# Patient Record
Sex: MALE | Race: WHITE
[De-identification: names, ages, dates, MRNs, and addresses within clinical notes are randomized per-mention and may not be internally consistent; named-entity substitution may affect disease eponyms.]

---

## 2023-10-11 ENCOUNTER — HOSPITAL ENCOUNTER (INPATIENT)
Dept: HOSPITAL 97 - ER | Age: 63
LOS: 2 days | Discharge: HOME | DRG: 854 | End: 2023-10-13
Attending: STUDENT IN AN ORGANIZED HEALTH CARE EDUCATION/TRAINING PROGRAM | Admitting: STUDENT IN AN ORGANIZED HEALTH CARE EDUCATION/TRAINING PROGRAM
Payer: COMMERCIAL

## 2023-10-11 VITALS — BODY MASS INDEX: 40.8 KG/M2

## 2023-10-11 DIAGNOSIS — L02.215: ICD-10-CM

## 2023-10-11 DIAGNOSIS — I15.2: ICD-10-CM

## 2023-10-11 DIAGNOSIS — R65.20: ICD-10-CM

## 2023-10-11 DIAGNOSIS — Z96.652: ICD-10-CM

## 2023-10-11 DIAGNOSIS — E66.9: ICD-10-CM

## 2023-10-11 DIAGNOSIS — E11.65: ICD-10-CM

## 2023-10-11 DIAGNOSIS — Z79.899: ICD-10-CM

## 2023-10-11 DIAGNOSIS — Z79.4: ICD-10-CM

## 2023-10-11 DIAGNOSIS — E78.00: ICD-10-CM

## 2023-10-11 DIAGNOSIS — A41.9: Primary | ICD-10-CM

## 2023-10-11 LAB
ALBUMIN SERPL BCP-MCNC: 2.9 G/DL (ref 3.4–5)
ALP SERPL-CCNC: 88 U/L (ref 45–117)
ALT SERPL W P-5'-P-CCNC: 16 U/L (ref 16–61)
AST SERPL W P-5'-P-CCNC: 5 U/L (ref 15–37)
BUN BLD-MCNC: 14 MG/DL (ref 7–18)
GLUCOSE SERPLBLD-MCNC: 431 MG/DL (ref 74–106)
HCT VFR BLD CALC: 42.5 % (ref 39.6–49)
INR BLD: 1.09
LYMPHOCYTES # SPEC AUTO: 1.2 K/UL (ref 0.7–4.9)
MCV RBC: 91.9 FL (ref 80–100)
PMV BLD: 8.3 FL (ref 7.6–11.3)
POTASSIUM SERPL-SCNC: 4 MEQ/L (ref 3.5–5.1)
RBC # BLD: 4.62 M/UL (ref 4.33–5.43)
WBC # BLD AUTO: 15.8 THOU/UL (ref 4.3–10.9)

## 2023-10-11 PROCEDURE — 0KBM0ZZ EXCISION OF PERINEUM MUSCLE, OPEN APPROACH: ICD-10-PCS

## 2023-10-11 PROCEDURE — 87077 CULTURE AEROBIC IDENTIFY: CPT

## 2023-10-11 PROCEDURE — 87070 CULTURE OTHR SPECIMN AEROBIC: CPT

## 2023-10-11 PROCEDURE — 72193 CT PELVIS W/DYE: CPT

## 2023-10-11 PROCEDURE — 96365 THER/PROPH/DIAG IV INF INIT: CPT

## 2023-10-11 PROCEDURE — 81001 URINALYSIS AUTO W/SCOPE: CPT

## 2023-10-11 PROCEDURE — 36415 COLL VENOUS BLD VENIPUNCTURE: CPT

## 2023-10-11 PROCEDURE — 82947 ASSAY GLUCOSE BLOOD QUANT: CPT

## 2023-10-11 PROCEDURE — 87205 SMEAR GRAM STAIN: CPT

## 2023-10-11 PROCEDURE — 83605 ASSAY OF LACTIC ACID: CPT

## 2023-10-11 PROCEDURE — 83735 ASSAY OF MAGNESIUM: CPT

## 2023-10-11 PROCEDURE — 85025 COMPLETE CBC W/AUTO DIFF WBC: CPT

## 2023-10-11 PROCEDURE — 87075 CULTR BACTERIA EXCEPT BLOOD: CPT

## 2023-10-11 PROCEDURE — 80048 BASIC METABOLIC PNL TOTAL CA: CPT

## 2023-10-11 PROCEDURE — 87040 BLOOD CULTURE FOR BACTERIA: CPT

## 2023-10-11 PROCEDURE — 96375 TX/PRO/DX INJ NEW DRUG ADDON: CPT

## 2023-10-11 PROCEDURE — 80053 COMPREHEN METABOLIC PANEL: CPT

## 2023-10-11 PROCEDURE — 99285 EMERGENCY DEPT VISIT HI MDM: CPT

## 2023-10-11 PROCEDURE — 3E033XZ INTRODUCTION OF VASOPRESSOR INTO PERIPHERAL VEIN, PERCUTANEOUS APPROACH: ICD-10-PCS

## 2023-10-11 PROCEDURE — 96361 HYDRATE IV INFUSION ADD-ON: CPT

## 2023-10-11 PROCEDURE — 87186 SC STD MICRODIL/AGAR DIL: CPT

## 2023-10-11 PROCEDURE — 85610 PROTHROMBIN TIME: CPT

## 2023-10-11 PROCEDURE — 84100 ASSAY OF PHOSPHORUS: CPT

## 2023-10-11 PROCEDURE — 87086 URINE CULTURE/COLONY COUNT: CPT

## 2023-10-11 PROCEDURE — 85730 THROMBOPLASTIN TIME PARTIAL: CPT

## 2023-10-11 PROCEDURE — 87088 URINE BACTERIA CULTURE: CPT

## 2023-10-11 PROCEDURE — 93005 ELECTROCARDIOGRAM TRACING: CPT

## 2023-10-11 RX ADMIN — Medication SCH ML: at 20:03

## 2023-10-11 RX ADMIN — MORPHINE SULFATE PRN MG: 4 INJECTION, SOLUTION INTRAMUSCULAR; INTRAVENOUS at 20:01

## 2023-10-11 RX ADMIN — HYDROCODONE BITARTRATE AND ACETAMINOPHEN PRN TAB: 10; 325 TABLET ORAL at 22:44

## 2023-10-11 RX ADMIN — SODIUM CHLORIDE SCH MLS: 9 INJECTION, SOLUTION INTRAVENOUS at 22:40

## 2023-10-11 RX ADMIN — SODIUM CHLORIDE SCH MLS: 0.9 INJECTION, SOLUTION INTRAVENOUS at 17:28

## 2023-10-11 RX ADMIN — HUMAN INSULIN SCH UNIT: 100 INJECTION, SOLUTION SUBCUTANEOUS at 20:18

## 2023-10-11 NOTE — P.HP
Certification for Inpatient


With expected LOS: <2 Midnights


Patient will require the following post-hospital care: None


Practitioner: I am a practitioner with admitting privileges, knowledge of 

patient current condition, hospital course, and medical plan of care.


Services: Services provided to patient in accordance with Admission requirements

found in Title 42 Section 412.3 of the Code of Federal Regulations





Patient History


Date of Service: 10/11/23


Reason for admission: Severe sepsis, perineal abscess


History of Present Illness: 








Ceasar Larios 63-year-old male with a history of hypertension, diabetes 

mellitus, hypercholesterolemia presented to the ER via ambulance with 

complaining of groin abscess.  Patient reported the symptoms started 10 days 

ago.  Patient reports pain on the rectal area got worse today.  Patient also 

reports pain when urinating.  Patient reports the pain is 10 out of 10.  Denies 

history of previous colorectal surgeries, or hemorrhoids.  Patient denies fever 

chills nausea vomiting.  Patient denies chest pain palpitation chest discomfort.

 Patient reports he has been not checking his blood sugar for few weeks as his 

glucometer is not working.  He seen his diabetic doctor once every 6 months last

A1c was around 6.


ED course; vital signs blood pressure 166/100, pulse 116, respiration 20, 

temperature 98.2, pulse ox 95% on room air.  Lab works is reports significant 

for WBC 15.80,neutrophil 85.3 lymphocytes 7.3 blood sugar 431, lactic acid 2.2, 

albumin 2.9.  Patient is in regular sinus tachycardia QRS Axis is normal, NE 

interval is normal, QRS interval is normal, with nonspecific STT changes.  

Patient was given well 1 L normal saline IV bolus, Zosyn IV piggyback to 3.375 g

IV over 60 minutes, morphine 4 mg IV push over 4 minutes for pain given.  CT 

pelvis without contrast significant for large irregular abscess collection seen 

in the right aspect of the perineum.  There is associated left-sided disc 

scrotal and perineal soft tissue thickening as well.  This appears to 

communicate nterolateraly and general laterally with skin surface but does not 

extend into intrapelvic contents.  Planning to admit the patient with the di

agnosis of severe sepsis without septic shock, and perineal abscess





Allergies





No Known Allergies Allergy (Unverified 10/11/23 14:40)


   








Physical Examination





- Studies


Laboratory Data (last 24 hrs)











  10/11/23 10/11/23 10/11/23





  12:06 12:06 12:06


 


WBC    15.80 H


 


Hgb    14.7


 


Hct    42.5


 


Plt Count    291


 


PT  12.0  


 


INR  1.09  


 


APTT  30.4  


 


Sodium   136 


 


Potassium   4.0 


 


BUN   14 


 


Creatinine   1.20 


 


Glucose   431 H* 


 


Total Bilirubin   0.4 


 


AST   5 L 


 


ALT   16 


 


Alkaline Phosphatase   88 











Assessment and Plan





- Problems (Diagnosis)


(1) Perineal abscess


Current Visit: Yes   Status: Acute   


Plan: 





Acute, patient with complaints of severe rectal area pain for last 10 days pain 

got worse today 


Surgery Dr. Yin consulted and seen


Planning to take the patient to the OR today

















(2) Hypertension


Current Visit: Yes   Status: Chronic   


Plan: 





Chronic, controlled on the current  Home medication current


Resume home medications


Continue to monitor





Qualifiers: 


   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   





(3) Hyperglycemia due to type 2 diabetes mellitus


Current Visit: Yes   Status: Chronic   


Plan: 








Chronic, controlled on insulin


Denies hypoglycemic episodes


Seeing endocrinologist every 3 to 6-month


Blood sugar ranges 100-200 at home had not checked her blood sugar for a few 

weeks due to glucometer not working


Blood sugar check every 6 hours with regular insulin aggressive management as 

the blood sugar level is above 400 today3


Hypoglycemic precautions











(4) Hyperlipidemia associated with type 2 diabetes mellitus


Current Visit: Yes   Status: Chronic   


Plan: 








Chronic, controlled on statin therapy at home


Resume home medication


Low-fat low-cholesterol diet


We will check lipid panel in the morning











(5) Severe sepsis


Current Visit: Yes   Status: Acute   


Discharge Plan: Home


Plan to discharge in: 24 Hours





- Advance Directives


Does patient have a Living Will: No


Does patient have a Durable POA for Healthcare: No





- Code Status/Comfort Care


Code Status: Full Code


Physician Review: Patient Assessed, Agree with Above Assessment and Plan


Critical Care: Yes


Time Spent Managing Pts Care (In Minutes): 55 (Minutes)

## 2023-10-11 NOTE — ER
Nurse's Notes                                                                                     

 [facility]                                                                 

Name: Constantino Mcdonough                                                                               

Age: 63 yrs                                                                                       

Sex: Male                                                                                         

: 1960                                                                                   

MRN: [id number]                                                                                   

Arrival Date: 10/11/2023                                                                          

Time: 11:43                                                                                       

Account#: [id number]                                                                            

Bed 15                                                                                            

Private MD:                                                                                       

Diagnosis: Severe sepsis without septic shock;Perineal Abscess                                    

                                                                                                  

Presentation:                                                                                     

10/11                                                                                             

11:53 Chief complaint: Patient states: Abscess to perineum area for 10 days. States there is  ll1 

      a hard knot there that drains some pus. No known fever. Coronavirus screen: Vaccine         

      status: Patient reports receiving the 2nd dose of the covid vaccine. Client denies          

      travel out of the [place] in the last 14 days. At this time, the client does not indicate      

      any symptoms associated with coronavirus-19. Ebola Screen: Patient denies travel to an      

      Ebola-affected area in the 21 days before illness onset. Initial Sepsis Screen: Does        

      the patient meet any 2 criteria? No. Patient's initial sepsis screen is negative. Does      

      the patient have a suspected source of infection? Yes: Skin breakdown/wound. Risk           

      Assessment: Do you want to hurt yourself or someone else? Patient reports no desire to      

      harm self or others. Onset of symptoms was 2023.                                 

11:53 Method Of Arrival: Ambulatory                                                           ll1 

11:53 Acuity: LUKE 2                                                                           ll1 

                                                                                                  

Triage Assessment:                                                                                

14:17 General: Behavior is calm, cooperative.                                                 db  

                                                                                                  

Historical:                                                                                       

- Allergies:                                                                                      

11:52 No Known Allergies;                                                                     ll1 

- PMHx:                                                                                           

11:52 Hypertensive disorder; Diabetes mellitus; Hypercholesterolemia;                         ll1 

- PSHx:                                                                                           

11:52 hernia repair x 2; knee replacement;                                                    ll1 

                                                                                                  

- Immunization history:: Adult Immunizations up to date.                                          

- Social history:: Smoking status: Patient denies any tobacco usage or history of.                

                                                                                                  

                                                                                                  

Screenin:15 [facility] ED Fall Risk Assessment (Adult) History of falling in the last 3 months,       db  

      including since admission No falls in past 3 months (0 pts) Confusion or Disorientation     

      No (0 pts) Intoxicated or Sedated No (0 pts) Impaired Gait No (0 pts) Mobility Assist       

      Device Used No (0 pt) Altered Elimination No (0 pt) Score/Fall Risk Level 0 - 2 = Low       

      Risk Oriented to surroundings, Maintained a safe environment. Abuse screen: Denies          

      threats or abuse. Denies injuries from another. Nutritional screening: No deficits          

      noted. Tuberculosis screening: No symptoms or risk factors identified.                      

                                                                                                  

Assessment:                                                                                       

12:30 Reassessment: Patient appears in no apparent distress at this time. Patient and/or      db  

      family updated on plan of care and expected duration. Pain level reassessed. Patient is     

      alert, oriented x 3, equal unlabored respirations, skin warm/dry/pink. General: Appears     

      in no apparent distress. comfortable. Pain: Complains of pain in pelvis.                    

13:30 Reassessment: Patient appears in no apparent distress at this time. Patient and/or      db  

      family updated on plan of care and expected duration. Pain level reassessed. Patient is     

      alert, oriented x 3, equal unlabored respirations, skin warm/dry/pink.                      

14:14 Reassessment: Patient appears in no apparent distress at this time. Patient and/or      db  

      family updated on plan of care and expected duration. Pain level reassessed. Patient is     

      alert, oriented x 3, equal unlabored respirations, skin warm/dry/pink. PT TRANSPORTED       

      TO OR VIA STRETCHER WITH OR RN. PT IN NAD. REPORT GIVEN TO OR RN.                           

                                                                                                  

Vital Signs:                                                                                      

11:53  / 100; Pulse 116; Resp 20; Temp 98.2; Pulse Ox 95% ; Weight 129.27 kg; Height 5  ll1 

      ft. 10 in. ; Pain 10/10;                                                                    

12:24  / 92; Pulse 115; Resp 19 S; Temp 98.2; Pulse Ox 93% on R/A; Pain 10/10;          iw  

13:00  / 78; Pulse 102; Resp 16; Pulse Ox 95% ;                                         db  

14:00  / 93; Pulse 101; Resp 16; Pulse Ox 95% on R/A;                                   db  

11:53 Body Mass Index 40.89 (129.27 kg, 177.8 cm)                                             ll1 

11:53 Pain Scale: Adult                                                                       ll1 

12:24 Pain Scale: Adult                                                                       iw  

                                                                                                  

ED Course:                                                                                        

11:44 Patient arrived in ED.                                                                  rg4 

11:45 Ignacio Liang DO is Attending Physician.                                                ms3 

11:46 Arm band placed on Patient placed in an exam room, on a stretcher.                      ll1 

11:48 Zara Singleton, RN is Primary Nurse.                                                  db  

11:55 Triage completed.                                                                       ll1 

12:06 Inserted saline lock: 20 gauge in right hand, using aseptic technique. Blood collected. iw  

12:23 Inserted saline lock: 20 gauge in left antecubital area, using aseptic technique. Blood iw  

      collected.                                                                                  

13:02 CT Pelvis w cont In Process Unspecified.                                                EDMS

13:15 Patient has correct armband on for positive identification. Bed in low position. Call   db  

      light in reach. Side rails up X 1. Client placed on continuous cardiac and pulse            

      oximetry monitoring. NIBP monitoring applied.                                               

13:45 Seth Lee MD is Hospitalizing Provider.                                            ms3 

13:48 Derian Page MD is Hospitalizing Provider.                                           ms3 

14:16 Provided Education on: ADMISSION.                                                       db  

14:16 No provider procedures requiring assistance completed. Patient admitted, IV remains in  db  

      place.                                                                                      

                                                                                                  

Administered Medications:                                                                         

12:14 Drug: NS 0.9% IV 1000 ml IV at 1 bolus Per protocol; 1000 mL bolus Route: IV; Rate: 1   iw  

      bolus; Site: right hand;                                                                    

14:17 Follow up: Response: No adverse reaction; IV Status: Completed infusion; IV Intake:     db  

      1000ml                                                                                      

12:38 Drug: Piperacillin-Tazobactam IVPB 3.375 grams IVPB once over 60 mins; (mix in    iw  

      mL) Route: IVPB; Infused Over: 60 mins; Site: right hand;                                   

13:28 Follow up: Response: No adverse reaction; IV Status: Completed infusion; IV Intake:     db  

      100ml                                                                                       

12:38 Drug: morphine IVP or IV 4 mg IVP once over 4 mins Route: IVP; Infused Over: 4 mins;    iw  

      Site: right hand;                                                                           

14:17 Follow up: Response: No adverse reaction                                                db  

                                                                                                  

                                                                                                  

Medication:                                                                                       

14:16 VIS not applicable for this client.                                                     db  

                                                                                                  

Intake:                                                                                           

13:28 IV: 100ml; Total: 100ml.                                                                db  

14:17 IV: 1000ml; Total: 1100ml.                                                              db  

                                                                                                  

Outcome:                                                                                          

13:47 Decision to Hospitalize by Provider.                                                    ms3 

14:16 Admitted to OR accompanied by nurse, via stretcher, with chart,                         db  

14:16 Condition: stable                                                                           

14:16 Instructed on the need for admit,                                                           

14:17 Patient left the ED.                                                                    db  

                                                                                                  

Signatures:                                                                                       

Dispatcher [employer]                           EDMS                                                 

Janis Mercedes, RN                     Rosmery Jacob                                 rg4                                                  

Sulaiman Romo RN                       RN   ll1                                                  

Ignacio Liang,                         DO   ms3                                                  

Zara Singleton RN                    JOHN   db                                                   

                                                                                                  

**************************************************************************************************

## 2023-10-11 NOTE — XMS REPORT
Continuity of Care Document

                           Created on:2023



Patient:AILEEN GARG

Sex:Male

:1960

External Reference #:[id number]





Demographics







                          Address                   [address] [place] [address]

 

                          Home Phone                [phone]

 

                          Work Phone                [phone]

 

                          Mobile Phone              [phone]

 

                          Email Address             [email or web address]

 

                          Preferred Language        English

 

                          Marital Status            Unknown

 

                          Rastafarian Affiliation     Unknown

 

                          Race                      Unknown

 

                          Additional Race(s)        Unavailable



                                                    White

 

                          Ethnic Group              Unknown









Author







                          Organization              [facility]

t

 

                          Address                   [address] [place] [address] [address] [place] [address]

 

                          Phone                     [phone]









Support







                Name            Relationship    Address         Phone

 

                CHARY JAMESON  Unavailable     [address] [facility] [phone]



                                                [place] [address] 

 

                CHARY JAMESON  Unavailable     [address] [facility] [phone]



                                                [place] [address] 

 

                CHARY GARG  Unavailable     [address] [phone]



                                                [place] [address] 

 

                BUCKSARANYAIT, SANDRA Unavailable     [address] [phone]





                                                [place] [address] 

 

                Buckshalonda, Sandra Other           [address]      [phone]



                                                [place] [address] 

 

                BUCKHEIT, SANDRA Unavailable     [address] [facility] [phone]



                                                [place] [address] 









Care Team Providers







                    Name                Role                Phone

 

                    Danika Duong  Primary Care Physician [phone]

 

                    Son Romero   Attending Clinician Unavailable

 

                    German Hall Attending Clinician Unavailable

 

                    JEFF VELÁSQUEZ   Attending Clinician Unavailable

 

                    Jeff Velásquez MD Attending Clinician [phone]

 

                    ELISEO_Nick_Joseline Attending Clinician Unavailable

 

                    Linwood Bailey MD   Attending Clinician [phone]

 

                    Doctor Unassigned, No Name Attending Clinician Unavailable

 

                    Rosario Bob   Attending Clinician [phone]

 

                    ROSARIO YOUNG       Attending Clinician Unavailable

 

                    LINWOOD BAILEY      Attending Clinician Unavailable

 

                    Rm, Adc Surg Spec Procedure Attending Clinician Unavailable

 

                    Pob, Adc Lab Main   Attending Clinician Unavailable

 

                    Lab, Ang - Db       Attending Clinician Unavailable

 

                    Danika Duong  Attending Clinician [phone]

 

                    DANIKA BUSTOS      Attending Clinician Unavailable

 

                    2, Adc Lab          Attending Clinician Unavailable

 

                    CHANNING JALLOH     Attending Clinician Unavailable

 

                    Son Romero   Admitting Clinician Unavailable

 

                    Physician, No Primary or Family Admitting Clinician Unavaila

jerardo [email or web address]_Rolf_MD Admitting Clinician Unavailable

 

                    LINWOOD BAILEY      Admitting Clinician Unavailable

 

                    UNDEFINED           Admitting Clinician Unavailable









Payers







           Payer Name Policy Type Policy Number Effective Date Expiration Date LIA morton

 

           [facility]            [id number] 2022 00:00:00           

 



           PLANS - OPEN                                             



           ACCESS                                                 







Problems







       Condition Condition Condition Status Onset  Resolution Last   Treating Co

mments 

Source



       Name   Details Category        Date   Date   Treatment Clinician        



                                                 Date                 

 

       Diabetes Diabetes Disease Active                              Unive

rs



       mellitus mellitus               3-15                               ity of



                                   00:00:                             [address]



                                                                      Branch

 

       Benign Benign Disease Active                              Univers



       hypertensi hypertensi               3-15                               it

y of



       on     on                   00::                             [address]



                                                                      Branch

 

       Hyperlipid Hyperlipid Disease Active                              U

nivers



       emia   emia                 3-15                               ity of



                                   00::                             [address]



                                                                      Branch

 

       Obesity Obesity Disease Active                              Univers



       (BMI   (BMI                 9-17                               ity of



       30-39.9) 30-39.9)               00:00:                             [address]



                                                                      Branch

 

       Status Status Disease Active                              Univers



       post total post total               9-17                               it

y of



       left knee left knee               00:00:                             [place]

[place]



       replacemen replacemen               00                                 Me

dical



       t      t                                                       Branch







Allergies, Adverse Reactions, Alerts







       Allergy Allergy Status Severity Reaction(s) Onset  Inactive Treating Comm

ents 

Source



       Name   Type                        Date   Date   Clinician        

 

       No Known DA     Active U                                   [facility]



       Allergie                             4-                        [place]



       s                                  00:00:                      [place]



                                          00                          [facility]

 

       No Known DA     Active U             2019                      [facility]



       Allergie                             1-14                        [facility]



       s                                  00:00:                      [facility]



                                          00                          l of



                                                                      [place]

 

       No Known DA     Active U                                   [facility]



       Allergie                                                     [place]



       s                                  00:00:                      [facility]



                                          00                          dic



                                                                      Hospita



                                                                      l

 

       NO KNOWN Drug   Active                                           [facility]



       ALLERGIE Class                                                   ity of



       S                                                              [facility]







Social History







           Social Habit Start Date Stop Date  Quantity   Comments   Source

 

           Gender identity                                             Universit

y [facility]

 

           Sexual orientation                                             Univer

sity [facility]

 

           Exposure to 2023 Not sure              University of



           SARS-CoV-2 (event) 00:00:00   11:22:00                         [facility]

 

           Alcohol intake 2022 Current               University

 of



                      00:00:00   00:00:00   non-drinker of            [place] [facility]

hilario



                                            alcohol               Branch



                                            (finding)             

 

           History of Social 2021-10-29 2021-10-29                       Univers

ity of



           function   00:00:00   00:00:00                         [facility]

 

           Cigarettes smoked 2011-06-10 2011-06-10                       Univers

ity of



           current (pack per 00:00:00   00:00:00                         [facility]

edical



           day) - Reported                                             Branch

 

           Cigarette  2011-06-10 2011-06-10                       University of



           pack-years 00:00:00   00:00:00                         [facility]

 

           Tobacco use and 2011-06-10 2011-06-10 Former smokeless            Uni

versity of



           exposure   00:00:00   00:00:00   tobacco user            [place] [facility]

l



                                                                  Branch

 

           Tobacco Comment 2011-06-10 2011-06-10 1 pack chewing            Unive

rsity of



                      00:00:00   00:00:00   tobacco/day for            [place] [facility]

ical



                                            34 years              Branch

 

           History of tobacco            1999 Chews Tobacco            Uni

versity of



           use                   00:00:00                         [facility]

 

           Sex Assigned At 1960                       Universit

y of



           Birth      00:00:00   00:00:00                         [facility]









                Smoking Status  Start Date      Stop Date       Source

 

                Ex-smoker       2011-06-10 00:00:00 2011-06-10 00:00:00 Universi

ty of [facility]







Medications







       Ordered Filled Start  Stop   Current Ordering Indication Dosage Frequency

 Signature

                    Comments            Components          Source



     Medication Medication Date Date Medication? Clinician                (SIG) 

          



     Name Name                                                   

 

     insulin      2023      Yes       [id number]           INJECT 12           U

nivers



     aspart      0-06                               UNITS           ity of



     U-100      00:00:                               BEFORE           [place]



     (NOVOLOG      00                                 BREAKFAST,           Medic

al



     FLEXPEN                                         12 UNITS           Branch



     U-100                                         FOR LUNCH           



     INSULIN)                                         AND 14           



     100 unit/mL                                         UNITS FOR           



     (3 mL)                                         DINNER           



     injection                                                        

 

     tirzepatide      2023      Yes       [id number] 7.5mg      inject 7.5      

     Univers



     (MOUNJARO)      0-06                               mg under           ity o

f



     7.5 mg/0.5      00:00:                               the skin           [facility]

as



     mL PnIj      00                                 weekly.           Medical



                                                                 Branch

 

     flash      2023      Yes       [id number] 1{each}      Apply 1           Un

emely



     glucose      0-06                               Each to           ity of



     sensor      00:00:                               skin every           [place]



     (FREESTYLE      00                                 14             Medical



     VIMAL 2                                         (fourteen)           Branch



     SENSOR) Kit                                         days. Dx           



                                                  E11.42           

 

     Insulin      2023      Yes       [id number]           USE AS           Univ

ers



     [place],      0-06                               DIRECTED 4           ity o

f



     Disposable,      00:00:                               TIMES A           [facility]

as



     (BD INSULIN      00                                 DAY            Medical



     PEN NEEDLE                                         (E11.9)           Branch



     UF) 31                                                        



     gauge x                                                        



     5/16" Ndle                                                        

 

     atorvastati      2023      Yes       [id number] 20mg      Take 1           

Univers



     n 20 mg      0-03                               tablet by           ity of



     tablet      00:00:                               mouth at           [place]



               00                                 bedtime.           Medical



                                                                 Branch

 

     gabapentin      2023      Yes       [id number] 300mg      Take 1           

Univers



     300 mg      0-03                               capsule by           ity of



     capsule      00:00:                               mouth in           [place]



               00                                 the            Medical



                                                  morning           Branch



                                                  and 1           



                                                  capsule at           



                                                  noon and 1           



                                                  capsule in           



                                                  the            



                                                  evening.           

 

     insulin      2023      Yes       [id number] 50U       inject 50           U

nivers



     glargine      0-03                               Units           ity of



     U-300 conc      00:00:                               under the           Te

[place]



     (TOUJEO      00                                 skin in           [facility]



     U-300                                         morning.           



     INSULIN)                                                        



     300 unit/mL                                                        



     (1.5 mL)                                                        



     InPn                                                        

 

     atorvastati      2023      Yes       [id number] 20mg      Take 1           

Univers



     n 20 mg      0-03                               tablet by           ity of



     tablet      00:00:                               mouth at           [place]



               00                                 bedtime.           Medical



                                                                 Branch

 

     gabapentin      2023      Yes       [id number] 300mg      Take 1           

Univers



     300 mg      0-03                               capsule by           ity of



     capsule      00:00:                               mouth in           [place]



               00                                 the            Medical



                                                  morning           Branch



                                                  and 1           



                                                  capsule at           



                                                  noon and 1           



                                                  capsule in           



                                                  the            



                                                  evening.           

 

     insulin      2023      Yes       [id number] 50U       inject 50           U

nivers



     glargine      0-03                               Units           ity of



     U-300 conc      00:00:                               under the           Te

xas



     (TOUJEO      00                                 skin in           [facility]



     U-300                                         morning.           



     INSULIN)                                                        



     300 unit/mL                                                        



     (1.5 mL)                                                        



     InPn                                                        

 

     flash            Yes       [id number] 1{each}      APPLY 1           Un

emely



     glucose      9-05                               EACH TO           ity of



     sensor      00:00:                               SKIN EVERY           [place]



     (FREESTYLE      00                                 14             Medical



     VIMAL 2                                         (FOURTEEN)           Branch



     SENSOR) Kit                                         DAYS. DX           



                                                  E11.42           

 

     flash      2023-0      Yes       [id number] 1{each}      APPLY 1           Un

emely



     glucose      9-05                               EACH TO           ity of



     sensor      00:00:                               SKIN EVERY           [place]



     (FREESTYLE      00                                 14             Medical



     VIMAL 2                                         (FOURTEEN)           Branch



     SENSOR) Kit                                         DAYS. DX           



                                                  E11.42           

 

     flash      -0 2023- No        [id number] 1{each}      APPLY 1           U

nivers



     glucose      9-05 10-06                          EACH TO           ity of



     sensor      00:00: 00:00                          SKIN EVERY           [place]

s



     (FREESTYLE      00   :00                           14             Medical



     VIMAL 2                                         (FOURTEEN)           Branch



     SENSOR) Kit                                         DAYS. DX           



                                                  E11.42           

 

     LISINOPRIL      -0      Yes       [id number] 10mg      TAKE 2           Un

emely



     5 mg tablet      8-24                               TABLETS BY           it

y of



               00:00:                               MOUTH           [place]



               00                                 EVERY           Medical



                                                  MORNING           Branch

 

     LISINOPRIL      -0      Yes       [id number] 10mg      TAKE 2           Un

emely



     5 mg tablet      8-24                               TABLETS BY           it

y of



               00:00:                               MOUTH           [place]



               00                                 EVERY           Medical



                                                  MORNING           Branch

 

     LISINOPRIL      -0      Yes       [id number] 10mg      TAKE 2           Un

emely



     5 mg tablet      8-24                               TABLETS BY           it

y of



               00:00:                               MOUTH           [place]



               00                                 EVERY           Medical



                                                  MORNING           Branch

 

     LISINOPRIL      -0      Yes       [id number] 10mg      TAKE 2           Un

emely



     5 mg tablet      8-24                               TABLETS BY           it

y of



               00:00:                               MOUTH           [place]



               00                                 EVERY           Medical



                                                  MORNING           Branch

 

     flash      -0      Yes       [id number] 1{each}      APPLY 1           Un

emely



     glucose      8-05                               EACH TO           ity of



     sensor      00:00:                               SKIN EVERY           [place]



     (FREESTYLE      00                                 14             Medical



     VIMAL 2                                         (FOURTEEN)           Branch



     SENSOR) Kit                                         DAYS. DX           



                                                  E11.42           

 

     flash      -0      Yes       [id number] 1{each}      APPLY 1           Un

emely



     glucose      8-05                               EACH TO           ity of



     sensor      00:00:                               SKIN EVERY           [place]



     (FREESTYLE      00                                 14             Medical



     VIMAL 2                                         (FOURTEEN)           Branch



     SENSOR) Kit                                         DAYS. DX           



                                                  E11.42           

 

     flash      -0 - No        [id number] 1{each}      APPLY 1           U

nivers



     glucose      8-05 09-05                          EACH TO           ity of



     sensor      00:00: 00:00                          SKIN EVERY           [place]

s



     (FREESTYLE      00   :00                           14             Medical



     VIMAL 2                                         (FOURTEEN)           Branch



     SENSOR) Kit                                         DAYS. DX           



                                                  E11.42           

 

     tirzepatide      -0      Yes       [id number] 7.5mg      inject 7.5      

     Univers



     (MOUNJARO)      7-19                               mg under           ity o

f



     7.5 mg/0.5      00:00:                               the skin           [facility]

as



     mL PnIj      00                                 weekly.           Medical



                                                                 Branch

 

     tirzepatide            Yes       [id number] 7.5mg      inject 7.5      

     Univers



     (MOUNJARO)      7-19                               mg under           ity o

f



     7.5 mg/0.5      00:00:                               the skin           [facility]

as



     mL PnIj      00                                 weekly.           Medical



                                                                 Branch

 

     tirzepatide            Yes       [id number] 7.5mg      inject 7.5      

     Univers



     (MOUNJARO)      7-19                               mg under           ity o

f



     7.5 mg/0.5      00:00:                               the skin           [facility]

as



     mL PnIj      00                                 weekly.           Medical



                                                                 Branch

 

     tirzepatide            Yes       [id number] 7.5mg      inject 7.5      

     Univers



     (MOUNJARO)      7-19                               mg under           ity o

f



     7.5 mg/0.5      00:00:                               the skin           [facility]

as



     mL PnIj      00                                 weekly.           Medical



                                                                 Br[facility]

 

     tirzepatide            Yes       [id number] 7.5mg      inject 7.5      

     [facility]



     (MOUNJARO)      7-19                               mg under           ity o

f



     7.5 mg/0.5      00:00:                               the skin           [facility]

as



     mL PnIj      00                                 weekly.           Medical



                                                                 Branch

 

     tirzepatide            Yes       [id number] 7.5mg      inject 7.5      

     [facility]



     (MOUNJARO)      7-19                               mg under           ity o

f



     7.5 mg/0.5      00:00:                               the skin           [facility]

as



     mL PnIj      00                                 weekly.           Medical



                                                                 Branch

 

     tirzepatide      2023- No        [id number] 7.5mg      inject 7.5     

      [facility]



     (MOUNJARO)      7-19 10-06                          mg under           ity 

of



     7.5 mg/0.5      00:00: 00:00                          the skin           [facility]

[place]



     mL PnIj      00   :00                           weekly.           Medical



                                                                 [facility]anch

 

     gabapentin            Yes       [id number] 300mg      Take 1           

[facility]



     300 mg      4-14                               capsule by           ity of



     capsule      00:00:                               mouth in           [place]



               00                                 the            Medical



                                                  morning           [facility]



                                                  and 1           



                                                  capsule at           



                                                  noon and 1           



                                                  capsule in           



                                                  the            



                                                  evening.           

 

     gabapentin            Yes       [id number] 300mg      Take 1           

[facility]



     300 mg      4-14                               capsule by           ity of



     capsule      00:00:                               mouth in           [place]



               00                                 the            Medical



                                                  morning           [facility]



                                                  and 1           



                                                  capsule at           



                                                  noon and 1           



                                                  capsule in           



                                                  the            



                                                  evening.           

 

     gabapentin      2023-0      Yes       [id number] 300mg      Take 1           

[facility]



     300 mg      4-14                               capsule by           ity of



     capsule      00:00:                               mouth in           [address]



                                                  morning           [place]



                                                  and 1           



                                                  capsule at           



                                                  noon and 1           



                                                  capsule in           



                                                  the            



                                                  evening.           

 

     gabapentin      2023-0      Yes       [id number] 300mg      Take 1           

[facility]



     300 mg      4-14                               capsule by           ity of



     capsule      00:00:                               mouth in           [address]



                                                  and 1           



                                                  capsule at           



                                                  noon and 1           



                                                  capsule in           



                                                  the            



                                                  evening.           

 

     gabapentin      2023-0      Yes       [id number] 300mg      Take 1           

[facility]



     300 mg      4-14                               capsule by           ity of



     capsule      00:00:                               mouth in           [address]



                                                  and 1           



                                                  capsule at           



                                                  noon and 1           



                                                  capsule in           



                                                  the            



                                                  evening.           

 

     gabapentin      2023-0      Yes       [id number] 300mg      Take 1           

[facility]



     300 mg      4-14                               capsule by           ity of



     capsule      00:00:                               mouth in           [address]



                                                  and 1           



                                                  capsule at           



                                                  noon and 1           



                                                  capsule in           



                                                  the            



                                                  evening.           

 

     gabapentin      2023-0      Yes       [id number] 300mg      Take 1           

[facility]



     300 mg      4-14                               capsule by           ity of



     capsule      00:00:                               mouth in           [address]



                                                  and 1           



                                                  capsule at           



                                                  noon and 1           



                                                  capsule in           



                                                  the            



                                                  evening.           

 

     gabapentin      2023-0      Yes       [id number] 300mg      Take 1           

[facility]



     300 mg      4-14                               capsule by           ity of



     capsule      00:00:                               mouth in           [address]



                                                  and 1           



                                                  capsule at           



                                                  noon and 1           



                                                  capsule in           



                                                  the            



                                                  evening.           

 

     gabapentin      2023-0      Yes       [id number] 300mg      Take 1           

[facility]



     300 mg      4-14                               capsule by           ity of



     capsule      00:00:                               mouth in           [address]



                                                  and 1           



                                                  capsule at           



                                                  noon and 1           



                                                  capsule in           



                                                  the            



                                                  evening.           

 

     gabapentin      2023-0      Yes       [id number] 300mg      Take 1           

[facility]



     300 mg      4-14                               capsule by           ity of



     capsule      00:00:                               mouth in           [address]



                                                  and 1           



                                                  capsule at           



                                                  noon and 1           



                                                  capsule in           



                                                  the            



                                                  evening.           

 

     gabapentin      2023-0      Yes       [id number] 300mg      Take 1           

[facility]



     300 mg      4-14                               capsule by           ity of



     capsule      00:00:                               mouth in           [address]



                                                  and 1           



                                                  capsule at           



                                                  noon and 1           



                                                  capsule in           



                                                  the            



                                                  evening.           

 

     gabapentin      2023-0      Yes       [id number] 300mg      Take 1           

[facility]



     300 mg      4-14                               capsule by           ity of



     capsule      00:00:                               mouth in           [address]



                                                  and 1           



                                                  capsule at           



                                                  noon and 1           



                                                  capsule in           



                                                  the            



                                                  evening.           

 

     gabapentin      0      Yes       [id number] 300mg      Take 1           

[facility]



     300 mg      4-14                               capsule by           ity of



     capsule      00:00:                               mouth in           [place]



               00                                 the            Medical



                                                  morning           [facility]



                                                  and 1           



                                                  capsule at           



                                                  noon and 1           



                                                  capsule in           



                                                  the            



                                                  evening.           

 

     gabapentin      -0 2023- No        [id number] 300mg      Take 1          

 [facility]



     300 mg      4-14 10-03                          capsule by           ity of



     capsule      00:00: 00:00                          mouth in           [place]



               00   :00                           the            Medical



                                                  morning           [facility]



                                                  and 1           



                                                  capsule at           



                                                  noon and 1           



                                                  capsule in           



                                                  the            



                                                  evening.           

 

     gabapentin      0      Yes       [id number] 300mg      Take 1           

[facility]



     300 mg      2-24                               capsule by           ity of



     capsule      00:00:                               mouth in           [place]



               00                                 the            Medical



                                                  morning           [facility]



                                                  and 1           



                                                  capsule at           



                                                  noon and 1           



                                                  capsule in           



                                                  the            



                                                  evening.           

 

     insulin            Yes       [id number]           INJECT 12           U

n[facility]



     aspart      2-24                               UNITS           ity of



     U-100      00:00:                               BEFORE           [place]



     (NOVOLOG      00                                 BREAKFAST,           Medic

al



     FLEXPEN                                         12 UNITS           [facility]



     U-100                                         FOR LUNCH           



     INSULIN)                                         AND 14           



     100 unit/mL                                         UNITS FOR           



     (3 mL)                                         [facility]           



     injection                                                        

 

     insulin            Yes       [id number] 50U       inject 50           U

nivers



     glargine      2-24                               Units           ity of



     U-300 conc      00:00:                               under the           [facility]

[facility]



     (TOUJEO      00                                 skin in           Medical



     SOLOSTAR                                         the            [facility]



     U-300                                         morning.           



     INSULIN)                                                        



     300 unit/mL                                                        



     (1.5 mL)                                                        



     InPn                                                        

 

     tirzepatide            Yes       [id number] 7.5mg      inject 7.5      

     [facility]



     (MOUNJARO)      2-24                               mg under           ity o

f



     7.5 mg/0.5      00:00:                               the skin           [facility]

as



     mL PnIj      00                                 weekly.           Medical



                                                                 [facility]

 

     atorvastati      0      Yes       [id number] 20mg      Take 1           

[facility]



     n 20 mg      2-24                               tablet by           ity of



     tablet      00:00:                               mouth at           [place]



               00                                 bedtime.           Medical



                                                                 Br[facility]

 

     lisinopriL      0      Yes       [id number] 10mg      Take 2           Un

[facility]



     5 mg tablet      2-24                               tablets by           it

y of



               00:00:                               mouth           [place]



               00                                 every           Medical



                                                  morning.           [facility]

 

     flash      0      Yes       [id number] 1{each}      Apply 1           Un

[facility]



     glucose      2-24                               Each to           ity of



     sensor      00:00:                               skin every           [place]



     (FREESTYLE      00                                 14             Medical



     VIMAL 2                                         (fourteen)           [facility]



     SENSOR) Kit                                         days. Dx           



                                                  E11.42           

 

     gabapentin      -0      Yes       [id number] 300mg      Take 1           

[facility]



     300 mg      2-24                               capsule by           ity of



     capsule      00:00:                               mouth in           [place]



               00                                 the            Medical



                                                  morning           [facility]



                                                  and 1           



                                                  capsule at           



                                                  noon and 1           



                                                  capsule in           



                                                  the            



                                                  evening.           

 

     insulin            Yes       [id number]           INJECT 12           U

nivers



     aspart      2-24                               UNITS           ity of



     U-100      00:00:                               BEFORE           [place]



     (NOVOLOG      00                                 BREAKFAST,           Medic

al



     FLEXPEN                                         12 UNITS           [facility]



     U-100                                         FOR LUNCH           



     INSULIN)                                         AND 14           



     100 unit/mL                                         UNITS FOR           



     (3 mL)                                         [facility]           



     injection                                                        

 

     insulin            Yes       [id number] 50U       inject 50           U

nivers



     glargine      2-24                               Units           ity of



     U-300 conc      00:00:                               under the           [facility]

[facility]



     (TOUJEO      00                                 skin in           [facility]



     SOLOSTAR                                         the            [facility]



     U-300                                         morning.           



     INSULIN)                                                        



     300 unit/mL                                                        



     (1.5 mL)                                                        



     InPn                                                        

 

     tirzepatide            Yes       [id number] 7.5mg      inject 7.5      

     [facility]



     (MOUNJARO)      2-24                               mg under           ity o

f



     7.5 mg/0.5      00:00:                               the skin           [facility]

as



     mL PnIj      00                                 weekly.           Medical



                                                                 [facility]

 

     atorvastati      0      Yes       [id number] 20mg      Take 1           

[facility]



     n 20 mg      2-24                               tablet by           ity of



     tablet      00:00:                               mouth at           [place]



               00                                 bedtime.           Medical



                                                                 Branch

 

     lisinopriL            Yes       [id number] 10mg      Take 2           Un

[facility]ely



     5 mg tablet      2-24                               tablets by           it

y of



               00:00:                               mouth           [place]



               00                                 every           Medical



                                                  morning.           Branch

 

     flash            Yes       [id number] 1{each}      Apply 1           Un

[facility]



     glucose      2-24                               Each to           ity of



     sensor      00:00:                               skin every           [place]



     (FREESTYLE      00                                 14             Medical



     VIMAL 2                                         (fourteen)           Branch



     SENSOR) Kit                                         days. Dx           



                                                  E11.42           

 

     insulin            Yes       [id number]           INJECT 12           U

nivers



     aspart      2-24                               UNITS           ity of



     U-100      00:00:                               BEFORE           [place]



     (NOVOLOG      00                                 BREAKFAST,           Medic

al



     FLEXPEN                                         12 UNITS           [facility]



     U-100                                         FOR LUNCH           



     INSULIN)                                         AND 14           



     100 unit/mL                                         UNITS FOR           



     (3 mL)                                         [facility]           



     injection                                                        

 

     insulin            Yes       [id number] 50U       inject 50           U

nivers



     glargine      2-24                               Units           ity of



     U-300 conc      00:00:                               under the           [facility]

[facility]



     (TOUJEO      00                                 skin in           Medical



     SOLOSTAR                                         the            Branch



     U-300                                         morning.           



     INSULIN)                                                        



     300 unit/mL                                                        



     (1.5 mL)                                                        



     In                                                        

 

     tirzepatide            Yes       [id number] 7.5mg      inject 7.5      

     [facility]



     (MOUNJARO)      2-24                               mg under           ity o

f



     7.5 mg/0.5      00:00:                               the skin           [facility]

as



     mL PnIj      00                                 weekly.           Medical



                                                                 Branch

 

     atorvastati            Yes       [id number] 20mg      Take 1           

[facility]



     n 20 mg      2-24                               tablet by           ity of



     tablet      00:00:                               mouth at           [place]



               00                                 bedtime.           Medical



                                                                 Branch

 

     lisinopriL            Yes       [id number] 10mg      Take 2           Un

emely



     5 mg tablet      2-24                               tablets by           it

y of



               00:00:                               mouth           [place]



               00                                 every           Medical



                                                  morning.           Branch

 

     flash            Yes       [id number] 1{each}      Apply 1           Un

emely



     glucose      2-24                               Each to           ity of



     sensor      00:00:                               skin every           [place]



     (FREESTYLE      00                                 14             Medical



     VIMAL 2                                         (fourteen)           Branch



     SENSOR) Kit                                         days. Dx           



                                                  E11.42           

 

     insulin            Yes       [id number]           INJECT 12           U

nivers



     aspart      2-24                               UNITS           ity of



     U-100      00:00:                               BEFORE           [place]



     (NOVOLOG      00                                 BREAKFAST,           Medic

al



     FLEXPEN                                         12 UNITS           Branch



     U-100                                         FOR LUNCH           



     INSULIN)                                         AND 14           



     100 unit/mL                                         UNITS FOR           



     (3 mL)                                         [facility]IN[facility]           



     injection                                                        

 

     insulin            Yes       [id number] 50U       inject 50           U

nivers



     glargine      2-24                               Units           ity of



     U-300 conc      00:00:                               under the           [facility]

[place]



     (TOUJEO                                       skin in           [facility]



     U-300                                         morning.           



     INSULIN)                                                        



     300 unit/mL                                                        



     (1.5 mL)                                                        



     In                                                        

 

     tirzepatide            Yes       [id number] 7.5mg      inject 7.5      

     [facility]



     (MOUNJARO)      2-24                               mg under           ity o

f



     7.5 mg/0.5      00:00:                               the skin           [facility]

as



     mL PnIj      00                                 weekly.           Medical



                                                                 Branch

 

     atorvastati            Yes       [id number] 20mg      Take 1           

[facility]



     n 20 mg      2-24                               tablet by           ity of



     tablet      00:00:                               mouth at           [place]



               00                                 bedtime.           Medical



                                                                 Branch

 

     lisinopriL            Yes       [id number] 10mg      Take 2           Un

emely



     5 mg tablet      2-24                               tablets by           it

y of



               00:00:                               mouth           [place]



               00                                 every           Medical



                                                  morning.           Branch

 

     flash            Yes       [id number] 1{each}      Apply 1           Un

emely



     glucose      2-24                               Each to           ity of



     sensor      00:00:                               skin every           [place]



     (FREESTYLE      00                                 14             Medical



     VIMAL 2                                         (fourteen)           Branch



     SENSOR) Kit                                         days. Dx           



                                                  E11.42           

 

     insulin            Yes       [id number]           INJECT 12           U

nivers



     aspart      2-24                               UNITS           ity of



     U-100      00:00:                               BEFORE           [place]



     (NOVOLOG      00                                 BREAKFAST,           Medic

al



     FLEXPEN                                         12 UNITS           Branch



     U-100                                         FOR LUNCH           



     INSULIN)                                         AND 14           



     100 unit/mL                                         UNITS FOR           



     (3 mL)                                         DINNER           



     injection                                                        

 

     insulin            Yes       [id number] 50U       inject 50           U

nivers



     glargine      2-24                               Units           ity of



     U-300 conc      00:00:                               under the           [facility]

[place]



     (TOUJEO      00                                 skin in           [facility]



     SOLOSTAR                                         the            Branch



     U-300                                         morning.           



     INSULIN)                                                        



     300 unit/mL                                                        



     (1.5 mL)                                                        



     InPn                                                        

 

     tirzepatide            Yes       [id number] 7.5mg      inject 7.5      

     [facility]



     (MOUNJARO)      2-24                               mg under           ity o

f



     7.5 mg/0.5      00:00:                               the skin           [facility]

as



     mL PnIj      00                                 weekly.           Medical



                                                                 Branch

 

     atorvastati            Yes       [id number] 20mg      Take 1           

[facility]



     n 20 mg      2-24                               tablet by           ity of



     tablet      00:00:                               mouth at           [place]



               00                                 bedtime.           Medical



                                                                 Branch

 

     lisinopriL            Yes       [id number] 10mg      Take 2           Un

emely



     5 mg tablet      2-24                               tablets by           it

y of



               00:00:                               mouth           [place]



               00                                 every           Medical



                                                  morning.           Branch

 

     flash            Yes       [id number] 1{each}      Apply 1           Un

emely



     glucose      2-24                               Each to           ity of



     sensor      00:00:                               skin every           [place]



     (FREESTYLE      00                                 14             Medical



     VIMAL 2                                         (fourteen)           Branch



     SENSOR) Kit                                         days. Dx           



                                                  E11.42           

 

     insulin            Yes       [id number]           INJECT 12           U

nivers



     aspart      2-24                               UNITS           ity of



     U-100      00:00:                               BEFORE           [place]



     (NOVOLOG      00                                 BREAKFAST,           Medic

al



     FLEXPEN                                         12 UNITS           Branch



     U-100                                         FOR LUNCH           



     INSULIN)                                         AND 14           



     100 unit/mL                                         UNITS FOR           



     (3 mL)                                         DINNER           



     injection                                                        

 

     insulin      2023-0      Yes       [id number] 50U       inject 50           U

nivers



     glargine      2-24                               Units           ity of



     U-300 conc      00:00:                               under the           [facility]

[place]



     (TOUJEO      00                                 skin in           [facility]



     U-300                                         morning.           



     INSULIN)                                                        



     300 unit/mL                                                        



     (1.5 mL)                                                        



     InPn                                                        

 

     tirzepatide            Yes       [id number] 7.5mg      inject 7.5      

     [facility]



     (MOUNJARO)      2-24                               mg under           ity o

f



     7.5 mg/0.5      00:00:                               the skin           [facility]

as



     mL PnIj      00                                 weekly.           Medical



                                                                 Branch

 

     atorvastati            Yes       [id number] 20mg      Take 1           

[facility]



     n 20 mg      2-24                               tablet by           ity of



     tablet      00:00:                               mouth at           [place]



               00                                 bedtime.           Medical



                                                                 Branch

 

     lisinopriL            Yes       [id number] 10mg      Take 2           Un

emely



     5 mg tablet      2-24                               tablets by           it

y of



               00:00:                               mouth           [place]



               00                                 every           Medical



                                                  morning.           Branch

 

     flash            Yes       [id number] 1{each}      Apply 1           Un

emely



     glucose      2-24                               Each to           ity of



     sensor      00:00:                               skin every           [place]



     (FREESTYLE      00                                 14             Medical



     VIMAL 2                                         (fourteen)           Branch



     SENSOR) Kit                                         days. Dx           



                                                  E11.42           

 

     insulin            Yes       [id number]           INJECT 12           U

nivers



     aspart      2-24                               UNITS           ity of



     U-100      00:00:                               BEFORE           [place]



     (NOVOLOG      00                                 BREAKFAST,           Medic

al



     FLEXPEN                                         12 UNITS           Branch



     U-100                                         FOR LUNCH           



     INSULIN)                                         AND 14           



     100 unit/mL                                         UNITS FOR           



     (3 mL)                                         DINNER           



     injection                                                        

 

     insulin            Yes       [id number] 50U       inject 50           U

nivers



     glargine      2-24                               Units           ity of



     U-300 conc      00:00:                               under the           [facility]

[place]



     (TOUJE                                 skin in           [facility]



     U-300                                         morning.           



     INSULIN)                                                        



     300 unit/mL                                                        



     (1.5 mL)                                                        



     In                                                        

 

     tirzepatide            Yes       [id number] 7.5mg      inject 7.5      

     [facility]



     (MOUNJARO)      2-24                               mg under           ity o

f



     7.5 mg/0.5      00:00:                               the skin           [facility]

as



     mL PnIj      00                                 weekly.           Medical



                                                                 Branch

 

     atorvastati            Yes       [id number] 20mg      Take 1           

[facility]



     n 20 mg      2-24                               tablet by           ity of



     tablet      00:00:                               mouth at           [place]



               00                                 bedtime.           Medical



                                                                 Branch

 

     lisinopriL            Yes       [id number] 10mg      Take 2           Un

emely



     5 mg tablet      2-24                               tablets by           it

y of



               00:00:                               mouth           [place]



               00                                 every           Medical



                                                  morning.           Branch

 

     flash            Yes       [id number] 1{each}      Apply 1           Un

emely



     glucose      2-24                               Each to           ity of



     sensor      00:00:                               skin every           [place]



     (FREESTYLE      00                                 14             Medical



     VIMAL 2                                         (fourteen)           Branch



     SENSOR) Kit                                         days. Dx           



                                                  E11.42           

 

     insulin            Yes       [id number]           INJECT 12           U

nivers



     aspart      2-24                               UNITS           ity of



     U-100      00:00:                               BEFORE           [place]



     (NOVOLOG      00                                 BREAKFAST,           Medic

al



     FLEXPEN                                         12 UNITS           Branch



     U-100                                         FOR LUNCH           



     INSULIN)                                         AND 14           



     100 unit/mL                                         UNITS FOR           



     (3 mL)                                         DINNER           



     injection                                                        

 

     insulin            Yes       [id number] 50U       inject 50           U

nivers



     glargine      2-24                               Units           ity of



     U-300 conc      00:00:                               under the           [facility]

[place]



     (TOUJEO      00                                 skin in           [facility]



     SOLOSTAR                                         the            Branch



     U-300                                         morning.           



     INSULIN)                                                        



     300 unit/mL                                                        



     (1.5 mL)                                                        



     InPn                                                        

 

     tirzepatide            Yes       [id number] 7.5mg      inject 7.5      

     [facility]



     (MOUNJARO)      2-24                               mg under           ity o

f



     7.5 mg/0.5      00:00:                               the skin           [facility]

as



     mL PnIj      00                                 weekly.           Medical



                                                                 Branch

 

     atorvastati            Yes       [id number] 20mg      Take 1           

[facility]



     n 20 mg      2-24                               tablet by           ity of



     tablet      00:00:                               mouth at           [place]



               00                                 bedtime.           Medical



                                                                 Branch

 

     lisinopriL            Yes       [id number] 10mg      Take 2           Un

emely



     5 mg tablet      2-24                               tablets by           it

y of



               00:00:                               mouth           [place]



               00                                 every           Medical



                                                  morning.           Branch

 

     flash            Yes       [id number] 1{each}      Apply 1           Un

emely



     glucose      2-24                               Each to           ity of



     sensor      00:00:                               skin every           [place]



     (FREESTYLE      00                                 14             Medical



     VIMAL 2                                         (fourteen)           Branch



     SENSOR) Kit                                         days. Dx           



                                                  E11.42           

 

     insulin            Yes       [id number]           INJECT 12           U

nivers



     aspart      2-24                               UNITS           ity of



     U-100      00:00:                               BEFORE           [place]



     (NOVOLOG      00                                 BREAKFAST,           Medic

al



     FLEXPEN                                         12 UNITS           Branch



     U-100                                         FOR LUNCH           



     INSULIN)                                         AND 14           



     100 unit/mL                                         UNITS FOR           



     (3 mL)                                         DINNER           



     injection                                                        

 

     insulin            Yes       [id number] 50U       inject 50           U

nivers



     glargine      2-24                               Units           ity of



     U-300 conc      00:00:                               under the           [facility]

[place]



     (TOUJEO      00                                 skin in           [facility]



     U-300                                         morning.           



     INSULIN)                                                        



     300 unit/mL                                                        



     (1.5 mL)                                                        



     InPn                                                        

 

     tirzepatide            Yes       [id number] 7.5mg      inject 7.5      

     [facility]



     (MOUNJARO)      2-24                               mg under           ity o

f



     7.5 mg/0.5      00:00:                               the skin           [facility]

as



     mL PnIj      00                                 weekly.           Medical



                                                                 Branch

 

     atorvastati            Yes       [id number] 20mg      Take 1           

[facility]



     n 20 mg      2-24                               tablet by           ity of



     tablet      00:00:                               mouth at           [place]



               00                                 bedtime.           Medical



                                                                 Branch

 

     lisinopriL            Yes       [id number] 10mg      Take 2           Un

emely



     5 mg tablet      2-24                               tablets by           it

y of



               00:00:                               mouth           [place]



               00                                 every           Medical



                                                  morning.           Branch

 

     flash            Yes       [id number] 1{each}      Apply 1           Un

emely



     glucose      2-24                               Each to           ity of



     sensor      00:00:                               skin every           [place]



     (FREESTYLE      00                                 14             Medical



     VIMAL 2                                         (fourteen)           Branch



     SENSOR) Kit                                         days. Dx           



                                                  E11.42           

 

     insulin            Yes       [id number]           INJECT 12           U

nivers



     aspart      2-24                               UNITS           ity of



     U-100      00:00:                               BEFORE           [place]



     (NOVOLOG      00                                 BREAKFAST,           Medic

al



     FLEXPEN                                         12 UNITS           Branch



     U-100                                         FOR LUNCH           



     INSULIN)                                         AND 14           



     100 unit/mL                                         UNITS FOR           



     (3 mL)                                         DINNER           



     injection                                                        

 

     insulin            Yes       [id number] 50U       inject 50           U

nivers



     glargine      2-24                               Units           ity of



     U-300 conc      00:00:                               under the           [facility]

[place]



     (TOUJEO                                       skin in           Medical



     SOLOSTAR                                         the            Branch



     U-300                                         morning.           



     INSULIN)                                                        



     300 unit/mL                                                        



     (1.5 mL)                                                        



     InPn                                                        

 

     tirzepatide            Yes       [id number] 7.5mg      inject 7.5      

     [facility]



     (MOUNJARO)      2-24                               mg under           ity o

f



     7.5 mg/0.5      00:00:                               the skin           [facility]

as



     mL PnIj      00                                 weekly.           Medical



                                                                 Branch

 

     atorvastati      2023-0      Yes       [id number] 20mg      Take 1           

[facility]



     n 20 mg      2-24                               tablet by           ity of



     tablet      00:00:                               mouth at           [place]



               00                                 bedtime.           Medical



                                                                 Branch

 

     lisinopriL      0      Yes       [id number] 10mg      Take 2           Un

emely



     5 mg tablet      2-24                               tablets by           it

y of



               00:00:                               mouth           [place]



               00                                 every           Medical



                                                  morning.           Branch

 

     flash      0      Yes       [id number] 1{each}      Apply 1           Un

emely



     glucose      2-24                               Each to           ity of



     sensor      00:00:                               skin every           [place]



     (FREESTYLE      00                                 14             Medical



     VIMAL 2                                         (fourteen)           Branch



     SENSOR) Kit                                         days. Dx           



                                                  E11.42           

 

     insulin            Yes       [id number]           INJECT 12           U

nivers



     aspart      2-24                               UNITS           ity of



     U-100      00:00:                               BEFORE           [place]



     (NOVOLOG      00                                 BREAKFAST,           Medic

al



     FLEXPEN                                         12 UNITS           Branch



     U-100                                         FOR LUNCH           



     INSULIN)                                         AND 14           



     100 unit/mL                                         UNITS FOR           



     (3 mL)                                         DINNER           



     injection                                                        

 

     insulin            Yes       [id number] 50U       inject 50           U

nivers



     glargine      2-24                               Units           ity of



     U-300 conc      00:00:                               under the           [facility]

[place]



     (TOUJEO      00                                 skin in           Medical



     SOLOSTAR                                         the            Branch



     U-300                                         morning.           



     INSULIN)                                                        



     300 unit/mL                                                        



     (1.5 mL)                                                        



     InPn                                                        

 

     atorvastati            Yes       [id number] 20mg      Take 1           

[facility]



     n 20 mg      2-24                               tablet by           ity of



     tablet      00:00:                               mouth at           [place]



               00                                 bedtime.           Medical



                                                                 Branch

 

     lisinopriL            Yes       [id number] 10mg      Take 2           Un

[facility]



     5 mg tablet      2-24                               tablets by           it

y of



               00:00:                               mouth           [place]



               00                                 every           Medical



                                                  morning.           Branch

 

     flash      0      Yes       [id number] 1{each}      Apply 1           Un

[facility]



     glucose      2-24                               Each to           ity of



     sensor      00:00:                               skin every           [place]



     (FREESTYLE      00                                 14             Medical



     VIMAL 2                                         (fourteen)           Branch



     SENSOR) Kit                                         days. Dx           



                                                  E11.42           

 

     insulin            Yes       [id number]           INJECT 12           U

nivers



     aspart      2-24                               UNITS           ity of



     U-100      00:00:                               BEFORE           [place]



     (NOVOLOG      00                                 BREAKFAST,           Medic

al



     FLEXPEN                                         12 UNITS           Branch



     U-100                                         FOR LUNCH           



     INSULIN)                                         AND 14           



     100 unit/mL                                         UNITS FOR           



     (3 mL)                                         DINNER           



     injection                                                        

 

     insulin            Yes       [id number] 50U       inject 50           U

nivers



     glargine      2-24                               Units           ity of



     U-300 conc      00:00:                               under the           [place]



     (TOUJEO      00                                 skin in           [facility]



     U-300                                         morning.           



     INSULIN)                                                        



     300 unit/mL                                                        



     (1.5 mL)                                                        



     InPn                                                        

 

     atorvastati            Yes       [id number] 20mg      Take 1           

[facility]



     n 20 mg      2-24                               tablet by           ity of



     tablet      00:00:                               mouth at           [place]



               00                                 bedtime.           Medical



                                                                 Branch

 

     lisinopriL            Yes       [id number] 10mg      Take 2           Un

[facility]



     5 mg tablet      2-24                               tablets by           it

y of



               00:00:                               mouth           [place]



               00                                 every           Medical



                                                  morning.           Branch

 

     insulin            Yes       [id number]           INJECT 12           U

nivers



     aspart      2-24                               UNITS           ity of



     U-100      00:00:                               BEFORE           [place]



     (NOVOLOG      00                                 BREAKFAST,           Medic

al



     FLEXPEN                                         12 UNITS           Branch



     U-100                                         FOR LUNCH           



     INSULIN)                                         AND 14           



     100 unit/mL                                         UNITS FOR           



     (3 mL)                                         DINNER           



     injection                                                        

 

     insulin            Yes       [id number] 50U       inject 50           U

nivers



     glargine      2-24                               Units           ity of



     U-300 conc      00:00:                               under the           [place]

[place]



     (TOUJEO      00                                 skin in           [facility]



     U-300                                         morning.           



     INSULIN)                                                        



     300 unit/mL                                                        



     (1.5 mL)                                                        



     InPn                                                        

 

     atorvastati            Yes       [id number] 20mg      Take 1           

[facility]



     n 20 mg      2-24                               tablet by           ity of



     tablet      00:00:                               mouth at           [place]



               00                                 bedtime.           Medical



                                                                 Branch

 

     insulin      -      Yes       [id number]           INJECT 12           U

nivers



     aspart      2-24                               UNITS           ity of



     U-100      00:00:                               BEFORE           [place]



     (NOVOLOG      00                                 BREAKFAST,           Medic

al



     FLEXPEN                                         12 UNITS           Branch



     U-100                                         FOR LUNCH           



     INSULIN)                                         AND 14           



     100 unit/mL                                         UNITS FOR           



     (3 mL)                                         DINNER           



     injection                                                        

 

     insulin            Yes       [id number] 50U       inject 50           U

nivers



     glargine      2-24                               Units           ity of



     U-300 conc      00:00:                               under the           [place]

[place]



     (TOUJEO      00                                 skin in           [facility]



     U-300                                         morning.           



     INSULIN)                                                        



     300 unit/mL                                                        



     (1.5 mL)                                                        



     InPn                                                        

 

     atorvastati            Yes       [id number] 20mg      Take 1           

[facility]



     n 20 mg      2-24                               tablet by           ity of



     tablet      00:00:                               mouth at           [place]



               00                                 bedtime.           Medical



                                                                 Branch

 

     insulin            Yes       [id number]           INJECT 12           U

nivers



     aspart      2-24                               UNITS           ity of



     U-100      00:00:                               BEFORE           [place]



     (NOVOLOG      00                                 BREAKFAST,           Medic

al



     FLEXPEN                                         12 UNITS           Branch



     U-100                                         FOR LUNCH           



     INSULIN)                                         AND 14           



     100 unit/mL                                         UNITS FOR           



     (3 mL)                                         DINNER           



     injection                                                        

 

     insulin            Yes       [id number] 50U       inject 50           U

nivers



     glargine      2-24                               Units           ity of



     U-300 conc      00:00:                               under the           [facility]

[place]



     (TOUJEO      00                                 skin in           [facility]



     U-300                                         morning.           



     INSULIN)                                                        



     300 unit/mL                                                        



     (1.5 mL)                                                        



     InPn                                                        

 

     [facility]            Yes       [id number] 20mg      Take 1           

[facility]



     n 20 mg      2-24                               tablet by           ity of



     tablet      00:00:                               mouth at           [place]



               00                                 bedtime.           Medical



                                                                 Branch

 

     insulin            Yes       [id number]           INJECT 12           U

nivers



     aspart      2-24                               UNITS           ity of



     U-100      00:00:                               BEFORE           [place]



     (NOVOLOG      00                                 BREAKFAST,           Medic

al



     FLEXPEN                                         12 UNITS           Branch



     U-100                                         FOR LUNCH           



     INSULIN)                                         AND 14           



     100 unit/mL                                         UNITS FOR           



     (3 mL)                                         DINNER           



     injection                                                        

 

     insulin            Yes       [id number] 50U       inject 50           U

nivers



     glargine      2-24                               Units           ity of



     U-300 conc      00:00:                               under the           [facility]

[place]



     (TOUJEO      00                                 skin in           [facility]



     U-300                                         morning.           



     INSULIN)                                                        



     300 unit/mL                                                        



     (1.5 mL)                                                        



     [facility]       [id number] 20mg      Take 1           

[facility]



     n 20 mg      2-24                               tablet by           ity of



     tablet      00:00:                               mouth at           [place]



               00                                 bedtime.           Medical



                                                                 Branch

 

     insulin            Yes       [id number]           INJECT 12           U

nivers



     aspart      2-24                               UNITS           ity of



     U-100      00:00:                               BEFORE           [place]



     (NOVOLOG      00                                 BREAKFAST,           Medic

al



     FLEXPEN                                         12 UNITS           Branch



     U-100                                         FOR LUNCH           



     INSULIN)                                         AND 14           



     100 unit/mL                                         UNITS FOR           



     (3 mL)                                         DINNER           



     injection                                                        

 

     insulin      2023- No        [id number]           INJECT 12           

[facility]



     aspart      2-24 10-06                          UNITS           ity of



     U-100      00:00: 00:00                          BEFORE           [place]



     (NOVOLOG      00   :00                           BREAKFAST,           Medic

al



     FLEXPEN                                         12 UNITS           Branch



     U-100                                         FOR LUNCH           



     INSULIN)                                         AND 14           



     100 unit/mL                                         UNITS FOR           



     (3 mL)                                         DINNER           



     injection                                                        

 

     insulin      2023- No        [id number] 50U       inject 50           

[facility]



     glargine      2-24 10-03                          Units           ity of



     U-300 conc      00:00: 00:00                          under the           [place]

[place]



     (TOUJEO      00   :00                           skin in           Medical



     SOLOSTAR                                         the            Branch



     U-300                                         morning.           



     INSULIN)                                                        



     300 unit/mL                                                        



     (1.5 mL)                                                        



     InPn                                                        

 

     atorvastati      2023- No        [id number] 20mg      Take 1          

 [facility]



     n 20 mg      2-24 10-03                          tablet by           ity of



     tablet      00:00: 00:00                          mouth at           [place]



               00   :00                           bedtime.           Medical



                                                                 Branch

 

     lisinopriL      2023- No        [id number] 10mg      Take 2           U

nivers



     5 mg tablet      2-24 -24                          tablets by           i

ty of



               00:00: 00:00                          mouth           [place]



               00   :00                           every           Medical



                                                  morning.           Branch

 

     lisinopriL      2023- No        [id number] 10mg      Take 2           U

nivers



     5 mg tablet      2-24 08-24                          tablets by           i

ty of



               00:00: 00:00                          mouth           [place]



               00   :00                           every           Medical



                                                  morning.           Branch

 

     flash      2023- No        [id number] 1{each}      Apply 1           U

nivers



     glucose      2-24 08-05                          Each to           ity of



     sensor      00:00: 00:00                          skin every           [place]

[place]



     (FREESTYLE      00   :00                           14             Medical



     VIMAL 2                                         (fourteen)           Branch



     SENSOR) Kit                                         days. Dx           



                                                  E11.42           

 

     flash      2023- No        [id number] 1{each}      Apply 1           U

nivers



     glucose      2-24 08-05                          Each to           ity of



     sensor      00:00: 00:00                          skin every           [place]

[place]



     (FREESTYLE      00   :00                           14             Medical



     VIMAL 2                                         (fourteen)           Branch



     SENSOR) Kit                                         days. Dx           



                                                  E11.42           

 

     tirzepatide      2023- No        [id number] 7.5mg      inject 7.5     

      [facility]



     (MOUNJARO)      2-24 07-19                          mg under           ity 

of



     7.5 mg/0.5      00:00: 00:00                          the skin           [facility]

[place]



     mL PnIj      00   :00                           weekly.           Medical



                                                                 Branch

 

     gabapentin      2023- No        [id number] 300mg      Take 1          

 [facility]



     300 mg      2-24 -14                          capsule by           ity of



     capsule      00:00: 00:00                          mouth in           [place]



               00   :00                           the            Medical



                                                  morning           Branch



                                                  and 1           



                                                  capsule at           



                                                  noon and 1           



                                                  capsule in           



                                                  the            



                                                  evening.           

 

     tirzepatide      2023- No        [id number] 7.5mg      inject 7.5     

      [facility]



     (MOUNJARO)      2-24 02-24                          mg under           ity 

of



     7.5 mg/0.5      00:00: 00:00                          the skin           [facility]

[place]



     mL PnIj      00   :00                           weekly.           Medical



                                                                 Branch

 

     tirzepatide      2023- No        [id number] 7.5mg      inject 7.5     

      [facility]



     (MOUNJARO)      2-24 02-24                          mg under           ity 

of



     7.5 mg/0.5      00:00: 00:00                          the skin           [facility]

[place]



     mL PnIj      00   :00                           weekly.           Medical



                                                                 Branch

 

     atorvastati            Yes       [id number]           TAKE 1           

[facility]



     n 20 mg      1-30                               TABLET BY           ity of



     tablet      00:00:                               MOUTH           [place]



               00                                 EVERYDAY           Medical



                                                  AT BEDTIME           Branch

 

     lisinopriL            Yes       [id number]           TAKE 2           Un

emely



     5 mg tablet      1-30                               TABLETS BY           it

y of



               00:00:                               MOUTH           [place]



               00                                 EVERY           Medical



                                                  MORNING           Branch

 

     atorvastati      2023- No        [id number]           TAKE 1          

 [facility]



     n 20 mg      1-30 02-24                          TABLET BY           ity of



     tablet      00:00: 00:00                          MOUTH           [place]



               00   :00                           EVERYDAY           Medical



                                                  AT BEDTIME           Branch

 

     lisinopriL      2023- No        [id number]           TAKE 2           U

nivers



     5 mg tablet      1-30 02-24                          TABLETS BY           i

ty of



               00:00: 00:00                          MOUTH           [place]



               00   :00                           EVERY           Medical



                                                  MORNING           Branch

 

     atorvastati      2023- No        [id number]           TAKE 1          

 [facility]



     n 20 mg      1-30 02-24                          TABLET BY           ity of



     tablet      00:00: 00:00                          MOUTH           [place]



               00   :00                           EVERYDAY           Medical



                                                  AT BEDTIME           Branch

 

     lisinopriL      -0 - No        [id number]           TAKE 2           U

nivers



     5 mg tablet      1-30 02-24                          TABLETS BY           i

ty of



               00:00: 00:00                          MOUTH           [place]



               00   :00                           EVERY           Medical



                                                  MORNING           Branch

 

     dulaglutide      2022      Yes       [id number]           Inject 1.5       

    [facility]



     (TRULICITY)      1-14                               mg weekly           ity

 of



     1.5 mg/0.5      00:00:                                              [place]



     mL PnIj      00                                                Medical



                                                                 Branch

 

     dulaglutide      2022      Yes       [id number]           Inject 1.5       

    [facility]



     (TRULICITY)      1-14                               mg weekly           ity

 of



     1.5 mg/0.5      00:00:                                              [place]



     mL PnIj      00                                                Medical



                                                                 Branch

 

     dulaglutide      2022      Yes       [id number]           Inject 1.5       

    [facility]



     (TRULICITY)      1-14                               mg weekly           ity

 of



     1.5 mg/0.5      00:00:                                              [place]



     mL PnIj      00                                                Medical



                                                                 Branch

 

     dulaglutide      2022      Yes       [id number]           Inject 1.5       

    [facility]



     (TRULICITY)      1-14                               mg weekly           ity

 of



     1.5 mg/0.5      00:00:                                              [place]



     mL PnIj      00                                                Medical



                                                                 Branch

 

     dulaglutide      2022      Yes       [id number]           Inject 1.5       

    [facility]



     (TRULICITY)      1-14                               mg weekly           ity

 of



     1.5 mg/0.5      00:00:                                              [place]



     mL PnIj      00                                                Medical



                                                                 Branch

 

     dulaglutide      2022- No        [id number]           Inject 1.5      

     [facility]



     (TRULICITY)      1-14 02-24                          mg weekly           it

y of



     1.5 mg/0.5      00:00: 00:00                                         [place]



     mL PnIj      00   :00                                          Medical



                                                                 Branch

 

     dulaglutide      2022- No        [id number]           Inject 1.5      

     [facility]



     (TRULICITY)      1-14 02-24                          mg weekly           it

y of



     1.5 mg/0.5      00:00: 00:00                                         [place]



     mL PnIj      00   :00                                          Medical



                                                                 Branch

 

     dulaglutide      2022- No        [id number]           Inject 1.5      

     [facility]



     (TRULICITY)      1-14 11-14                          mg weekly           it

y of



     1.5 mg/0.5      00:00: 00:00                                         [place]



     mL PnIj      00   :00                                          Medical



                                                                 Branch

 

     dulaglutide      2022- No        [id number]           Inject 1.5      

     [facility]



     (TRULICITY)      1-14 11-14                          mg weekly           it

y of



     1.5 mg/0.5      00:00: 00:00                                         [place]



     mL PnIj      00   :00                                          Medical



                                                                 Branch

 

     dulaglutide      2022      Yes       [id number]           Inject 1.5       

    [facility]



     (TRULICITY)      1-01                               mg weekly           ity

 of



     1.5 mg/0.5      00:00:                                              [place]



     mL PnIj      00                                                Medical



                                                                 Branch

 

     dulaglutide      2022      Yes       [id number]           Inject 1.5       

    [facility]



     (TRULICITY)      1-01                               mg weekly           ity

 of



     1.5 mg/0.5      00:00:                                              [place]



     mL PnIj      00                                                Medical



                                                                 Branch

 

     dulaglutide      2022      Yes       [id number]           Inject 1.5       

    [facility]



     (TRULICITY)      1-01                               mg weekly           ity

 of



     1.5 mg/0.5      00:00:                                              [place]



     mL PnIj      00                                                Medical



                                                                 Branch

 

     dulaglutide      2022- No        [id number]           Inject 1.5      

     [facility]



     (TRULICITY)       11-14                          mg weekly           it

y of



     1.5 mg/0.5      00:00: 00:00                                         [place]



     mL PnIj      00   :00                                          Medical



                                                                 Branch

 

     dulaglutide      2022- No        [id number]           Inject 1.5      

     [facility]



     (TRULICITY)       11-14                          mg weekly           it

y of



     1.5 mg/0.5      00:00: 00:00                                         [place]



     mL PnIj      00   :                                          Medical



                                                                 Branch

 

     dulaglutide            Yes       [id number]           Inject 1.5       

    [facility]



     (TRULICITY)      8-19                               mg weekly           ity

 of



     1.5 mg/0.5      00:00:                                              [place]



     mL PnIj      00                                                Medical



                                                                 Branch

 

     insulin            Yes       [id number]           INJECT 12           U

nivers



     aspart      8-19                               UNITS           ity of



     U-100      00:00:                               BEFORE           [place]



     (NOVOLOG      00                                 BREAKFAST,           Medic

al



     FLEXPEN                                         12 UNITS           Branch



     U-100                                         FOR LUNCH           



     INSULIN)                                         AND 12           



     100 unit/mL                                         UNITS FOR           



     (3 mL)                                         DINNER           



     injection                                                        

 

     Insulin            Yes       [id number]           USE AS           Univ

ers



     [place],      8-19                               DIRECTED 4           ity o

f



     Disposable,      00:00:                               TIMES A           [facility]

as



     (BD INSULIN      00                                 DAY            Medical



     PEN NEEDLE                                         (E11.9)           Branch



     UF) 31                                                        



     gauge x                                                        



     5/16" Ndle                                                        

 

     lisinopriL            Yes       [id number] 10mg      Take 2           Un

[facility]ely



     5 mg tablet      8-19                               tablets by           it

y of



               00:00:                               mouth in           [place]



               00                                 the            Medical



                                                  morning.           Branch

 

     atorvastati            Yes       [id number] 20mg      Take 1           

[facility]



     n 20 mg      8-19                               tablet by           ity of



     tablet      00:00:                               mouth at           [place]



               00                                 bedtime.           Medical



                                                                 Branch

 

     flash            Yes       [id number] 1{each}      Apply 1           Un

[facility]



     glucose      8-19                               Each to           ity of



     sensor      00:00:                               skin every           [place]



     (FREESTYLE      00                                 14             Medical



     VIMAL 2                                         (fourteen)           Branch



     SENSOR) Kit                                         days. Dx           



                                                  E11.42           

 

     insulin            Yes       [id number] 46U       inject 46           U

nivers



     glargine      8-19                               Units           ity of



     U-300 conc      00:00:                               under the           [facility]

[facility]



     (TOUJEO      00                                 skin           Medical



     SOLOSTAR                                         daily.           Branch



     U-300                                                        



     INSULIN)                                                        



     300 unit/mL                                                        



     (1.5 mL)                                                        



     InPn                                                        

 

     pen needle,            Yes       [id number] 1{each}      inject 1      

     [facility]



     diabetic      8-19                               Each under           ity o

f



     (NOVOFINE      00:00:                               the skin 4           [facility]

[facility]



     PLUS) 32      00                                 (four)           Medical



     gauge x                                         times           Branch



     1/6" Ndle                                         daily.           

 

     gabapentin            Yes       [id number] 300mg      Take 1           

[facility]



     300 mg      8-19                               capsule by           ity of



     capsule      00:00:                               mouth in           [place]



               00                                 the            Medical



                                                  morning           Branch



                                                  and 1           



                                                  capsule in           



                                                  the            



                                                  evening.           

 

     dulaglutide            Yes       [id number]           Inject 1.5       

    [facility]



     (TRULICITY)      8-19                               mg weekly           ity

 of



     1.5 mg/0.5      00:00:                                              [place]



     mL PnIj      00                                                Medical



                                                                 Branch

 

     insulin            Yes       [id number]           INJECT 12           U

nivers



     aspart      8-19                               UNITS           ity of



     U-100      00:00:                               BEFORE           [place]



     (NOVOLOG      00                                 BREAKFAST,           Medic

al



     FLEXPEN                                         12 UNITS           Branch



     U-100                                         FOR LUNCH           



     INSULIN)                                         AND 12           



     100 unit/mL                                         UNITS FOR           



     (3 mL)                                         DINNER           



     injection                                                        

 

     Insulin            Yes       [id number]           USE AS           Univ

ers



     [place],      8-19                               DIRECTED 4           ity o

f



     Disposable,      00:00:                               TIMES A           [facility]

as



     (BD INSULIN      00                                 DAY            Medical



     PEN NEEDLE                                         (E11.9)           Branch



     UF) 31                                                        



     gauge x                                                        



     5/16" Ndle                                                        

 

     lisinopriL            Yes       [id number] 10mg      Take 2           Un

emely



     5 mg tablet      8-19                               tablets by           it

y of



               00:00:                               mouth in           [place]



               00                                 the            Medical



                                                  morning.           Branch

 

     atorvastati            Yes       [id number] 20mg      Take 1           

[facility]



     n 20 mg      8-19                               tablet by           ity of



     tablet      00:00:                               mouth at           [place]



               00                                 bedtime.           Medical



                                                                 Branch

 

     flash            Yes       [id number] 1{each}      Apply 1           Un

em[facility]



     glucose      8-19                               Each to           ity of



     sensor      00:00:                               skin every           [place]



     (FREESTYLE      00                                 14             Medical



     VIMAL 2                                         (fourteen)           Branch



     SENSOR) Kit                                         days. Dx           



                                                  E11.42           

 

     insulin            Yes       [id number] 46U       inject 46           U

nivers



     glargine      8-19                               Units           ity of



     U-300 conc      00:00:                               under the           [facility]

[facility]



     (TOUJEO      00                                 skin           Medical



     SOLOSTAR                                         daily.           Branch



     U-300                                                        



     INSULIN)                                                        



     300 unit/mL                                                        



     (1.5 mL)                                                        



     InPn                                                        

 

     pen needle,            Yes       [id number] 1{each}      inject 1      

     [facility]



     diabetic      8-19                               Each under           ity o

f



     (NOVOFINE      00:00:                               the skin 4           [facility]

[facility]



     PLUS) 32      00                                 (four)           Medical



     gauge x                                         times           Branch



     /6" Ndle                                         daily.           

 

     gabapentin            Yes       [id number] 300mg      Take 1           

[facility]



     300 mg      8-19                               capsule by           ity of



     capsule      00:00:                               mouth in           [place]



               00                                 the            Medical



                                                  morning           Branch



                                                  and 1           



                                                  capsule in           



                                                  the            



                                                  evening.           

 

     dulaglutide            Yes       [id number]           Inject 1.5       

    [facility]



     (TRULICITY)      8-19                               mg weekly           ity

 of



     1.5 mg/0.5      00:00:                                              [place]



     mL PnIj      00                                                Medical



                                                                 Branch

 

     insulin            Yes       [id number]           INJECT 12           U

nivers



     aspart      8-19                               UNITS           ity of



     U-100      00:00:                               BEFORE           [place]



     (NOVOLOG      00                                 BREAKFAST,           Medic

al



     FLEXPEN                                         12 UNITS           Branch



     U-100                                         FOR LUNCH           



     INSULIN)                                         AND 12           



     100 unit/mL                                         UNITS FOR           



     (3 mL)                                         DINNER           



     injection                                                        

 

     Insulin            Yes       [id number]           USE AS           Univ

ers



     [place],      8-19                               DIRECTED 4           ity o

f



     Disposable,      00:00:                               TIMES A           [facility]

[facility]



     (BD INSULIN      00                                 DAY            Medical



     PEN NEEDLE                                         (E11.9)           Branch



     UF) 31                                                        



     gauge x                                                        



     5/16" Ndle                                                        

 

     lisinopriL            Yes       [id number] 10mg      Take 2           Un

[facility]



     5 mg tablet      8-19                               tablets by           it

y of



               00:00:                               mouth in           [place]



               00                                 the            Medical



                                                  morning.           Branch

 

     atorvastati            Yes       [id number] 20mg      Take 1           

[facility]



     n 20 mg      8-19                               tablet by           ity of



     tablet      00:00:                               mouth at           [place]



               00                                 bedtime.           Medical



                                                                 Branch

 

     flash            Yes       [id number] 1{each}      Apply 1           Un

[facility]



     glucose      8-19                               Each to           ity of



     sensor      00:00:                               skin every           [place]



     (FREESTYLE      00                                 14             Medical



     VIMAL 2                                         (fourteen)           Branch



     SENSOR) Kit                                         days. Dx           



                                                  E11.42           

 

     insulin            Yes       [id number] 46U       inject 46           U

nivers



     glargine      8-19                               Units           ity of



     U-300 conc      00:00:                               under the           [facility]

[facility]



     (TOUJEO      00                                 skin           Medical



     SOLOSTAR                                         daily.           Branch



     U-300                                                        



     INSULIN)                                                        



     300 unit/mL                                                        



     (1.5 mL)                                                        



     InPn                                                        

 

     pen needle,            Yes       [id number] 1{each}      inject 1      

     [facility]



     diabetic      8-19                               Each under           ity o

f



     (NOVOFINE      00:00:                               the skin 4           [facility]

[facility]



     PLUS) 32      00                                 (four)           Medical



     gauge x                                         times           Branch



     1/6" Ndle                                         daily.           

 

     gabapentin            Yes       [id number] 300mg      Take 1           

[facility]



     300 mg      8-19                               capsule by           ity of



     capsule      00:00:                               mouth in           [place]



               00                                 the            Medical



                                                  morning           Branch



                                                  and 1           



                                                  capsule in           



                                                  the            



                                                  evening.           

 

     dulaglutide            Yes       [id number]           Inject 1.5       

    [facility]



     (TRULICITY)      8-19                               mg weekly           ity

 of



     1.5 mg/0.5      00:00:                                              [place]



     mL PnIj      00                                                Medical



                                                                 Branch

 

     insulin            Yes       [id number]           INJECT 12           U

nivers



     aspart      8-19                               UNITS           ity of



     U-100      00:00:                               BEFORE           [place]



     (NOVOLOG      00                                 BREAKFAST,           Medic

al



     FLEXPEN                                         12 UNITS           Branch



     U-100                                         FOR LUNCH           



     INSULIN)                                         AND 12           



     100 unit/mL                                         UNITS FOR           



     (3 mL)                                         DINNER           



     injection                                                        

 

     Insulin            Yes       [id number]           USE AS           Univ

ers



     [place],      8-19                               DIRECTED 4           ity o

f



     Disposable,      00:00:                               TIMES A           [facility]

[facility]



     (BD INSULIN      00                                 DAY            Medical



     PEN NEEDLE                                         (E11.9)           Branch



     UF) 31                                                        



     gauge x                                                        



     5/16" Ndle                                                        

 

     lisinopriL            Yes       [id number] 10mg      Take 2           Un

em[facility]



     5 mg tablet      8-19                               tablets by           it

y of



               00:00:                               mouth in           [place]



               00                                 the            Medical



                                                  morning.           Branch

 

     atorvastati            Yes       [id number] 20mg      Take 1           

[facility]



     n 20 mg      8-19                               tablet by           ity of



     tablet      00:00:                               mouth at           [place]



               00                                 bedtime.           Medical



                                                                 Branch

 

     flash            Yes       [id number] 1{each}      Apply 1           Un

[facility]



     glucose      8-19                               Each to           ity of



     sensor      00:00:                               skin every           [place]



     (FREESTYLE      00                                 14             Medical



     VIMAL 2                                         (fourteen)           Branch



     SENSOR) Kit                                         days. Dx           



                                                  E11.42           

 

     insulin            Yes       [id number] 46U       inject 46           U

nivers



     glargine      8-19                               Units           ity of



     U-300 conc      00:00:                               under the           [facility]

[facility]



     (TOUJEO      00                                 skin           Medical



     SOLOSTAR                                         daily.           Branch



     U-300                                                        



     INSULIN)                                                        



     300 unit/mL                                                        



     (1.5 mL)                                                        



     InPn                                                        

 

     pen needle,            Yes       [id number] 1{each}      inject 1      

     [facility]



     diabetic      8-19                               Each under           ity o

f



     (NOVOFINE      00:00:                               the skin 4           [facility]

[facility]



     PLUS) 32      00                                 (four)           Medical



     gauge x                                         times           Branch



     1/6" Ndle                                         daily.           

 

     gabapentin            Yes       [id number] 300mg      Take 1           

[facility]



     300 mg      8-19                               capsule by           ity of



     capsule      00:00:                               mouth in           [place]



               00                                 the            Medical



                                                  morning           Branch



                                                  and 1           



                                                  capsule in           



                                                  the            



                                                  evening.           

 

     dulaglutide            Yes       [id number]           Inject 1.5       

    [facility]



     (TRULICITY)      8-19                               mg weekly           ity

 of



     1.5 mg/0.5      00:00:                                              [place]



     mL PnIj      00                                                Medical



                                                                 Branch

 

     insulin            Yes       [id number]           INJECT 12           U

nivers



     aspart      8-19                               UNITS           ity of



     U-100      00:00:                               BEFORE           [place]



     (NOVOLOG      00                                 BREAKFAST,           Medic

al



     FLEXPEN                                         12 UNITS           Branch



     U-100                                         FOR LUNCH           



     INSULIN)                                         AND 12           



     100 unit/mL                                         UNITS FOR           



     (3 mL)                                         DINNER           



     injection                                                        

 

     Insulin            Yes       [id number]           USE AS           Univ

ers



     [place],      8-19                               DIRECTED 4           ity o

f



     Disposable,      00:00:                               TIMES A           [facility]

[facility]



     (BD INSULIN                                       DAY            Medical



     PEN NEEDLE                                         (E11.9)           Branch



     UF) 31                                                        



     gauge x                                                        



     /16" Ndle                                                        

 

     lisinopriL            Yes       [id number] 10mg      Take 2           [facility]

[facility]



     5 mg tablet      8-19                               tablets by           it

y of



               00:00:                               mouth in           [place]



               00                                 the            Medical



                                                  morning.           Branch

 

     atorvastati            Yes       [id number] 20mg      Take 1           

[facility]



     n 20 mg      8-19                               tablet by           [facility] of



     tablet      00:00:                               mouth at           [place]



               00                                 bedtime.           Medical



                                                                 Branch

 

     flash            Yes       [id number] 1{each}      Apply 1           Un

[facility]



     glucose      8-19                               Each to           ity of



     sensor      00:00:                               skin every           [place]



     (FREESTYLE      00                                 14             Medical



     VIMAL 2                                         (fourteen)           Branch



     SENSOR) Kit                                         days. Dx           



                                                  E11.42           

 

     insulin            Yes       [id number] 46U       inject 46           U

nivers



     glargine      8-19                               Units           ity of



     U-300 conc      00:00:                               under the           [facility]

[facility]



     (TOUJEO      00                                 skin           Medical



     SOLOSTAR                                         daily.           Branch



     U-300                                                        



     INSULIN)                                                        



     300 unit/mL                                                        



     (1.5 mL)                                                        



     InPn                                                        

 

     pen needle,            Yes       [id number] 1{each}      inject 1      

     [facility]



     diabetic      8-19                               Each under           ity o

f



     (NOVOFINE      00:00:                               the skin 4           [facility]

[facility]



     PLUS) 32      00                                 (four)           Medical



     gauge x                                         times           Branch



     1/6" Ndle                                         daily.           

 

     gabapentin            Yes       [id number] 300mg      Take 1           

[facility]



     300 mg      8-19                               capsule by           ity of



     capsule      00:00:                               mouth in           [place]



               00                                 the            Medical



                                                  morning           Branch



                                                  and 1           



                                                  capsule in           



                                                  the            



                                                  evening.           

 

     dulaglutide            Yes       [id number]           Inject 1.5       

    [facility]



     (TRULICITY)      8-19                               mg weekly           ity

 of



     1.5 mg/0.5      00:00:                                              [place]



     mL PnIj      00                                                Medical



                                                                 Branch

 

     insulin            Yes       [id number]           INJECT 12           U

nivers



     aspart      8-19                               UNITS           ity of



     U-100      00:00:                               BEFORE           [place]



     (NOVOLOG      00                                 BREAKFAST,           Medic

al



     FLEXPEN                                         12 UNITS           Branch



     U-100                                         FOR LUNCH           



     INSULIN)                                         AND 12           



     100 unit/mL                                         UNITS FOR           



     (3 mL)                                         DINNER           



     injection                                                        

 

     Insulin            Yes       [id number]           USE AS           Univ

ers



     [place],      8-19                               DIRECTED 4           ity o

f



     Disposable,      00:00:                               TIMES A           [facility]

as



     (BD INSULIN                                       DAY            Medical



     PEN NEEDLE                                         (E11.9)           Branch



     UF) 31                                                        



     gauge x                                                        



     5/16" Ndle                                                        

 

     lisinopriL            Yes       [id number] 10mg      Take 2           Un

[facility]



     5 mg tablet      8-19                               tablets by           it

y of



               00:00:                               mouth in           [place]



               00                                 the            Medical



                                                  morning.           Branch

 

     atorvastati            Yes       [id number] 20mg      Take 1           

[facility]



     n 20 mg      8-19                               tablet by           ity of



     tablet      00:00:                               mouth at           [place]



               00                                 bedtime.           Medical



                                                                 Branch

 

     flash            Yes       [id number] 1{each}      Apply 1           Un

[facility]



     glucose      8-19                               Each to           ity of



     sensor      00:00:                               skin every           [place]



     (FREESTYLE      00                                 14             Medical



     VIMAL 2                                         (fourteen)           Branch



     SENSOR) Kit                                         days. Dx           



                                                  E11.42           

 

     insulin            Yes       [id number] 46U       inject 46           U

nivers



     glargine      8-19                               Units           ity of



     U-300 conc      00:00:                               under the           [facility]

[facility]



     (TOUJEO      00                                 skin           Medical



     SOLOSTAR                                         daily.           Branch



     U-300                                                        



     INSULIN)                                                        



     300 unit/mL                                                        



     (1.5 mL)                                                        



     InPn                                                        

 

     pen needle,            Yes       [id number] 1{each}      inject 1      

     [facility]



     diabetic      8-19                               Each under           ity o

f



     (NOVOFINE      00:00:                               the skin 4           [facility]

[facility]



     PLUS) 32      00                                 (four)           Medical



     gauge x                                         times           Branch



     1/6" Ndle                                         daily.           

 

     gabapentin            Yes       [id number] 300mg      Take 1           

[facility]



     300 mg      8-19                               capsule by           ity of



     capsule      00:00:                               mouth in           [place]



               00                                 the            Medical



                                                  morning           Branch



                                                  and 1           



                                                  capsule in           



                                                  the            



                                                  evening.           

 

     dulaglutide            Yes       [id number]           Inject 1.5       

    [facility]



     (TRULICITY)      8-19                               mg weekly           ity

 of



     1.5 mg/0.5      00:00:                                              [place]



     mL PnIj      00                                                Medical



                                                                 Branch

 

     insulin            Yes       [id number]           INJECT 12           U

nivers



     aspart      8-19                               UNITS           ity of



     U-100      00:00:                               BEFORE           [place]



     (NOVOLOG      00                                 BREAKFAST,           Medic

al



     FLEXPEN                                         12 UNITS           Branch



     U-100                                         FOR LUNCH           



     INSULIN)                                         AND 12           



     100 unit/mL                                         UNITS FOR           



     (3 mL)                                         DINNER           



     injection                                                        

 

     Insulin            Yes       [id number]           USE AS           Univ

ers



     [place],      8-19                               DIRECTED 4           ity o

f



     Disposable,      00:00:                               TIMES A           [facility]

as



     (BD INSULIN                                       DAY            Medical



     PEN NEEDLE                                         (E11.9)           Branch



     UF) 31                                                        



     gauge x                                                        



     5/16" Ndle                                                        

 

     lisinopriL            Yes       [id number] 10mg      Take 2           [facility]

[facility]



     5 mg tablet      8-19                               tablets by           it

y of



               00:00:                               mouth in           [place]



               00                                 the            Medical



                                                  morning.           Branch

 

     atorvastati            Yes       [id number] 20mg      Take 1           

[facility]



     n 20 mg      8-19                               tablet by           ity of



     tablet      00:00:                               mouth at           [place]



               00                                 bedtime.           Medical



                                                                 Branch

 

     flash            Yes       [id number] 1{each}      Apply 1           Un

[facility]



     glucose      8-19                               Each to           ity of



     sensor      00:00:                               skin every           [place]



     (FREESTYLE      00                                 14             Medical



     VIMAL 2                                         (fourteen)           Branch



     SENSOR) Kit                                         days. Dx           



                                                  E11.42           

 

     insulin            Yes       [id number] 46U       inject 46           U

nivers



     glargine      8-19                               Units           ity of



     U-300 conc      00:00:                               under the           [facility]

[facility]



     (TOUJEO      00                                 skin           Medical



     SOLOSTAR                                         daily.           Branch



     U-300                                                        



     INSULIN)                                                        



     300 unit/mL                                                        



     (1.5 mL)                                                        



     InPn                                                        

 

     pen needle,            Yes       [id number] 1{each}      inject 1      

     [facility]



     diabetic      8-19                               Each under           ity o

f



     (NOVOFINE      00:00:                               the skin 4           [facility]

[facility]



     PLUS) 32      00                                 (four)           Medical



     gauge x                                         times           Branch



     1/6" Ndle                                         daily.           

 

     gabapentin            Yes       [id number] 300mg      Take 1           

[facility]



     300 mg      8-19                               capsule by           ity of



     capsule      00:00:                               mouth in           [place]



               00                                 the            Medical



                                                  morning           Branch



                                                  and 1           



                                                  capsule in           



                                                  the            



                                                  evening.           

 

     dulaglutide            Yes       [id number]           Inject 1.5       

    [facility]



     (TRULICITY)      8-19                               mg weekly           ity

 of



     1.5 mg/0.5      00:00:                                              [place]



     mL PnIj      00                                                Medical



                                                                 Branch

 

     insulin            Yes       [id number]           INJECT 12           U

nivers



     aspart      8-19                               UNITS           ity of



     U-100      00:00:                               BEFORE           [place]



     (NOVOLOG      00                                 BREAKFAST,           Medic

al



     FLEXPEN                                         12 UNITS           Branch



     U-100                                         FOR LUNCH           



     INSULIN)                                         AND 12           



     100 unit/mL                                         UNITS FOR           



     (3 mL)                                         DINNER           



     injection                                                        

 

     Insulin            Yes       [id number]           USE AS           Univ

ers



     [place],      8-19                               DIRECTED 4           ity o

f



     Disposable,      00:00:                               TIMES A           [facility]

[facility]



     (BD INSULIN      00                                 DAY            Medical



     PEN NEEDLE                                         (E11.9)           Branch



     UF) 31                                                        



     gauge x                                                        



     5/16" Ndle                                                        

 

     lisinopriL            Yes       [id number] 10mg      Take 2           [facility]

[facility]



     5 mg tablet      8-19                               tablets by           it

y of



               00:00:                               mouth in           [place]



               00                                 the            Medical



                                                  morning.           Branch

 

     atorvastati            Yes       [id number] 20mg      Take 1           

[facility]



     n 20 mg      8-19                               tablet by           ity of



     tablet      00:00:                               mouth at           [place]



               00                                 bedtime.           Medical



                                                                 Branch

 

     flash            Yes       [id number] 1{each}      Apply 1           Un

[facility]



     glucose      8-19                               Each to           ity of



     sensor      00:00:                               skin every           [place]



     (FREESTYLE      00                                 14             Medical



     VIMAL 2                                         (fourteen)           Branch



     SENSOR) Kit                                         days. Dx           



                                                  E11.42           

 

     insulin            Yes       [id number] 46U       inject 46           U

nivers



     glargine      8-19                               Units           ity of



     U-300 conc      00:00:                               under the           [facility]

[facility]



     (TOUJEO      00                                 skin           Medical



     SOLOSTAR                                         daily.           Branch



     U-300                                                        



     INSULIN)                                                        



     300 unit/mL                                                        



     (1.5 mL)                                                        



     InPn                                                        

 

     pen needle,            Yes       [id number] 1{each}      inject 1      

     [facility]



     diabetic      8-19                               Each under           ity o

f



     (NOVOFINE      00:00:                               the skin 4           [facility]

[facility]



     PLUS) 32      00                                 (four)           Medical



     gauge x                                         times           Branch



     1/6" Ndle                                         daily.           

 

     gabapentin            Yes       [id number] 300mg      Take 1           

[facility]



     300 mg      8-19                               capsule by           ity of



     capsule      00:00:                               mouth in           [place]



               00                                 the            Medical



                                                  morning           Branch



                                                  and 1           



                                                  capsule in           



                                                  the            



                                                  evening.           

 

     dulaglutide            Yes       [id number]           Inject 1.5       

    [facility]



     (TRULICITY)      8-19                               mg weekly           ity

 of



     1.5 mg/0.5      00:00:                                              [place]



     mL PnIj      00                                                Medical



                                                                 Branch

 

     insulin            Yes       [id number]           INJECT 12           U

nivers



     aspart      8-19                               UNITS           ity of



     U-100      00:00:                               BEFORE           [place]



     (NOVOLOG      00                                 BREAKFAST,           Medic

al



     FLEXPEN                                         12 UNITS           Branch



     U-100                                         FOR LUNCH           



     INSULIN)                                         AND 12           



     100 unit/mL                                         UNITS FOR           



     (3 mL)                                         DINNER           



     injection                                                        

 

     Insulin            Yes       [id number]           USE AS           Univ

ers



     [place],      8-19                               DIRECTED 4           ity o

f



     Disposable,      00:00:                               TIMES A           [facility]

[facility]



     (BD INSULIN      00                                 DAY            Medical



     PEN NEEDLE                                         (E11.9)           Branch



     UF) 31                                                        



     gauge x                                                        



     /16" Ndle                                                        

 

     lisinopriL            Yes       [id number] 10mg      Take 2           Un

[facility]



     5 mg tablet      8-19                               tablets by           it

y of



               00:00:                               mouth in           [place]



               00                                 the            Medical



                                                  morning.           Branch

 

     atorvastati            Yes       [id number] 20mg      Take 1           

[facility]



     n 20 mg      8-19                               tablet by           ity of



     tablet      00:00:                               mouth at           [place]



               00                                 bedtime.           Medical



                                                                 Branch

 

     flash            Yes       [id number] 1{each}      Apply 1           Un

[facility]



     glucose      8-19                               Each to           ity of



     sensor      00:00:                               skin every           [place]



     (FREESTYLE      00                                 14             Medical



     VIMAL 2                                         (fourteen)           Branch



     SENSOR) Kit                                         days. Dx           



                                                  E11.42           

 

     insulin            Yes       [id number] 46U       inject 46           U

nivers



     glargine      8-19                               Units           ity of



     U-300 conc      00:00:                               under the           [facility]

[facility]



     (TOUJEO      00                                 skin           Medical



     SOLOSTAR                                         daily.           Branch



     U-300                                                        



     INSULIN)                                                        



     300 unit/mL                                                        



     (1.5 mL)                                                        



     InPn                                                        

 

     pen needle,            Yes       [id number] 1{each}      inject 1      

     [facility]



     diabetic      8-19                               Each under           ity o

f



     (NOVOFINE      00:00:                               the skin 4           [facility]

[facility]



     PLUS) 32      00                                 (four)           Medical



     gauge x                                         times           Branch



     1/6" Ndle                                         daily.           

 

     gabapentin            Yes       [id number] 300mg      Take 1           

[facility]



     300 mg      8-19                               capsule by           ity of



     capsule      00:00:                               mouth in           [place]



               00                                 the            Medical



                                                  morning           Branch



                                                  and 1           



                                                  capsule in           



                                                  the            



                                                  evening.           

 

     dulaglutide            Yes       [id number]           Inject 1.5       

    [facility]



     (TRULICITY)      8-19                               mg weekly           ity

 of



     1.5 mg/0.5      00:00:                                              [place]



     mL PnIj      00                                                Medical



                                                                 Branch

 

     insulin            Yes       [id number]           INJECT 12           U

nivers



     aspart      8-19                               UNITS           ity of



     U-100      00:00:                               BEFORE           [place]



     (NOVOLOG      00                                 BREAKFAST,           Medic

al



     FLEXPEN                                         12 UNITS           Branch



     U-100                                         FOR LUNCH           



     INSULIN)                                         AND 12           



     100 unit/mL                                         UNITS FOR           



     (3 mL)                                         DINNER           



     injection                                                        

 

     Insulin            Yes       [id number]           USE AS           Univ

ers



     [place],      8-19                               DIRECTED 4           ity o

f



     Disposable,      00:00:                               TIMES A           [facility]

as



     (BD INSULIN      00                                 DAY            Medical



     PEN NEEDLE                                         (E11.9)           Branch



     UF) 31                                                        



     gauge x                                                        



     5/16" Ndle                                                        

 

     lisinopriL            Yes       [id number] 10mg      Take 2           Un

emely



     5 mg tablet      8-19                               tablets by           it

y of



               00:00:                               mouth in           [place]



               00                                 the            Medical



                                                  morning.           Branch

 

     atorvastati            Yes       [id number] 20mg      Take 1           

[facility]



     n 20 mg      8-19                               tablet by           ity of



     tablet      00:00:                               mouth at           [place]



               00                                 bedtime.           Medical



                                                                 Branch

 

     flash            Yes       [id number] 1{each}      Apply 1           Un

em[facility]



     glucose      8-19                               Each to           ity of



     sensor      00:00:                               skin every           [place]



     (FREESTYLE      00                                 14             Medical



     VIMAL 2                                         (fourteen)           Branch



     SENSOR) Kit                                         days. Dx           



                                                  E11.42           

 

     insulin            Yes       [id number] 46U       inject 46           U

nivers



     glargine      8-19                               Units           ity of



     U-300 conc      00:00:                               under the           [facility]

[facility]



     (TOUJEO      00                                 skin           Medical



     SOLOSTAR                                         daily.           Branch



     U-300                                                        



     INSULIN)                                                        



     300 unit/mL                                                        



     (1.5 mL)                                                        



     InPn                                                        

 

     pen needle,            Yes       [id number] 1{each}      inject 1      

     [facility]



     diabetic      8-19                               Each under           ity o

f



     (NOVOFINE      00:00:                               the skin 4           [facility]

[facility]



     PLUS) 32      00                                 (four)           Medical



     gauge x                                         times           Branch



     1/6" Ndle                                         daily.           

 

     gabapentin            Yes       [id number] 300mg      Take 1           

[facility]



     300 mg      8-19                               capsule by           ity of



     capsule      00:00:                               mouth in           [place]



               00                                 the            Medical



                                                  morning           Branch



                                                  and 1           



                                                  capsule in           



                                                  the            



                                                  evening.           

 

     dulaglutide            Yes       [id number]           Inject 1.5       

    [facility]



     (TRULICITY)      8-19                               mg weekly           ity

 of



     1.5 mg/0.5      00:00:                                              [place]



     mL PnIj      00                                                Medical



                                                                 Branch

 

     insulin            Yes       [id number]           INJECT 12           U

nivers



     aspart      8-19                               UNITS           ity of



     U-100      00:00:                               BEFORE           [place]



     (NOVOLOG      00                                 BREAKFAST,           Medic

al



     FLEXPEN                                         12 UNITS           Branch



     U-100                                         FOR LUNCH           



     INSULIN)                                         AND 12           



     100 unit/mL                                         UNITS FOR           



     (3 mL)                                         DINNER           



     injection                                                        

 

     Insulin            Yes       [id number]           USE AS           Univ

ers



     [place],      8-19                               DIRECTED 4           ity o

f



     Disposable,      00:00:                               TIMES A           [facility]

[facility]



     (BD INSULIN      00                                 DAY            Medical



     PEN NEEDLE                                         (E11.9)           Branch



     UF) 31                                                        



     gauge x                                                        



     5/16" Ndle                                                        

 

     lisinopriL            Yes       [id number] 10mg      Take 2           Un

emely



     5 mg tablet      8-19                               tablets by           it

y of



               00:00:                               mouth in           [place]



               00                                 the            Medical



                                                  morning.           Branch

 

     atorvastati            Yes       [id number] 20mg      Take 1           

[facility]



     n 20 mg      8-19                               tablet by           ity of



     tablet      00:00:                               mouth at           [place]



               00                                 bedtime.           Medical



                                                                 Branch

 

     flash            Yes       [id number] 1{each}      Apply 1           Un

emely



     glucose      8-19                               Each to           ity of



     sensor      00:00:                               skin every           [place]



     (FREESTYLE      00                                 14             Medical



     VIMAL 2                                         (fourteen)           Branch



     SENSOR) Kit                                         days. Dx           



                                                  E11.42           

 

     insulin            Yes       [id number] 46U       inject 46           U

nivers



     glargine      8-19                               Units           ity of



     U-300 conc      00:00:                               under the           [facility]

[facility]



     (TOUJEO      00                                 skin           Medical



     SOLOSTAR                                         daily.           Branch



     U-300                                                        



     INSULIN)                                                        



     300 unit/mL                                                        



     (1.5 mL)                                                        



     InPn                                                        

 

     pen needle,            Yes       [id number] 1{each}      inject 1      

     [facility]



     diabetic      8-19                               Each under           ity o

f



     (NOVOFINE      00:00:                               the skin 4           [facility]

[facility]



     PLUS) 32      00                                 (four)           Medical



     gauge x                                         times           Branch



     1/6" Ndle                                         daily.           

 

     gabapentin            Yes       [id number] 300mg      Take 1           

[facility]



     300 mg      8-19                               capsule by           ity of



     capsule      00:00:                               mouth in           [place]



               00                                 the            Medical



                                                  morning           Branch



                                                  and 1           



                                                  capsule in           



                                                  the            



                                                  evening.           

 

     dulaglutide            Yes       [id number]           Inject 1.5       

    [facility]



     (TRULICITY)      8-19                               mg weekly           ity

 of



     1.5 mg/0.5      00:00:                                              [place]



     mL PnIj      00                                                Medical



                                                                 Branch

 

     insulin            Yes       [id number]           INJECT 12           U

nivers



     aspart      8-19                               UNITS           ity of



     U-100      00:00:                               BEFORE           [place]



     (NOVOLOG      00                                 BREAKFAST,           Medic

al



     FLEXPEN                                         12 UNITS           Branch



     U-100                                         FOR LUNCH           



     INSULIN)                                         AND 12           



     100 unit/mL                                         UNITS FOR           



     (3 mL)                                         DINNER           



     injection                                                        

 

     Insulin            Yes       [id number]           USE AS           Univ

ers



     [place],      8-19                               DIRECTED 4           ity o

f



     Disposable,      00:00:                               TIMES A           [facility]

[facility]



     (BD INSULIN      00                                 DAY            Medical



     PEN NEEDLE                                         (E11.9)           Branch



     UF) 31                                                        



     gauge x                                                        



     16" Ndle                                                        

 

     lisinopriL            Yes       [id number] 10mg      Take 2           Un

emely



     5 mg tablet      8-19                               tablets by           it

y of



               00:00:                               mouth in           [place]



               00                                 the            Medical



                                                  morning.           Branch

 

     atorvastati            Yes       [id number] 20mg      Take 1           

[facility]



     n 20 mg      8-19                               tablet by           ity of



     tablet      00:00:                               mouth at           [place]



               00                                 bedtime.           Medical



                                                                 Branch

 

     flash            Yes       [id number] 1{each}      Apply 1           Un

emely



     glucose      8-19                               Each to           ity of



     sensor      00:00:                               skin every           [place]



     (FREESTYLE      00                                 14             Medical



     VIMAL 2                                         (fourteen)           Branch



     SENSOR) Kit                                         days. Dx           



                                                  E11.42           

 

     insulin            Yes       [id number] 46U       inject 46           U

nivers



     glargine      8-19                               Units           ity of



     U-300 conc      00:00:                               under the           [facility]

[facility]



     (TOUJEO      00                                 skin           Medical



     SOLOSTAR                                         daily.           Branch



     U-300                                                        



     INSULIN)                                                        



     300 unit/mL                                                        



     (1.5 mL)                                                        



     InPn                                                        

 

     pen needle,            Yes       [id number] 1{each}      inject 1      

     [facility]



     diabetic      8-19                               Each under           ity o

f



     (NOVOFINE      00:00:                               the skin 4           [facility]

[facility]



     PLUS) 32      00                                 (four)           Medical



     gauge x                                         times           Branch



     1/6" Ndle                                         daily.           

 

     gabapentin            Yes       [id number] 300mg      Take 1           

[facility]



     300 mg      8-19                               capsule by           ity of



     capsule      00:00:                               mouth in           [place]



               00                                 the            Medical



                                                  morning           Branch



                                                  and 1           



                                                  capsule in           



                                                  the            



                                                  evening.           

 

     dulaglutide            Yes       [id number]           Inject 1.5       

    [facility]



     (TRULICITY)      8-19                               mg weekly           ity

 of



     1.5 mg/0.5      00:00:                                              [place]



     mL PnIj      00                                                Medical



                                                                 Branch

 

     insulin            Yes       [id number]           INJECT 12           U

nivers



     aspart      8-19                               UNITS           ity of



     U-100      00:00:                               BEFORE           [place]



     (NOVOLOG      00                                 BREAKFAST,           Medic

al



     FLEXPEN                                         12 UNITS           Branch



     U-100                                         FOR LUNCH           



     INSULIN)                                         AND 12           



     100 unit/mL                                         UNITS FOR           



     (3 mL)                                         DINNER           



     injection                                                        

 

     Insulin            Yes       [id number]           USE AS           Univ

ers



     [place],      8-19                               DIRECTED 4           ity o

f



     Disposable,      00:00:                               TIMES A           [facility]

[facility]



     (BD INSULIN                                       DAY            Medical



     PEN NEEDLE                                         (E11.9)           Branch



     UF) 31                                                        



     gauge x                                                        



     5/16" Ndle                                                        

 

     lisinopriL            Yes       [id number] 10mg      Take 2           Un

emely



     5 mg tablet      8-19                               tablets by           it

y of



               00:00:                               mouth in           [place]



               00                                 the            Medical



                                                  morning.           Branch

 

     atorvastati            Yes       [id number] 20mg      Take 1           

[facility]



     n 20 mg      8-19                               tablet by           ity of



     tablet      00:00:                               mouth at           [place]



               00                                 bedtime.           Medical



                                                                 Branch

 

     flash            Yes       [id number] 1{each}      Apply 1           Un

em[facility]



     glucose      8-19                               Each to           ity of



     sensor      00:00:                               skin every           [place]



     (FREESTYLE      00                                 14             Medical



     VIMAL 2                                         (fourteen)           Branch



     SENSOR) Kit                                         days. Dx           



                                                  E11.42           

 

     insulin            Yes       [id number] 46U       inject 46           U

nivers



     glargine      8-19                               Units           ity of



     U-300 conc      00:00:                               under the           [facility]

[facility]



     (TOUJEO      00                                 skin           Medical



     SOLOSTAR                                         daily.           Branch



     U-300                                                        



     INSULIN)                                                        



     300 unit/mL                                                        



     (1.5 mL)                                                        



     InPn                                                        

 

     pen needle,            Yes       [id number] 1{each}      inject 1      

     [facility]



     diabetic      8-19                               Each under           ity o

f



     (NOVOFINE      00:00:                               the skin 4           [facility]

[facility]



     PLUS) 32      00                                 (four)           Medical



     gauge x                                         times           Branch



     " Ndle                                         daily.           

 

     gabapentin            Yes       [id number] 300mg      Take 1           

[facility]



     300 mg      8-19                               capsule by           ity of



     capsule      00:00:                               mouth in           [place]



               00                                 the            Medical



                                                  morning           Branch



                                                  and 1           



                                                  capsule in           



                                                  the            



                                                  evening.           

 

     insulin            Yes       [id number]           INJECT 12           U

nivers



     aspart      8-19                               UNITS           ity of



     U-100      00:00:                               BEFORE           [place]



     (NOVOLOG      00                                 BREAKFAST,           Medic

al



     FLEXPEN                                         12 UNITS           Branch



     U-100                                         FOR LUNCH           



     INSULIN)                                         AND 12           



     100 unit/mL                                         UNITS FOR           



     (3 mL)                                         DINNER           



     injection                                                        

 

     Insulin            Yes       [id number]           USE AS           Univ

ers



     [place],      8-19                               DIRECTED 4           ity o

f



     Disposable,      00:00:                               TIMES A           [facility]

[facility]



     (BD INSULIN                                       DAY            Medical



     PEN NEEDLE                                         (E11.9)           Branch



     UF) 31                                                        



     gauge x                                                        



     " Ndle                                                        

 

     lisinopriL            Yes       [id number] 10mg      Take 2           Un

emely



     5 mg tablet      8-19                               tablets by           it

y of



               00:00:                               mouth in           [place]



               00                                 the            Medical



                                                  morning.           Branch

 

     atorvastati            Yes       [id number] 20mg      Take 1           

[facility]



     n 20 mg      8-19                               tablet by           ity of



     tablet      00:00:                               mouth at           [place]



               00                                 bedtime.           Medical



                                                                 Branch

 

     flash            Yes       [id number] 1{each}      Apply 1           Un

emely



     glucose      8-19                               Each to           ity of



     sensor      00:00:                               skin every           [place]



     (FREESTYLE      00                                 14             Medical



     VIMAL 2                                         (fourteen)           Branch



     SENSOR) Kit                                         days. Dx           



                                                  E11.42           

 

     insulin            Yes       [id number] 46U       inject 46           U

nivers



     glargine      8-19                               Units           ity of



     U-300 conc      00:00:                               under the           [facility]

[facility]



     (TOUJEO      00                                 skin           Medical



     SOLOSTAR                                         daily.           Branch



     U-300                                                        



     INSULIN)                                                        



     300 unit/mL                                                        



     (1.5 mL)                                                        



     InPn                                                        

 

     pen needle,            Yes       [id number] 1{each}      inject 1      

     [facility]



     diabetic      8-19                               Each under           ity o

f



     (NOVOFINE      00:00:                               the skin 4           [facility]

[facility]



     PLUS) 32      00                                 (four)           Medical



     gauge x                                         times           [facility]



     " Ndle                                         daily.           

 

     gabapentin            Yes       [id number] 300mg      Take 1           

[facility]



     300 mg      8-19                               capsule by           [facility]



     capsule      00:00:                               mouth in           [place]



               00                                 the            Medical



                                                  morning           [facility]



                                                  and 1           



                                                  capsule in           



                                                  the            



                                                  evening.           

 

     insulin            Yes       [id number]           INJECT 12           U

n[facility]



     aspart      8-19                               UNITS           ity of



     U-100      00:00:                               BEFORE           [place]



     (NOVOLOG      00                                 BREAKFAST,           Medic

al



     FLEXPEN                                         12 UNITS           [facility]



     U-100                                         FOR LUNCH           



     INSULIN)                                         AND 12           



     100 unit/mL                                         UNITS FOR           



     (3 mL)                                         DINNER           



     injection                                                        

 

     Insulin            Yes       [id number]           USE AS           Un[facility]

ers



     [place],      8-19                               DIRECTED 4           ity o

f



     Disposable,      00:00:                               TIMES A           [facility]

[facility]



     (BD INSULIN      00                                 DAY            Medical



     PEN NEEDLE                                         (E11.9)           [facility]



     UF) 31                                                        



     gauge x                                                        



     " Ndle                                                        

 

     lisinopriL            Yes       [id number] 10mg      Take 2           Un

[facility]



     5 mg tablet      8-19                               tablets by           it

y of



               00:00:                               mouth in           [place]



               00                                 the            Medical



                                                  morning.           [facility]

 

     atorvastati            Yes       [id number] 20mg      Take 1           

[facility]



     n 20 mg      8-19                               tablet by           [facility]



     tablet      00:00:                               mouth at           [place]



               00                                 bedtime.           Medical



                                                                 [facility]

 

     flash            Yes       [id number] 1{each}      Apply 1           Un

[facility]



     glucose      8-19                               Each to           [facility] of



     sensor      00:00:                               skin every           [place]



     (FREESTYLE      00                                 14             Medical



     VIMAL 2                                         (fourteen)           [facility]



     SENSOR) Kit                                         days. Dx           



                                                  E11.42           

 

     insulin            Yes       [id number] 46U       inject 46           U

n[facility]



     glargine      8-19                               Units           [facility]



     U-300 conc      00:00:                               under the           [facility]

[facility]



     (TOUJEO      00                                 skin           Medical



     SOLOSTAR                                         daily.           [facility]



     U-300                                                        



     INSULIN)                                                        



     300 unit/mL                                                        



     (1.5 mL)                                                        



     InPn                                                        

 

     pen needle,            Yes       [id number] 1{each}      inject 1      

     [facility]



     diabetic      8-19                               Each under           [facility] o

f



     (NOVOFINE      00:00:                               the skin 4           [facility]

[facility]



     PLUS) 32      00                                 (four)           Medical



     gauge x                                         times           [facility]



     /" Ndle                                         daily.           

 

     gabapentin            Yes       [id number] 300mg      Take 1           

[facility]



     300 mg      8-19                               capsule by           [facility]



     capsule      00:00:                               mouth in           [place]



               00                                 the            Medical



                                                  morning           [facility]



                                                  and 1           



                                                  capsule in           



                                                  the            



                                                  evening.           

 

     insulin            Yes       [id number]           INJECT 12           U

n[facility]



     aspart      8-19                               UNITS           ity of



     U-100      00:00:                               BEFORE           [place]



     (NOVOLOG      00                                 BREAKFAST,           Medic

al



     FLEXPEN                                         12 UNITS           [facility]



     U-100                                         FOR LUNCH           



     INSULIN)                                         AND 12           



     100 unit/mL                                         UNITS FOR           



     (3 mL)                                         DINNER           



     injection                                                        

 

     Insulin            Yes       [id number]           USE AS           Univ

ers



     [place],      8-19                               DIRECTED 4           ity o

f



     Disposable,      00:00:                               TIMES A           [facility]

[facility]



     (BD INSULIN      00                                 DAY            Medical



     PEN NEEDLE                                         (E11.9)           [facility]



     UF) 31                                                        



     gauge x                                                        



     /16" Ndle                                                        

 

     lisinopriL            Yes       [id number] 10mg      Take 2           Un

[facility]



     5 mg tablet      8-19                               tablets by           it

y of



               00:00:                               mouth in           [place]



               00                                 the            Medical



                                                  morning.           [facility]

 

     atorvastati            Yes       [id number] 20mg      Take 1           

[facility]



     n 20 mg      8-19                               tablet by           [facility]



     tablet      00:00:                               mouth at           [place]



               00                                 bedtime.           Medical



                                                                 [facility]

 

     flash            Yes       [id number] 1{each}      Apply 1           Un

[facility]



     glucose      8-19                               Each to           [facility]



     sensor      00:00:                               skin every           [place]



     (FREESTYLE      00                                 14             Medical



     VIMAL 2                                         (fourteen)           [facility]



     SENSOR) Kit                                         days. Dx           



                                                  E11.42           

 

     insulin            Yes       [id number] 46U       inject 46           U

n[facility]



     glargine      8-19                               Units           [facility]



     U-300 conc      00:00:                               under the           [facility]

[facility]



     (TOUJEO      00                                 skin           Medical



     SOLOSTAR                                         daily.           [facility]



     U-300                                                        



     INSULIN)                                                        



     300 unit/mL                                                        



     (1.5 mL)                                                        



     InPn                                                        

 

     pen needle,            Yes       [id number] 1{each}      inject 1      

     [facility]



     diabetic      8-19                               Each under           [facility] o

f



     (NOVOFINE      00:00:                               the skin 4           [facility]

[facility]



     PLUS) 32      00                                 (four)           Medical



     gauge x                                         times           [facility]



     /" Ndle                                         daily.           

 

     gabapentin            Yes       [id number] 300mg      Take 1           

[facility]



     300 mg      8-19                               capsule by           [facility]



     capsule      00:00:                               mouth in           [place]



               00                                 the            Medical



                                                  morning           [facility]



                                                  and 1           



                                                  capsule in           



                                                  the            



                                                  evening.           

 

     insulin            Yes       [id number]           INJECT 12           U

n[facility]



     aspart      8-19                               UNITS           ity of



     U-100      00:00:                               BEFORE           [place]



     (NOVOLOG      00                                 BREAKFAST,           Medic

al



     FLEXPEN                                         12 UNITS           [facility]



     U-100                                         FOR LUNCH           



     INSULIN)                                         AND 12           



     100 unit/mL                                         UNITS FOR           



     (3 mL)                                         DINNER           



     injection                                                        

 

     Insulin            Yes       [id number]           USE AS           Un[facility]

ers



     [place],      8-19                               DIRECTED 4           ity o

f



     Disposable,      00:00:                               TIMES A           [facility]

[facility]



     (BD INSULIN      00                                 DAY            Medical



     PEN NEEDLE                                         (E11.9)           [facility]



     UF) 31                                                        



     gauge x                                                        



     5/16" Ndle                                                        

 

     lisinopriL            Yes       [id number] 10mg      Take 2           Un

[facility]



     5 mg tablet      8-19                               tablets by           it

y of



               00:00:                               mouth in           [place]



               00                                 the            Medical



                                                  morning.           [facility]

 

     atorvastati            Yes       [id number] 20mg      Take 1           

[facility]



     n 20 mg      8-19                               tablet by           [facility]



     tablet      00:00:                               mouth at           [place]



               00                                 bedtime.           Medical



                                                                 [facility]

 

     flash            Yes       [id number] 1{each}      Apply 1           Un

[facility]



     glucose      8-19                               Each to           [facility]



     sensor      00:00:                               skin every           [place]



     (FREESTYLE      00                                 14             Medical



     VIMAL 2                                         (fourteen)           [facility]



     SENSOR) Kit                                         days. Dx           



                                                  E11.42           

 

     insulin            Yes       [id number] 46U       inject 46           U

n[facility]



     glargine      8-19                               Units           ity [facility]



     U-300 conc      00:00:                               under the           [facility]

[facility]



     (TOUJEO      00                                 skin           Medical



     SOLOSTAR                                         daily.           [facility]



     U-300                                                        



     INSULIN)                                                        



     300 unit/mL                                                        



     (1.5 mL)                                                        



     InPn                                                        

 

     pen needle,            Yes       [id number] 1{each}      inject 1      

     [facility]



     diabetic      8-19                               Each under           [facility] o

f



     (NOVOFINE      00:00:                               the skin 4           [facility]

[facility]



     PLUS) 32      00                                 (four)           Medical



     gauge x                                         times           [facility]



     1/6" Ndle                                         daily.           

 

     gabapentin            Yes       [id number] 300mg      Take 1           

[facility]



     300 mg      8-19                               capsule by           [facility]



     capsule      00:00:                               mouth in           [place]



               00                                 the            Medical



                                                  morning           [facility]



                                                  and 1           



                                                  capsule in           



                                                  the            



                                                  evening.           

 

     insulin            Yes       [id number]           INJECT 12           [facility]

[facility]



     aspart      8-19                               UNITS           ity of



     U-100      00:00:                               BEFORE           [place]



     (NOVOLOG      00                                 BREAKFAST,           Medic

al



     FLEXPEN                                         12 UNITS           [facility]



     U-100                                         FOR LUNCH           



     INSULIN)                                         AND 12           



     100 unit/mL                                         UNITS FOR           



     (3 mL)                                         DINNER           



     injection                                                        

 

     Insulin            Yes       [id number]           USE AS           Un[facility]

ers



     [place],      8-19                               DIRECTED 4           ity o

f



     Disposable,      00:00:                               TIMES A           [facility]

[facility]



     (BD INSULIN      00                                 DAY            Medical



     PEN NEEDLE                                         (E11.9)           [facility]



     UF) 31                                                        



     gauge x                                                        



     5/16" Ndle                                                        

 

     lisinopriL            Yes       [id number] 10mg      Take 2           Un

[facility]



     5 mg tablet      8-19                               tablets by           it

y of



               00:00:                               mouth in           [place]



               00                                 the            Medical



                                                  morning.           [facility]

 

     atorvastati            Yes       [id number] 20mg      Take 1           

[facility]



     n 20 mg      8-19                               tablet by           [facility]



     tablet      00:00:                               mouth at           [place]



               00                                 bedtime.           Medical



                                                                 [facility]

 

     flash            Yes       [id number] 1{each}      Apply 1           Un

[facility]



     glucose      8-19                               Each to           [facility] of



     sensor      00:00:                               skin every           [place]



     (FREESTYLE      00                                 14             Medical



     VIMAL 2                                         (fourteen)           [facility]



     SENSOR) Kit                                         days. Dx           



                                                  E11.42           

 

     insulin            Yes       [id number] 46U       inject 46           U

n[facility]



     glargine      8-19                               Units           ity of



     U-300 conc      00:00:                               under the           [facility]

[facility]



     (TOUJEO      00                                 skin           Medical



     SOLOSTAR                                         daily.           [facility]



     U-300                                                        



     INSULIN)                                                        



     300 unit/mL                                                        



     (1.5 mL)                                                        



     InPn                                                        

 

     pen needle,            Yes       [id number] 1{each}      inject 1      

     [facility]



     diabetic      8-19                               Each under           [facility] o

f



     (NOVOFINE      00:00:                               the skin 4           [facility]

[facility]



     PLUS) 32      00                                 (four)           Medical



     gauge x                                         times           [facility]



     1/6" Ndle                                         daily.           

 

     gabapentin            Yes       [id number] 300mg      Take 1           

[facility]



     300 mg      8-19                               capsule by           [facility] of



     capsule      00:00:                               mouth in           [place]



               00                                 the            Medical



                                                  morning           [facility]



                                                  and 1           



                                                  capsule in           



                                                  the            



                                                  evening.           

 

     insulin            Yes       [id number]           INJECT 12           U

[facility]



     aspart      8-19                               UNITS           ity of



     U-100      00:00:                               BEFORE           [place]



     (NOVOLOG      00                                 BREAKFAST,           Medic

al



     FLEXPEN                                         12 UNITS           [facility]



     U-100                                         FOR LUNCH           



     INSULIN)                                         AND 12           



     100 unit/mL                                         UNITS FOR           



     (3 mL)                                         DINNER           



     injection                                                        

 

     Insulin            Yes       [id number]           USE AS           Univ

ers



     [place],      8-19                               DIRECTED 4           ity o

f



     Disposable,      00:00:                               TIMES A           [facility]

[facility]



     (BD INSULIN      00                                 DAY            Medical



     PEN NEEDLE                                         (E11.9)           [facility]



     UF) 31                                                        



     gauge x                                                        



     5/16" Ndle                                                        

 

     lisinopriL            Yes       [id number] 10mg      Take 2           Un

[facility]



     5 mg tablet      8-19                               tablets by           it

y of



               00:00:                               mouth in           [place]



               00                                 the            Medical



                                                  morning.           [facility]

 

     atorvastati            Yes       [id number] 20mg      Take 1           

[facility]



     n 20 mg      8-19                               tablet by           ity of



     tablet      00:00:                               mouth at           [place]



               00                                 bedtime.           Medical



                                                                 [facility]

 

     flash            Yes       [id number] 1{each}      Apply 1           Un

[facility]



     glucose      8-19                               Each to           [facility] of



     sensor      00:00:                               skin every           [place]



     (FREESTYLE      00                                 14             Medical



     VIMAL 2                                         (fourteen)           [facility]



     SENSOR) Kit                                         days. Dx           



                                                  E11.42           

 

     insulin            Yes       [id number] 46U       inject 46           U

n[facility]



     glargine      8-19                               Units           ity of



     U-300 conc      00:00:                               under the           [facility]

[facility]



     (TOUJEO      00                                 skin           Medical



     SOLOSTAR                                         daily.           [facility]



     U-300                                                        



     INSULIN)                                                        



     300 unit/mL                                                        



     (1.5 mL)                                                        



     InPn                                                        

 

     pen needle,            Yes       [id number] 1{each}      inject 1      

     [facility]



     diabetic      8-19                               Each under           [facility] o

f



     (NOVOFINE      00:00:                               the skin 4           [facility]

[facility]



     PLUS) 32      00                                 (four)           Medical



     gauge x                                         times           [facility]



     1/6" Ndle                                         daily.           

 

     gabapentin            Yes       [id number] 300mg      Take 1           

[facility]



     300 mg      8-19                               capsule by           ity of



     capsule      00:00:                               mouth in           [place]



               00                                 the            Medical



                                                  morning           [facility]



                                                  and 1           



                                                  capsule in           



                                                  the            



                                                  evening.           

 

     insulin            Yes       [id number]           INJECT 12           U

n[facility]



     aspart      8-19                               UNITS           [facility]



     U-100      00:00:                               BEFORE           [place]



     (NOVOLOG      00                                 BREAKFAST,           Medic

al



     FLEXPEN                                         12 UNITS           [facility]



     U-100                                         FOR LUNCH           



     INSULIN)                                         AND 12           



     100 unit/mL                                         UNITS FOR           



     (3 mL)                                         DINNER           



     injection                                                        

 

     Insulin            Yes       [id number]           USE AS           Univ

ers



     [place],      8-19                               DIRECTED 4           ity o

f



     Disposable,      00:00:                               TIMES A           [facility]

[facility]



     (BD INSULIN                                       DAY            Medical



     PEN NEEDLE                                         (E11.9)           [facility]



     UF) 31                                                        



     gauge x                                                        



     5/16" Ndle                                                        

 

     lisinopriL            Yes       [id number] 10mg      Take 2           Un

[facility]



     5 mg tablet      8-19                               tablets by           it

y of



               00:00:                               mouth in           [place]



               00                                 the            Medical



                                                  morning.           [facility]

 

     atorvastati            Yes       [id number] 20mg      Take 1           

[facility]



     n 20 mg      8-19                               tablet by           [facility]



     tablet      00:00:                               mouth at           [place]



               00                                 bedtime.           Medical



                                                                 [facility]

 

     flash            Yes       [id number] 1{each}      Apply 1           Un

[facility]



     glucose      8-19                               Each to           [facility]



     sensor      00:00:                               skin every           [place]



     (FREESTYLE      00                                 14             Medical



     VIMAL 2                                         (fourteen)           [facility]



     SENSOR) Kit                                         days. Dx           



                                                  E11.42           

 

     insulin            Yes       [id number] 46U       inject 46           U

nivers



     glargine      8-19                               Units           [facility]



     U-300 conc      00:00:                               under the           [facility]

[facility]



     (TOUJEO      00                                 skin           Medical



     SOLOSTAR                                         daily.           [facility]



     U-300                                                        



     INSULIN)                                                        



     300 unit/mL                                                        



     (1.5 mL)                                                        



     InPn                                                        

 

     pen needle,            Yes       [id number] 1{each}      inject 1      

     [facility]



     diabetic      8-19                               Each under           [facility] o

f



     (NOVOFINE      00:00:                               the skin 4           [facility]

[facility]



     PLUS) 32      00                                 (four)           Medical



     gauge x                                         times           [facility]



     1/6" Ndle                                         daily.           

 

     gabapentin            Yes       [id number] 300mg      Take 1           

[facility]



     300 mg      8-19                               capsule by           [facility]



     capsule      00:00:                               mouth in           [place]



               00                                 the            Medical



                                                  morning           [facility]



                                                  and 1           



                                                  capsule in           



                                                  the            



                                                  evening.           

 

     insulin            Yes       [id number]           INJECT 12           U

n[facility]



     aspart      8-19                               UNITS           [facility]



     U-100      00:00:                               BEFORE           [place]



     (NOVOLOG      00                                 BREAKFAST,           Medic

al



     FLEXPEN                                         12 UNITS           [facility]



     U-100                                         FOR LUNCH           



     INSULIN)                                         AND 12           



     100 unit/mL                                         UNITS FOR           



     (3 mL)                                         DINNER           



     injection                                                        

 

     Insulin            Yes       [id number]           USE AS           Univ

ers



     [place],      8-19                               DIRECTED 4           ity o

f



     Disposable,      00:00:                               TIMES A           [facility]

[facility]



     (BD INSULIN                                       DAY            Medical



     PEN NEEDLE                                         (E11.9)           [facility]



     UF) 31                                                        



     gauge x                                                        



     5/16" Ndle                                                        

 

     lisinopriL            Yes       [id number] 10mg      Take 2           Un

[facility]



     5 mg tablet      8-19                               tablets by           it

y of



               00:00:                               mouth in           [place]



               00                                 the            Medical



                                                  morning.           [facility]

 

     atorvastati            Yes       [id number] 20mg      Take 1           

[facility]



     n 20 mg      8-19                               tablet by           [facility]



     tablet      00:00:                               mouth at           [place]



               00                                 bedtime.           Medical



                                                                 [facility]

 

     flash            Yes       [id number] 1{each}      Apply 1           Un

[facility]



     glucose      8-19                               Each to           [facility]



     sensor      00:00:                               skin every           [place]



     (FREESTYLE      00                                 14             Medical



     VIMAL 2                                         (fourteen)           [facility]



     SENSOR) Kit                                         days. Dx           



                                                  E11.42           

 

     insulin            Yes       [id number] 46U       inject 46           U

nivers



     glargine      8-19                               Units           [facility]



     U-300 conc      00:00:                               under the           [facility]

[facility]



     (TOUJEO      00                                 skin           Medical



     SOLOSTAR                                         daily.           [facility]



     U-300                                                        



     INSULIN)                                                        



     300 unit/mL                                                        



     (1.5 mL)                                                        



     InPn                                                        

 

     pen needle,            Yes       [id number] 1{each}      inject 1      

     [facility]



     diabetic      8-19                               Each under           [facility] o

f



     (NOVOFINE      00:00:                               the skin 4           [facility]

[facility]



     PLUS) 32      00                                 (four)           Medical



     gauge x                                         times           [facility]



     1/6" Ndle                                         daily.           

 

     gabapentin            Yes       [id number] 300mg      Take 1           

[facility]



     300 mg      8-19                               capsule by           [facility]



     capsule      00:00:                               mouth in           [place]



               00                                 the            Medical



                                                  morning           [facility]



                                                  and 1           



                                                  capsule in           



                                                  the            



                                                  evening.           

 

     insulin            Yes       [id number]           INJECT 12           U

n[facility]



     aspart      8-19                               UNITS           [facility]



     U-100      00:00:                               BEFORE           [place]



     (NOVOLOG      00                                 BREAKFAST,           Medic

al



     FLEXPEN                                         12 UNITS           [facility]



     U-100                                         FOR LUNCH           



     INSULIN)                                         AND 12           



     100 unit/mL                                         UNITS FOR           



     (3 mL)                                         DINNER           



     injection                                                        

 

     Insulin            Yes       [id number]           USE AS           Univ

ers



     [place],      8-19                               DIRECTED 4           ity o

f



     Disposable,      00:00:                               TIMES A           [facility]

[facility]



     (BD INSULIN      00                                 DAY            Medical



     PEN NEEDLE                                         (E11.9)           [facility]



     UF) 31                                                        



     gauge x                                                        



     5/16" Ndle                                                        

 

     lisinopriL            Yes       [id number] 10mg      Take 2           Un

[facility]



     5 mg tablet      8-19                               tablets by           it

y of



               00:00:                               mouth in           [place]



               00                                 the            Medical



                                                  morning.           [facility]

 

     atorvastati            Yes       [id number] 20mg      Take 1           

[facility]



     n 20 mg      8-19                               tablet by           [facility]



     tablet      00:00:                               mouth at           [place]



               00                                 bedtime.           Medical



                                                                 [facility]

 

     flash            Yes       [id number] 1{each}      Apply 1           Un

[facility]



     glucose      8-19                               Each to           [facility]



     sensor      00:00:                               skin every           [place]



     (FREESTYLE      00                                 14             Medical



     VIMAL 2                                         (fourteen)           [facility]



     SENSOR) Kit                                         days. Dx           



                                                  E11.42           

 

     insulin            Yes       [id number] 46U       inject 46           U

nivers



     glargine      8-19                               Units           [facility]



     U-300 conc      00:00:                               under the           [facility]

[facility]



     (TOUJEO      00                                 skin           Medical



     SOLOSTAR                                         daily.           [facility]



     U-300                                                        



     INSULIN)                                                        



     300 unit/mL                                                        



     (1.5 mL)                                                        



     InPn                                                        

 

     pen needle,            Yes       [id number] 1{each}      inject 1      

     [facility]



     diabetic      8-19                               Each under           [facility] o

f



     (NOVOFINE      00:00:                               the skin 4           [facility]

[facility]



     PLUS) 32      00                                 (four)           Medical



     gauge x                                         times           [facility]



     " Ndle                                         daily.           

 

     gabapentin            Yes       [id number] 300mg      Take 1           

[facility]



     300 mg      8-19                               capsule by           [facility]



     capsule      00:00:                               mouth in           [place]



               00                                 the            Medical



                                                  morning           [facility]



                                                  and 1           



                                                  capsule in           



                                                  the            



                                                  evening.           

 

     insulin            Yes       [id number]           INJECT 12           U

n[facility]



     aspart      8-19                               UNITS           [facility]



     U-100      00:00:                               BEFORE           [place]



     (NOVOLOG      00                                 BREAKFAST,           Medic

al



     FLEXPEN                                         12 UNITS           [facility]



     U-100                                         FOR LUNCH           



     INSULIN)                                         AND 12           



     100 unit/mL                                         UNITS FOR           



     (3 mL)                                         DINNER           



     injection                                                        

 

     Insulin            Yes       [id number]           USE AS           Un[facility]

ers



     [place],      8-                               DIRECTED 4           ity o

f



     Disposable,      00:00:                               TIMES A           [facility]

[facility]



     (BD INSULIN      00                                 DAY            Medical



     PEN NEEDLE                                         (E11.9)           [facility]



     UF) 31                                                        



     gauge x                                                        



     /16" Ndle                                                        

 

     flash            Yes       [id number] 1{each}      Apply 1           Un

[facility]



     glucose      8-19                               Each to           [facility]



     sensor      00:00:                               skin every           [place]



     (FREESTYLE      00                                 14             Medical



     VIMAL 2                                         (fourteen)           [facility]



     SENSOR) Kit                                         days. Dx           



                                                  E11.42           

 

     insulin            Yes       [id number] 46U       inject 46           U

nivers



     glargine      8-19                               Units           ity of



     U-300 conc      00:00:                               under the           [facility]

[facility]



     (TOUJEO      00                                 skin           Medical



     SOLOSTAR                                         daily.           [facility]



     U-300                                                        



     INSULIN)                                                        



     300 unit/mL                                                        



     (1.5 mL)                                                        



     InPn                                                        

 

     pen needle,            Yes       [id number] 1{each}      inject 1      

     [facility]



     diabetic      8-19                               Each under           [facility] o

f



     (NOVOFINE      00:00:                               the skin 4           [facility]

[facility]



     PLUS) 32      00                                 (four)           Medical



     gauge x                                         times           [facility]



     " Ndle                                         daily.           

 

     gabapentin            Yes       [id number] 300mg      Take 1           

[facility]



     300 mg      8-19                               capsule by           [facility]



     capsule      00:00:                               mouth in           [place]



               00                                 the            Medical



                                                  morning           [facility]



                                                  and 1           



                                                  capsule in           



                                                  the            



                                                  evening.           

 

     Insulin            Yes       [id number]           USE AS           Univ

ers



     [place],      8-                               DIRECTED 4           ity o

f



     Disposable,      00:00:                               TIMES A           [facility]

[facility]



     (BD INSULIN                                       DAY            Medical



     PEN NEEDLE                                         (E11.9)           [facility]



     UF) 31                                                        



     gauge x                                                        



     5/16" Ndle                                                        

 

     pen needle,            Yes       [id number] 1{each}      inject 1      

     [facility]



     diabetic      8-19                               Each under           ity o

f



     (NOVOFINE      00:00:                               the skin 4           [facility]

[facility]



     PLUS) 32      00                                 (four)           Medical



     gauge x                                         times           [facility]



     1/6" Ndle                                         daily.           

 

     Insulin            Yes       [id number]           USE AS           Univ

ers



     [place],      8                               DIRECTED 4           ity o

f



     Disposable,      00:00:                               TIMES A           [facility]

[facility]



     (BD INSULIN                                       DAY            Medical



     PEN NEEDLE                                         (E11.9)           [facility]



     UF) 31                                                        



     gauge x                                                        



     5/16" Ndle                                                        

 

     pen needle,            Yes       [id number] 1{each}      inject 1      

     [facility]



     diabetic      8-19                               Each under           [facility] o

f



     (NOVOFINE      00:00:                               the skin 4           [facility]

[facility]



     PLUS) 32      00                                 (four)           Medical



     gauge x                                         times           [facility]



     1/6" Ndle                                         daily.           

 

     Insulin            Yes       [id number]           USE AS           Univ

ers



     [place],                                     DIRECTED 4           ity o

f



     Disposable,      00:00:                               TIMES A           [facility]

[facility]



     (BD INSULIN                                       DAY            Medical



     PEN NEEDLE                                         (E11.9)           [facility]



     UF) 31                                                        



     gauge x                                                        



     5/16" Ndle                                                        

 

     pen needle,            Yes       [id number] 1{each}      inject 1      

     [facility]



     diabetic      8-19                               Each under           ity o

f



     (NOVOFINE      00:00:                               the skin 4           [facility]

[facility]



     PLUS) 32      00                                 (four)           Medical



     gauge x                                         times           [facility]



     1/6" Ndle                                         daily.           

 

     Insulin            Yes       [id number]           USE AS           Un[facility]

ers



     [place],                                     DIRECTED 4           ity o

f



     Disposable,      00:00:                               TIMES A           [facility]

[facility]



     (BD INSULIN                                       DAY            Medical



     PEN NEEDLE                                         (E11.9)           [facility]



     UF) 31                                                        



     gauge x                                                        



     5/16" Ndle                                                        

 

     pen needle,            Yes       [id number] 1{each}      inject 1      

     [facility]



     diabetic      8-19                               Each under           ity o

f



     (NOVOFINE      00:00:                               the skin 4           [facility]

[facility]



     PLUS) 32      00                                 (four)           Medical



     gauge x                                         times           [facility]



     1/6" Ndle                                         daily.           

 

     Insulin            Yes       [id number]           USE AS           Univ

ers



     [place],      8                               DIRECTED 4           ity o

f



     Disposable,      00:00:                               TIMES A           [facility]

[facility]



     (BD INSULIN                                       DAY            Medical



     PEN NEEDLE                                         (E11.9)           [facility]



     UF) 31                                                        



     gauge x                                                        



     5/16" Ndle                                                        

 

     pen needle,            Yes       [id number] 1{each}      inject 1      

     [facility]



     diabetic      8-19                               Each under           ity o

f



     (NOVOFINE      00:00:                               the skin 4           [facility]

[facility]



     PLUS) 32      00                                 (four)           Medical



     gauge x                                         times           [facility]



     1/6" Ndle                                         daily.           

 

     Insulin            Yes       [id number]           USE AS           [facility]

ers



     [place],      8                               DIRECTED 4           ity o

f



     Disposable,      00:00:                               TIMES A           [facility]

[facility]



     (BD INSULIN                                       DAY            Medical



     PEN NEEDLE                                         (E11.9)           Branch



     UF) 31                                                        



     gauge x                                                        



     5/16" Ndle                                                        

 

     pen needle,            Yes       [id number] 1{each}      inject 1      

     [facility]



     diabetic      8-19                               Each under           ity o

f



     (NOVOFINE      00:00:                               the skin 4           [facility]

[facility]



     PLUS) 32      00                                 (four)           Medical



     gauge x                                         times           Branch



     1/6" Ndle                                         daily.           

 

     Insulin            Yes       [id number]           USE AS           [facility]

ers



     [place],                                     DIRECTED 4           ity o

f



     Disposable,      00:00:                               TIMES A           [facility]

[facility]



     (BD INSULIN                                       DAY            Medical



     PEN NEEDLE                                         (E11.9)           Branch



     UF) 31                                                        



     gauge x                                                        



     5/16" Ndle                                                        

 

     pen needle,            Yes       [id number] 1{each}      inject 1      

     [facility]



     diabetic      8-19                               Each under           ity o

f



     (NOVOFINE      00:00:                               the skin 4           [facility]

[facility]



     PLUS) 32      00                                 (four)           Medical



     gauge x                                         times           Branch



     1/6" Ndle                                         daily.           

 

     Insulin            Yes       [id number]           USE AS           [facility]

ers



     [place],                                     DIRECTED 4           ity o

f



     Disposable,      00:00:                               TIMES A           [facility]

[facility]



     (BD INSULIN                                       DAY            Medical



     PEN NEEDLE                                         (E11.9)           Branch



     UF) 31                                                        



     gauge x                                                        



     5/16" Ndle                                                        

 

     pen needle,            Yes       [id number] 1{each}      inject 1      

     [facility]



     diabetic      8-19                               Each under           ity o

f



     (NOVOFINE      00:00:                               the skin 4           [facility]

[facility]



     PLUS) 32      00                                 (four)           Medical



     gauge x                                         times           Branch



     1/6" Ndle                                         daily.           

 

     Insulin            Yes       [id number]           USE AS           [facility]

ers



     [place],                                     DIRECTED 4           ity o

f



     Disposable,      00:00:                               TIMES A           [facility]

[facility]



     (BD INSULIN                                       DAY            Medical



     PEN NEEDLE                                         (E11.9)           Branch



     UF) 31                                                        



     gauge x                                                        



     5/16" Ndle                                                        

 

     pen needle,            Yes       [id number] 1{each}      inject 1      

     [facility]



     diabetic      8-19                               Each under           ity o

f



     (NOVOFINE      00:00:                               the skin 4           [facility]

[facility]



     PLUS) 32      00                                 (four)           Medical



     gauge x                                         times           Branch



     1/6" Ndle                                         daily.           

 

     Insulin            Yes       [id number]           USE AS           [facility]

ers



     [place],                                     DIRECTED 4           ity o

f



     Disposable,      00:00:                               TIMES A           [facility]

[facility]



     (BD INSULIN      00                                 DAY            Medical



     PEN NEEDLE                                         (E11.9)           Branch



     UF) 31                                                        



     gauge x                                                        



     5/16" Ndle                                                        

 

     pen needle,            Yes       [id number] 1{each}      inject 1      

     [facility]



     diabetic      8-19                               Each under           ity o

f



     (NOVOFINE      00:00:                               the skin 4           [facility]

[facility]



     PLUS) 32      00                                 (four)           Medical



     gauge x                                         times           Branch



     1/6" Ndle                                         daily.           

 

     Insulin            Yes       [id number]           USE AS           [facility]

ers



     [place],                                     DIRECTED 4           ity o

f



     Disposable,      00:00:                               TIMES A           [facility]

[facility]



     (BD INSULIN                                       DAY            Medical



     PEN NEEDLE                                         (E11.9)           Branch



     UF) 31                                                        



     gauge x                                                        



     5/16" Ndle                                                        

 

     pen needle,            Yes       [id number] 1{each}      inject 1      

     [facility]



     diabetic      8-19                               Each under           ity o

f



     (NOVOFINE      00:00:                               the skin 4           [facility]

[facility]



     PLUS) 32      00                                 (four)           Medical



     gauge x                                         times           Branch



     1/6" Ndle                                         daily.           

 

     Insulin            Yes       [id number]           USE AS           [facility]

ers



     [place],                                     DIRECTED 4           ity o

f



     Disposable,      00:00:                               TIMES A           [facility]

[facility]



     (BD INSULIN                                       DAY            Medical



     PEN NEEDLE                                         (E11.9)           Branch



     UF) 31                                                        



     gauge x                                                        



     5/16" Ndle                                                        

 

     pen needle,            Yes       [id number] 1{each}      inject 1      

     [facility]



     diabetic      8-19                               Each under           ity o

f



     (NOVOFINE      00:00:                               the skin 4           [facility]

[facility]



     PLUS) 32      00                                 (four)           Medical



     gauge x                                         times           Branch



     1/6" Ndle                                         daily.           

 

     Insulin            Yes       [id number]           USE AS           [facility]

ers



     [place],                                     DIRECTED 4           ity o

f



     Disposable,      00:00:                               TIMES A           [facility]

[facility]



     (BD INSULIN                                       DAY            Medical



     PEN NEEDLE                                         (E11.9)           Branch



     UF) 31                                                        



     gauge x                                                        



     5/16" Ndle                                                        

 

     pen needle,            Yes       [id number] 1{each}      inject 1      

     [facility]



     diabetic      8-19                               Each under           ity o

f



     (NOVOFINE      00:00:                               the skin 4           [facility]

[facility]



     PLUS) 32      00                                 (four)           Medical



     gauge x                                         times           Branch



     1/6" Ndle                                         daily.           

 

     Insulin            Yes       [id number]           USE AS           [facility]

ers



     [place],                                     DIRECTED 4           ity o

f



     Disposable,      00:00:                               TIMES A           [facility]

[facility]



     (BD INSULIN      00                                 DAY            Medical



     PEN NEEDLE                                         (E11.9)           Branch



     UF) 31                                                        



     gauge x                                                        



     5/16" Ndle                                                        

 

     pen needle,            Yes       [id number] 1{each}      inject 1      

     [facility]



     diabetic      8-19                               Each under           ity o

f



     (NOVOFINE      00:00:                               the skin 4           [facility]

[facility]



     PLUS) 32      00                                 (four)           Medical



     gauge x                                         times           Branch



     1/6" Ndle                                         daily.           

 

     Insulin            Yes       [id number]           USE AS           [facility]

ers



     [place],                                     DIRECTED 4           ity o

f



     Disposable,      00:00:                               TIMES A           [facility]

[facility]



     (BD INSULIN      00                                 DAY            Medical



     PEN NEEDLE                                         (E11.9)           [facility]



     UF) 31                                                        



     gauge x                                                        



     5/16" Ndle                                                        

 

     pen needle,            Yes       [id number] 1{each}      inject 1      

     [facility]



     diabetic      8-19                               Each under           ity o

f



     (NOVOFINE      00:00:                               the skin 4           [facility]

[facility]



     PLUS) 32      00                                 (four)           Medical



     gauge x                                         times           Br[facility]



     1/6" Ndle                                         daily.           

 

     Insulin            Yes       [id number]           USE AS           [facility]

ers



     [place],                                     DIRECTED 4           ity o

f



     Disposable,      00:00:                               TIMES A           [facility]

[facility]



     (BD INSULIN                                       DAY            Medical



     PEN NEEDLE                                         (E11.9)           Branch



     UF) 31                                                        



     gauge x                                                        



     5/16" Ndle                                                        

 

     pen needle,            Yes       1961 1{each}      inject 1      

     [facility]



     diabetic      8-19                               Each under           ity o

f



     (NOVOFINE      00:00:                               the skin 4           [facility]

[facility]



     PLUS) 32      00                                 (four)           Medical



     gauge x                                         times           Branch



     1/6" Ndle                                         daily.           

 

     Insulin            Yes       [id number]           USE AS           [facility]

ers



     [place],                                     DIRECTED 4           ity o

f



     Disposable,      00:00:                               TIMES A           [facility]

[facility]



     (BD INSULIN                                       DAY            Medical



     PEN NEEDLE                                         (E11.9)           [facility]anch



     UF) 31                                                        



     gauge x                                                        



     5/16" Ndle                                                        

 

     pen needle,            Yes       [id number] 1{each}      inject 1      

     [facility]



     diabetic      8-19                               Each under           ity o

f



     (NOVOFINE      00:00:                               the skin 4           [facility]

[facility]



     PLUS) 32      00                                 (four)           Medical



     gauge x                                         times           Br[facility]



     1/6" Ndle                                         daily.           

 

     pen needle,            Yes       [id number] 1{each}      inject 1      

     [facility]



     diabetic      -                               Each under           ity o

f



     (NOVOFINE      00:00:                               the skin 4           [facility]

[facility]



     PLUS) 32      00                                 (four)           Medical



     gauge x                                         times           Br[facility]



     1/6" Ndle                                         daily.           

 

     Insulin      2023- No        [id number]           USE AS           [facility]

vers



     [place],      8-19 10-06                          DIRECTED 4           ity 

of



     Disposable,      00:00: 00:00                          TIMES A           [facility]

[facility]



     (BD INSULIN      00   :00                           DAY            Medical



     PEN NEEDLE                                         (E11.9)           [facility]anch



     UF) 31                                                        



     gauge x                                                        



     /16" Ndle                                                        

 

     insulin      2023- No        [id number]           INJECT 12           

[facility]



     aspart      24                          UNITS           ity of



     U-100      00:00: 00:00                          BEFORE           [place]



     (NOVOLOG      00   :00                           BREAKFAST,           Medic

al



     FLEXPEN                                         12 UNITS           Branch



     U-100                                         FOR LUNCH           



     INSULIN)                                         AND 12           



     100 unit/mL                                         UNITS FOR           



     (3 mL)                                         DINNER           



     injection                                                        

 

     flash      2023- No        [id number] 1{each}      Apply 1           U

nivers



     glucose                                Each to           [facility]



     sensor      00:00: 00:00                          skin every           [place]

[facility]



     (FREESTYLE      00   :00                           14             Medical



     VIMAL 2                                         (fourteen)           Branch



     SENSOR) Kit                                         days. Dx           



                                                  E11.42           

 

     insulin      2023- No        [id number] 46U       inject 46           

[facility]



     glargine                                Units           ity of



     U-300 conc      00:00: 00:00                          under the           [facility]

[place]



     (TOUJEO      00   :00                           skin           Medical



     SOLOSTAR                                         daily.           Branch



     U-300                                                        



     INSULIN)                                                        



     300 unit/mL                                                        



     (1.5 mL)                                                        



     InPn                                                        

 

     gabapentin      2023- No        [id number] 300mg      Take 1          

 [facility]



     300 mg                                capsule by           ity of



     capsule      00:00: 00:00                          mouth in           [place]



               00   :00                           the            Medical



                                                  morning           Branch



                                                  and 1           



                                                  capsule in           



                                                  the            



                                                  evening.           

 

     insulin      3- No        [id number]           INJECT 12           

[facility]



     aspart                                UNITS           ity of



     U-100      00:00: 00:00                          BEFORE           [place]



     (NOVOLOG      00   :00                           BREAKFAST,           Medic

al



     FLEXPEN                                         12 UNITS           [facility]



     U-100                                         FOR LUNCH           



     INSULIN)                                         AND 12           



     100 unit/mL                                         UNITS FOR           



     (3 mL)                                         DINNER           



     injection                                                        

 

     flash      2023- No        [id number] 1{each}      Apply 1           U

nivers



     glucose                                Each to           ity [facility]



     sensor      00:00: 00:00                          skin every           [place]

[facility]



     (FREESTYLE      00   :00                           14             Medical



     VIMAL 2                                         (fourteen)           Branch



     SENSOR) Kit                                         days. Dx           



                                                  E11.42           

 

     insulin      2023- No        [id number] 46U       inject 46           

[facility]



     glargine                                Units           ity of



     U-300 conc      00:00: 00:00                          under the           [facility]

[facility]



     (TOUJEO      00   :00                           skin           Medical



     SOLOSTAR                                         daily.           Branch



     U-300                                                        



     INSULIN)                                                        



     300 unit/mL                                                        



     (1.5 mL)                                                        



     InPn                                                        

 

     gabapentin      2023- No        [id number] 300mg      Take 1          

 [facility]



     300 mg                                capsule by           ity of



     capsule      00:00: 00:00                          mouth in           [place]



               00   :00                           the            Medical



                                                  morning           [facility]anch



                                                  and 1           



                                                  capsule in           



                                                  the            



                                                  evening.           

 

     lisinopriL      2023- No        [id number] 10mg      Take 2           U

nivers



     5 mg tablet                                tablets by           i

ty of



               00:00: 00:00                          mouth in           [place]



               00   :00                           the            Medical



                                                  morning.           Branch

 

     atorvastati      2023- No        [id number] 20mg      Take 1          

 [facility]



     n 20 mg                                tablet by           ity of



     tablet      00:00: 00:00                          mouth at           [place]



               00   :00                           bedtime.           Medical



                                                                 Branch

 

     dulaglutide      2022- No        [id number]           Inject 1.5      

     [facility]



     (TRULICITY)       11-                          mg weekly           it

y of



     1.5 mg/0.5      00:00: 00:00                                         [place]



     mL PnIj      00   :00                                          Medical



                                                                 Branch

 

     insulin      2022- No        [id number]           INJECT 12           

[facility]



     aspart      -                          UNITS           ity of



     U-100      00:00: 00:00                          BEFORE           [place]



     (NOVOLOG      00   :00                           BREAKFAST,           Medic

al



     FLEXPEN                                         12 UNITS           [facility]anch



     U-100                                         FOR LUNCH           



     INSULIN)                                         AND 12           



     100 unit/mL                                         UNITS FOR           



     (3 mL)                                         DINNER           



     injection                                                        

 

     dulaglutide      2022- No        [id number]           Inject 1.5      

     [facility]



     (TRULICITY)       08-19                          mg weekly           it

y of



     1.5 mg/0.5      00:00: 00:00                                         [place]



     mL PnIj      00   :00                                          Medical



                                                                 Branch

 

     insulin      2022- No        [id number]           INJECT 12           

[facility]



     aspart      8-14 08-19                          UNITS           ity of



     U-100      00:00: 00:00                          BEFORE           [place]



     (NOVOLOG      00   :00                           BREAKFAST,           Medic

al



     FLEXPEN                                         12 UNITS           Branch



     U-100                                         FOR LUNCH           



     INSULIN)                                         AND 12           



     100 unit/mL                                         UNITS FOR           



     (3 mL)                                         DINNER           



     injection                                                        

 

     dulaglutide      2022- No        [id number]           Inject 1.5      

     [facility]



     (TRULICITY)      8-14 08-19                          mg weekly           it

y of



     1.5 mg/0.5      00:00: 00:00                                         [place]



     mL PnIj      00   :00                                          Medical



                                                                 Branch

 

     Insulin      2022- No        [id number]           USE AS           Un[facility]

vers



     [place],                                DIRECTED 4           ity 

of



     Disposable,      00:00: 00:00                          TIMES A           [facility]

[facility]



     (BD INSULIN      00   :00                           DAY            Medical



     PEN NEEDLE                                         (E11.9)           Branch



     UF) 31                                                        



     gauge x                                                        



     5/16" Ndle                                                        

 

     Insulin      2022- No        [id number]           USE AS           [facility]

vers



     [place],                                DIRECTED 4           ity 

of



     Disposable,      00:00: 00:00                          TIMES A           [facility]

[facility]



     (BD INSULIN      00   :00                           DAY            Medical



     PEN NEEDLE                                         (E11.9)           Branch



     UF) 31                                                        



     gauge x                                                        



     5/16" Ndle                                                        

 

     LISINOPRIL      2022- No        [id number]           TAKE 2           U

nivers



     5 mg tablet                                TABLETS BY           i

ty of



               00:00: 00:00                          MOUTH           [place]



               00   :                           EVERY DAY           Medical



                                                                 Branch

 

     LISINOPRIL      2022- No        [id number]           TAKE 2           U

nivers



     5 mg tablet      -                          TABLETS BY           i

ty of



               00:00: 00:00                          MOUTH           [place]



               00   :                           EVERY DAY           Medical



                                                                 Branch

 

     ATORVASTATI      2022- No        [id number]           TAKE 1          

 [facility]



     N 20 mg      -                          TABLET BY           ity of



     tablet      00:00: 00:00                          MOUTH           [place]



               00   :00                           EVERYDAY           Medical



                                                  AT BEDTIME           Branch

 

     ATORVASTATI      2022- No        [id number]           TAKE 1          

 [facility]



     N 20 mg      -                          TABLET BY           ity of



     tablet      00:00: 00:00                          MOUTH           [place]



               00   :00                           EVERYDAY           Medical



                                                  AT BEDTIME           Branch

 

     flash            Yes            1{each}      1 Each           [facility]



     glucose      4-27                               SEE-INSTRU           ity of



     scanning      00:00:                               CTIONS.           [place]



     reader      00                                                Medical



     (FREESTYLE                                                        Branch



     VIMAL 2                                                        



     READER)                                                        



     Misc                                                        

 

     flash      202-0      Yes            1{each}      1 Each           [facility]



     glucose      4-27                               SEE-INSTRU           ity of



     scanning      00:00:                               CTIONS.           [place]



     reader      00                                                Medical



     (FREESTYLE                                                        Branch



     VIMAL 2                                                        



     READER)                                                        



     Misc                                                        

 

     flash      202-0      Yes            1{each}      1 Each           [facility]



     glucose      4-27                               SEE-INSTRU           ity of



     scanning      00:00:                               CTIONS.           [place]



     reader      00                                                Medical



     (FREESTYLE                                                        Branch



     VIMAL 2                                                        



     READER)                                                        



     Misc                                                        

 

     flash      202-0      Yes            1{each}      1 Each           [facility]



     glucose      4-27                               SEE-INSTRU           ity of



     scanning      00:00:                               CTIONS.           [place]



     reader      00                                                Medical



     (FREESTYLE                                                        Branch



     VIMAL 2                                                        



     READER)                                                        



     Misc                                                        

 

     flash      202-0      Yes            1{each}      1 Each           [facility]



     glucose      4-27                               SEE-INSTRU           ity of



     scanning      00:00:                               CTIONS.           [place]



     reader      00                                                Medical



     (FREESTYLE                                                        Branch



     VIMAL 2                                                        



     READER)                                                        



     Misc                                                        

 

     flash      -0      Yes            1{each}      1 Each           [facility]



     glucose      4-27                               SEE-INSTRU           ity of



     scanning      00:00:                               CTIONS.           [place]



     reader      00                                                Medical



     (FREESTYLE                                                        Branch



     VIMAL 2                                                        



     READER)                                                        



     Misc                                                        

 

     flash      202-0      Yes            1{each}      1 Each           [facility]



     glucose      4-27                               SEE-INSTRU           ity of



     scanning      00:00:                               CTIONS.           [place]



     reader      00                                                Medical



     (FREESTYLE                                                        Branch



     VIMAL 2                                                        



     READER)                                                        



     Misc                                                        

 

     flash      2022-0      Yes            1{each}      1 Each           [facility]



     glucose      4-27                               SEE-INSTRU           ity of



     scanning      00:00:                               CTIONS.           [place]



     reader      00                                                Medical



     (FREESTYLE                                                        Branch



     VIMAL 2                                                        



     READER)                                                        



     Misc                                                        

 

     flash      -0      Yes            1{each}      1 Each           [facility]



     glucose      4-27                               SEE-INSTRU           ity of



     scanning      00:00:                               CTIONS.           [place]



     reader      00                                                Medical



     (FREESTYLE                                                        Branch



     VIMAL 2                                                        



     READER)                                                        



     Misc                                                        

 

     flash      2022-0      Yes            1{each}      1 Each           [facility]



     glucose      4-27                               SEE-INSTRU           ity of



     scanning      00:00:                               CTIONS.           [place]



     reader      00                                                Medical



     (FREESTYLE                                                        Branch



     VIMAL 2                                                        



     READER)                                                        



     Misc                                                        

 

     flash      2022-0      Yes            1{each}      1 Each           [facility]



     glucose      4-27                               SEE-INSTRU           ity of



     scanning      00:00:                               CTIONS.           [place]



     reader      00                                                Medical



     (FREESTYLE                                                        Branch



     VIMAL 2                                                        



     READER)                                                        



     Misc                                                        

 

     flash      -0      Yes            1{each}      1 Each           [facility]



     glucose      4-27                               SEE-INSTRU           ity of



     scanning      00:00:                               CTIONS.           [place]



     reader      00                                                Medical



     (FREESTYLE                                                        Branch



     VIMAL 2                                                        



     READER)                                                        



     Misc                                                        

 

     flash      -0      Yes            1{each}      1 Each           [facility]



     glucose      4-27                               SEE-INSTRU           ity of



     scanning      00:00:                               CTIONS.           [place]



     reader      00                                                Medical



     (FREESTYLE                                                        Branch



     VIMAL 2                                                        



     READER)                                                        



     Misc                                                        

 

     flash      202-0      Yes            1{each}      1 Each           [facility]



     glucose      4-27                               SEE-INSTRU           ity of



     scanning      00:00:                               CTIONS.           [place]



     reader      00                                                Medical



     (FREESTYLE                                                        Branch



     VIMAL 2                                                        



     READER)                                                        



     Misc                                                        

 

     flash      2022-0      Yes            1{each}      1 Each           [facility]



     glucose      4-27                               SEE-INSTRU           ity of



     scanning      00:00:                               CTIONS.           [place]



     reader      00                                                Medical



     (FREESTYLE                                                        Branch



     VIMAL 2                                                        



     READER)                                                        



     Misc                                                        

 

     flash      2022-0      Yes            1{each}      1 Each           [facility]



     glucose      4-27                               SEE-INSTRU           ity of



     scanning      00:00:                               CTIONS.           [place]



     reader      00                                                Medical



     (FREESTYLE                                                        Branch



     VIMAL 2                                                        



     READER)                                                        



     Misc                                                        

 

     flash      -0      Yes            1{each}      1 Each           [facility]



     glucose      4-27                               SEE-INSTRU           ity of



     scanning      00:00:                               CTIONS.           [place]



     reader      00                                                Medical



     (FREESTYLE                                                        Branch



     VIMAL 2                                                        



     READER)                                                        



     Misc                                                        

 

     flash      2022-0      Yes            1{each}      1 Each           [facility]



     glucose      4-27                               SEE-INSTRU           ity of



     scanning      00:00:                               CTIONS.           [place]



     reader      00                                                Medical



     (FREESTYLE                                                        Branch



     VIMAL 2                                                        



     READER)                                                        



     Misc                                                        

 

     flash      2022-0      Yes            1{each}      1 Each           [facility]



     glucose      4-27                               SEE-INSTRU           ity of



     scanning      00:00:                               CTIONS.           [place]



     reader      00                                                Medical



     (FREESTYLE                                                        Branch



     VIMAL 2                                                        



     READER)                                                        



     Misc                                                        

 

     flash      2022-0      Yes            1{each}      1 Each           [facility]



     glucose      4-27                               SEE-INSTRU           ity of



     scanning      00:00:                               CTIONS.           [place]



     reader      00                                                Medical



     (FREESTYLE                                                        Branch



     VIMAL 2                                                        



     READER)                                                        



     Misc                                                        

 

     flash      2022-0      Yes            1{each}      1 Each           [facility]



     glucose      4-27                               SEE-INSTRU           ity of



     scanning      00:00:                               CTIONS.           [place]



     reader      00                                                Medical



     (FREESTYLE                                                        Branch



     VIMAL 2                                                        



     READER)                                                        



     Misc                                                        

 

     flash      2022-0      Yes            1{each}      1 Each           [facility]



     glucose      4-27                               SEE-INSTRU           ity of



     scanning      00:00:                               CTIONS.           [place]



     reader      00                                                Medical



     (FREESTYLE                                                        Branch



     VIMAL 2                                                        



     READER)                                                        



     Misc                                                        

 

     flash      -0      Yes            1{each}      1 Each           [facility]



     glucose      4-27                               SEE-INSTRU           ity of



     scanning      00:00:                               CTIONS.           [place]



     reader      00                                                Medical



     (FREESTYLE                                                        Branch



     VIMAL 2                                                        



     READER)                                                        



     Misc                                                        

 

     flash      2022-0      Yes            1{each}      1 Each           [facility]



     glucose      4-27                               SEE-INSTRU           ity of



     scanning      00:00:                               CTIONS.           [place]



     reader      00                                                Medical



     (FREESTYLE                                                        Branch



     VIMAL 2                                                        



     READER)                                                        



     Misc                                                        

 

     flash      2022-0      Yes            1{each}      1 Each           [facility]



     glucose      4-27                               SEE-INSTRU           ity of



     scanning      00:00:                               CTIONS.           [place]



     reader      00                                                Medical



     (FREESTYLE                                                        Branch



     VIMAL 2                                                        



     READER)                                                        



     Misc                                                        

 

     flash      202-0      Yes            1{each}      1 Each           [facility]



     glucose      4-27                               SEE-INSTRU           ity of



     scanning      00:00:                               CTIONS.           [place]



     reader      00                                                Medical



     (FREESTYLE                                                        Branch



     VIMAL 2                                                        



     READER)                                                        



     Misc                                                        

 

     flash      -0      Yes            1{each}      1 Each           [facility]



     glucose      4-27                               SEE-INSTRU           ity of



     scanning      00:00:                               CTIONS.           [place]



     reader      00                                                Medical



     (FREESTYLE                                                        Branch



     VIMAL 2                                                        



     READER)                                                        



     Misc                                                        

 

     flash      202-0      Yes            1{each}      1 Each           [facility]



     glucose      4-27                               SEE-INSTRU           ity of



     scanning      00:00:                               CTIONS.           [place]



     reader      00                                                Medical



     (FREESTYLE                                                        Branch



     VIMAL 2                                                        



     READER)                                                        



     Misc                                                        

 

     flash      2022-0      Yes            1{each}      1 Each           [facility]



     glucose      4-27                               SEE-INSTRU           ity of



     scanning      00:00:                               CTIONS.           [place]



     reader      00                                                Medical



     (FREESTYLE                                                        Branch



     VIMAL 2                                                        



     READER)                                                        



     Misc                                                        

 

     flash      2022-0      Yes            1{each}      1 Each           [facility]



     glucose      4-27                               SEE-INSTRU           ity of



     scanning      00:00:                               CTIONS.           [place]



     reader      00                                                Medical



     (FREESTYLE                                                        Branch



     VIMAL 2                                                        



     READER)                                                        



     Misc                                                        

 

     flash      2022-0      Yes            1{each}      1 Each           [facility]



     glucose      4-27                               SEE-INSTRU           ity of



     scanning      00:00:                               CTIONS.           [place]



     reader      00                                                Medical



     (FREESTYLE                                                        Branch



     VIMAL 2                                                        



     READER)                                                        



     Misc                                                        

 

     flash      2022-0      Yes            1{each}      1 Each           [facility]



     glucose      4-27                               SEE-INSTRU           ity of



     scanning      00:00:                               CTIONS.           [place]



     reader      00                                                Medical



     (FREESTYLE                                                        Branch



     VIMAL 2                                                        



     READER)                                                        



     Misc                                                        

 

     flash      -0      Yes            1{each}      1 Each           [facility]



     glucose      4-27                               SEE-INSTRU           ity of



     scanning      00:00:                               CTIONS.           [place]



     reader      00                                                Medical



     (FREESTYLE                                                        Branch



     VIMAL 2                                                        



     READER)                                                        



     Misc                                                        

 

     flash      -0      Yes            1{each}      1 Each           [facility]



     glucose      4-27                               SEE-INSTRU           ity of



     scanning      00:00:                               CTIONS.           [place]



     reader      00                                                Medical



     (FREESTYLE                                                        Branch



     VIMAL 2                                                        



     READER)                                                        



     Misc                                                        

 

     flash      -0      Yes            1{each}      1 Each           [facility]



     glucose      4-27                               SEE-INSTRU           ity of



     scanning      00:00:                               CTIONS.           [place]



     reader      00                                                Medical



     (FREESTYLE                                                        Branch



     VIMAL 2                                                        



     READER)                                                        



     Misc                                                        

 

     flash      -0      Yes            1{each}      1 Each           [facility]



     glucose      4-27                               SEE-INSTRU           ity of



     scanning      00:00:                               CTIONS.           [place]



     reader      00                                                Medical



     (FREESTYLE                                                        Branch



     VIMAL 2                                                        



     READER)                                                        



     Misc                                                        

 

     flash      -0      Yes            1{each}      1 Each           [facility]



     glucose      4-27                               SEE-INSTRU           ity of



     scanning      00:00:                               CTIONS.           [place]



     reader      00                                                Medical



     (FREESTYLE                                                        Branch



     VIMAL 2                                                        



     READER)                                                        



     Misc                                                        

 

     flash      -0      Yes            1{each}      1 Each           [facility]



     glucose      4-27                               SEE-INSTRU           ity of



     scanning      00:00:                               CTIONS.           [place]



     reader      00                                                Medical



     (FREESTYLE                                                        Branch



     VIMAL 2                                                        



     READER)                                                        



     Misc                                                        

 

     flash      -0      Yes            1{each}      1 Each           [facility]



     glucose      4-27                               SEE-INSTRU           ity of



     scanning      00:00:                               CTIONS.           [place]



     reader      00                                                Medical



     (FREESTYLE                                                        Branch



     VIMAL 2                                                        



     READER)                                                        



     Misc                                                        

 

     flash      -0      Yes            1{each}      1 Each           [facility]



     glucose      4-27                               SEE-INSTRU           ity of



     scanning      00:00:                               CTIONS.           [place]



     reader      00                                                Medical



     (FREESTYLE                                                        Branch



     VIMAL 2                                                        



     READER)                                                        



     Misc                                                        

 

     flash      -0      Yes            1{each}      1 Each           [facility]



     glucose      4-27                               SEE-INSTRU           ity of



     scanning      00:00:                               CTIONS.           [place]



     reader      00                                                Medical



     (FREESTYLE                                                        Branch



     VIMAL 2                                                        



     READER)                                                        



     Misc                                                        

 

     insulin      -0 2022- No        [id number] 46U       inject 46           

[facility]



     glargine      4-22 08-19                          Units           ity of



     U-300 conc      00:00: 00:00                          under the           [facility]

[facility]



     (TOUJEO      00   :00                           skin           Medical



     SOLOSTAR                                         daily.           Branch



     U-300                                                        



     INSULIN)                                                        



     300 unit/mL                                                        



     (1.5 mL)                                                        



     InPn                                                        

 

     flash      2022- No        [id number] 1{each}      Apply 1           [facility]

[facility]



     glucose      -                          Each to           [facility] of



     sensor      00:00: 00:00                          skin every           [place]

[facility]



     (FREESTYLE      00   :00                           14             Medical



     VIMAL 2                                         (fourteen)           Branch



     SENSOR) Kit                                         days. Dx           



                                                  E11.42           

 

     insulin      2022- No        [id number] 46U       inject 46           

[facility]



     glargine      -                          Units           ity of



     U-300 conc      00:00: 00:00                          under the           [facility]

[facility]



     (TOUJEO      00   :00                           skin           Medical



     SOLOSTAR                                         daily.           Branch



     U-300                                                        



     INSULIN)                                                        



     300 unit/mL                                                        



     (1.5 mL)                                                        



     InPn                                                        

 

     flash      2022- No        [id number] 1{each}      Apply 1           [facility]

[facility]



     glucose                                Each to           [facility]



     sensor      00:00: 00:00                          skin every           [place]

[facility]



     (FREESTYLE      00   :00                           14             Medical



     VIMAL 2                                         (fourteen)           Branch



     SENSOR) Kit                                         days. Dx           



                                                  E11.42           

 

     tamsulosin            Yes       [id number] .4mg      Take 1         

  [facility]



     (FLOMAX)      2-23                9102           capsule by           ity o

f



     0.4 mg 24      00:00:                               mouth           [place]



     hr capsule      00                                 daily.           Medical



                                                                 Branch

 

     tamsulosin            Yes       [id number] .4mg      Take 1         

  [facility]



     (FLOMAX)      2-23                9102           capsule by           ity o

f



     0.4 mg 24      00:00:                               mouth           [place]



     hr capsule      00                                 daily.           Medical



                                                                 Branch

 

     tamsulosin      0      Yes       [id number] .4mg      Take 1         

  [facility]



     (FLOMAX)      2-23                9102           capsule by           ity o

f



     0.4 mg 24      00:00:                               mouth           [place]



     hr capsule      00                                 daily.           Medical



                                                                 Branch

 

     tamsulosin      0      Yes       [id number] .4mg      Take 1         

  [facility]



     (FLOMAX)      2-23                9102           capsule by           ity o

f



     0.4 mg 24      00:00:                               mouth           [place]



     hr capsule      00                                 daily.           Medical



                                                                 Branch

 

     tamsulosin      0      Yes       [id number] .4mg      Take 1         

  [facility]



     (FLOMAX)      2-23                9102           capsule by           ity o

f



     0.4 mg 24      00:00:                               mouth           [place]



     hr capsule      00                                 daily.           Medical



                                                                 Branch

 

     tamsulosin      0      Yes       [id number] .4mg      Take 1         

  [facility]



     (FLOMAX)      2-23                9102           capsule by           ity o

f



     0.4 mg 24      00:00:                               mouth           [place]



     hr capsule      00                                 daily.           Medical



                                                                 Branch

 

     tamsulosin      0      Yes       [id number] .4mg      Take 1         

  [facility]



     (FLOMAX)      2-23                9102           capsule by           ity o

f



     0.4 mg 24      00:00:                               mouth           [place]



     hr capsule      00                                 daily.           Medical



                                                                 Branch

 

     tamsulosin      0      Yes       [id number] .4mg      Take 1         

  [facility]



     (FLOMAX)      2-23                9102           capsule by           ity o

f



     0.4 mg 24      00:00:                               mouth           [place]



     hr capsule      00                                 daily.           Medical



                                                                 Branch

 

     tamsulosin      0      Yes       [id number] .4mg      Take 1         

  [facility]



     (FLOMAX)      2-23                9102           capsule by           ity o

f



     0.4 mg 24      00:00:                               mouth           [place]



     hr capsule      00                                 daily.           Medical



                                                                 Branch

 

     tamsulosin      0      Yes       [id number] .4mg      Take 1         

  [facility]



     (FLOMAX)      2-23                9102           capsule by           ity o

f



     0.4 mg 24      00:00:                               mouth           [place]



     hr capsule      00                                 daily.           Medical



                                                                 Branch

 

     tamsulosin      0      Yes       [id number] .4mg      Take 1         

  [facility]



     (FLOMAX)      2-23                9102           capsule by           ity o

f



     0.4 mg 24      00:00:                               mouth           [place]



     hr capsule      00                                 daily.           Medical



                                                                 Branch

 

     tamsulosin      0      Yes       [id number] .4mg      Take 1         

  [facility]



     (FLOMAX)      2-23                9102           capsule by           ity o

f



     0.4 mg 24      00:00:                               mouth           [place]



     hr capsule      00                                 daily.           Medical



                                                                 Branch

 

     tamsulosin      0      Yes       [id number] .4mg      Take 1         

  [facility]



     (FLOMAX)      2-23                9102           capsule by           ity o

f



     0.4 mg 24      00:00:                               mouth           [place]



     hr capsule      00                                 daily.           Medical



                                                                 Branch

 

     tamsulosin      0      Yes       [id number] .4mg      Take 1         

  [facility]



     (FLOMAX)      2-23                9102           capsule by           ity o

f



     0.4 mg 24      00:00:                               mouth           [place]



     hr capsule      00                                 daily.           Medical



                                                                 Branch

 

     tamsulosin      -0      Yes       [id number] .4mg      Take 1         

  [facility]



     (FLOMAX)      2-23                9102           capsule by           ity o

f



     0.4 mg 24      00:00:                               mouth           [place]



     hr capsule      00                                 daily.           Medical



                                                                 Branch

 

     tamsulosin      0      Yes       [id number] .4mg      Take 1         

  [facility]



     (FLOMAX)      2-23                9102           capsule by           ity o

f



     0.4 mg 24      00:00:                               mouth           [place]



     hr capsule      00                                 daily.           Medical



                                                                 Branch

 

     tamsulosin      0      Yes       [id number] .4mg      Take 1         

  [facility]



     (FLOMAX)      2-23                9102           capsule by           ity o

f



     0.4 mg 24      00:00:                               mouth           [place]



     hr capsule      00                                 daily.           Medical



                                                                 Branch

 

     tamsulosin      0      Yes       [id number] .4mg      Take 1         

  [facility]



     (FLOMAX)      2-23                9102           capsule by           ity o

f



     0.4 mg 24      00:00:                               mouth           [place]



     hr capsule      00                                 daily.           Medical



                                                                 Branch

 

     tamsulosin      0      Yes       [id number] .4mg      Take 1         

  [facility]



     (FLOMAX)      2-23                9102           capsule by           ity o

f



     0.4 mg 24      00:00:                               mouth           [place]



     hr capsule      00                                 daily.           Medical



                                                                 Branch

 

     tamsulosin            Yes       [id number] .4mg      Take 1         

  [facility]



     (FLOMAX)      2-23                9102           capsule by           ity o

f



     0.4 mg 24      00:00:                               mouth           [place]



     hr capsule      00                                 daily.           Medical



                                                                 Branch

 

     tamsulosin      0      Yes       [id number] .4mg      Take 1         

  [facility]



     (FLOMAX)      2-23                9102           capsule by           ity o

f



     0.4 mg 24      00:00:                               mouth           [place]



     hr capsule      00                                 daily.           Medical



                                                                 Branch

 

     tamsulosin      0      Yes       [id number] .4mg      Take 1         

  [facility]



     (FLOMAX)      2-23                9102           capsule by           ity o

f



     0.4 mg 24      00:00:                               mouth           [place]



     hr capsule      00                                 daily.           Medical



                                                                 Branch

 

     tamsulosin      0      Yes       [id number] .4mg      Take 1         

  [facility]



     (FLOMAX)      2-23                9102           capsule by           ity o

f



     0.4 mg 24      00:00:                               mouth           [place]



     hr capsule      00                                 daily.           Medical



                                                                 Branch

 

     tamsulosin      0      Yes       [id number] .4mg      Take 1         

  [facility]



     (FLOMAX)      2-23                9102           capsule by           ity o

f



     0.4 mg 24      00:00:                               mouth           [place]



     hr capsule      00                                 daily.           Medical



                                                                 Branch

 

     tamsulosin      0      Yes       [id number] .4mg      Take 1         

  [facility]



     (FLOMAX)      2-23                9102           capsule by           ity o

f



     0.4 mg 24      00:00:                               mouth           [place]



     hr capsule      00                                 daily.           Medical



                                                                 Branch

 

     tamsulosin            Yes       [id number] .4mg      Take 1         

  [facility]



     (FLOMAX)      2-23                9102           capsule by           ity o

f



     0.4 mg 24      00:00:                               mouth           [place]



     hr capsule      00                                 daily.           Medical



                                                                 Branch

 

     tamsulosin            Yes       [id number] .4mg      Take 1         

  [facility]



     (FLOMAX)      2-23                9102           capsule by           ity o

f



     0.4 mg 24      00:00:                               mouth           [place]



     hr capsule      00                                 daily.           Medical



                                                                 Branch

 

     tamsulosin            Yes       [id number] .4mg      Take 1         

  [facility]



     (FLOMAX)      2-23                9102           capsule by           ity o

f



     0.4 mg 24      00:00:                               mouth           [place]



     hr capsule      00                                 daily.           Medical



                                                                 Branch

 

     tamsulosin            Yes       [id number] .4mg      Take 1         

  [facility]



     (FLOMAX)      2-23                9102           capsule by           ity o

f



     0.4 mg 24      00:00:                               mouth           [place]



     hr capsule      00                                 daily.           Medical



                                                                 Branch

 

     tamsulosin            Yes       [id number] .4mg      Take 1         

  [facility]



     (FLOMAX)      2-23                9102           capsule by           ity o

f



     0.4 mg 24      00:00:                               mouth           [place]



     hr capsule      00                                 daily.           Medical



                                                                 Branch

 

     tamsulosin            Yes       [id number] .4mg      Take 1         

  [facility]



     (FLOMAX)      2-23                9102           capsule by           ity o

f



     0.4 mg 24      00:00:                               mouth           [place]



     hr capsule      00                                 daily.           Medical



                                                                 Branch

 

     tamsulosin            Yes       [id number] .4mg      Take 1         

  [facility]



     (FLOMAX)      2-23                9102           capsule by           ity o

f



     0.4 mg 24      00:00:                               mouth           [place]



     hr capsule      00                                 daily.           Medical



                                                                 Branch

 

     tamsulosin            Yes       [id number] .4mg      Take 1         

  [facility]



     (FLOMAX)      2-23                9102           capsule by           ity o

f



     0.4 mg 24      00:00:                               mouth           [place]



     hr capsule      00                                 daily.           Medical



                                                                 Branch

 

     tamsulosin            Yes       [id number] .4mg      Take 1         

  [facility]



     (FLOMAX)      2-23                9102           capsule by           ity o

f



     0.4 mg 24      00:00:                               mouth           [place]



     hr capsule      00                                 daily.           Medical



                                                                 Branch

 

     tamsulosin      2022-0      Yes       [id number] .4mg      Take 1         

  [facility]



     (FLOMAX)      2-23                9102           capsule by           ity o

f



     0.4 mg 24      00:00:                               mouth           [place]



     hr capsule      00                                 daily.           Medical



                                                                 Branch

 

     tamsulosin            Yes       [id number] .4mg      Take 1         

  [facility]



     (FLOMAX)      2-23                9102           capsule by           ity o

f



     0.4 mg 24      00:00:                               mouth           [place]



     hr capsule      00                                 daily.           Medical



                                                                 Branch

 

     tamsulosin            Yes       [id number] .4mg      Take 1         

  [facility]



     (FLOMAX)      2-23                9102           capsule by           ity o

f



     0.4 mg 24      00:00:                               mouth           [place]



     hr capsule      00                                 daily.           Medical



                                                                 Branch

 

     tamsulosin            Yes       [id number] .4mg      Take 1         

  [facility]



     (FLOMAX)      2-23                9102           capsule by           ity o

f



     0.4 mg 24      00:00:                               mouth           [place]



     hr capsule      00                                 daily.           Medical



                                                                 Branch

 

     tamsulosin            Yes       [id number] .4mg      Take 1         

  [facility]



     (FLOMAX)      2-23                9102           capsule by           ity o

f



     0.4 mg 24      00:00:                               mouth           [place]



     hr capsule      00                                 daily.           Medical



                                                                 Branch

 

     tamsulosin            Yes       [id number] .4mg      Take 1         

  [facility]



     (FLOMAX)      2-23                9102           capsule by           ity o

f



     0.4 mg 24      00:00:                               mouth           [place]



     hr capsule      00                                 daily.           Medical



                                                                 Branch

 

     tamsulosin            Yes       [id number] .4mg      Take 1         

  [facility]



     (FLOMAX)      2-23                9102           capsule by           ity o

f



     0.4 mg 24      00:00:                               mouth           [place]



     hr capsule      00                                 daily.           [facility]



                                                                 Branch

 

     tamsulosin            Yes       [id number] .4mg      Take 1         

  [facility]



     (FLOMAX)      2-23                9102           capsule by           ity o

f



     0.4 mg 24      00:00:                               mouth           [place]



     hr capsule      00                                 daily.           Medical



                                                                 Branch

 

     pen needle,      2021- No        [id number] 1{each}      inject 1     

      [facility]



     diabetic                                Each under           ity 

of



     (NOVOFINE      00:00: 00:00                          the skin 4           [facility]

[place]



     PLUS) 32      00   :00                           (four)           Medical



     gauge x                                         times           Branch



     " Ndle                                         daily.           

 

     pen needle,      2021- No        [id number] 1{each}      inject 1     

      [facility]



     diabetic      1-01 08-19                          Each under           ity 

of



     (NOVOFINE      00:00: 00:00                          the skin 4           [facility]

[facility]



     PLUS) 32      00   :00                           (four)           Medical



     gauge x                                         times           [facility]



     /6" Ndle                                         daily.           

 

     flash      2020-0      Yes       [id number] 1{each}      1 Each           [facility]

[facility]



     glucose      6-12                               before           ity of



     scanning      00:00:                               meals and           [facility]

[facility]



     reader      00                                 at             Medical



     (FREESTYLE                                         bedtime.           [facility]

h



     VIMAL 14                                                        



     DAY READER)                                                        



     Misc                                                        

 

     flash      2020-0      Yes       [id number] 1{each}      1 Each           [facility]

[facility]



     glucose      6-12                               before           ity of



     scanning      00:00:                               meals and           [facility]

[facility]



     reader      00                                 at             Medical



     (FREESTYLE                                         bedtime.           [facility]

h



     VIMAL 14                                                        



     DAY READER)                                                        



     Misc                                                        

 

     flash      2020-0      Yes       [id number] 1{each}      1 Each           [facility]

[facility]



     glucose      6-12                               before           ity of



     scanning      00:00:                               meals and           [facility]

s



     reader      00                                 at             [facility]



     (FREESTYLE                                         bedtime.           [facility]

h



     VIMAL 14                                                        



     DAY READER)                                                        



     Misc                                                        

 

     flash      2020-0      Yes       [id number] 1{each}      1 Each           [facility]

[facility]



     glucose      6-12                               before           ity of



     scanning      00:00:                               meals and           [facility]

[facility]



     reader      00                                 at             [facility]



     (FREESTYLE                                         bedtime.           [facility]

h



     VIMAL 14                                                        



     DAY READER)                                                        



     Misc                                                        

 

     flash      2020-0      Yes       [id number] 1{each}      1 Each           [facility]

vers



     glucose      6-12                               before           ity of



     scanning      00:00:                               meals and           [facility]

s



     reader      00                                 at             Medical



     (FREESTYLE                                         bedtime.           [facility]

h



     VIMAL 14                                                        



     DAY READER)                                                        



     Misc                                                        

 

     flash      2020-0      Yes       [id number] 1{each}      1 Each           [facility]

[facility]



     glucose      6-12                               before           ity of



     scanning      00:00:                               meals and           [facility]

s



     reader      00                                 at             Medical



     (FREESTYLE                                         bedtime.           [facility]

h



     VIMAL 14                                                        



     DAY READER)                                                        



     Misc                                                        

 

     flash      2020-0      Yes       [id number] 1{each}      1 Each           [facility]

vers



     glucose      6-12                               before           ity of



     scanning      00:00:                               meals and           [facility]

s



     reader      00                                 at             Medical



     (FREESTYLE                                         bedtime.           [facility]

h



     VIMAL 14                                                        



     DAY READER)                                                        



     Misc                                                        

 

     flash      2020-0      Yes       [id number] 1{each}      1 Each           [facility]

vers



     glucose      6-12                               before           ity of



     scanning      00:00:                               meals and           [facility]

[facility]



     reader      00                                 at             Medical



     (FREESTYLE                                         bedtime.           [facility]

h



     VIMAL 14                                                        



     DAY READER)                                                        



     Misc                                                        

 

     flash      2020-0      Yes       [id number] 1{each}      1 Each           [facility]

vers



     glucose      6-12                               before           ity of



     scanning      00:00:                               meals and           [facility]

[facility]



     reader      00                                 at             Medical



     (FREESTYLE                                         bedtime.           [facility]

h



     VIMAL 14                                                        



     DAY READER)                                                        



     Misc                                                        

 

     flash      2020-0      Yes       [id number] 1{each}      1 Each           [facility]

vers



     glucose      6-12                               before           ity of



     scanning      00:00:                               meals and           [facility]

[facility]



     reader      00                                 at             Medical



     (FREESTYLE                                         bedtime.           [facility]

h



     VIMAL 14                                                        



     DAY READER)                                                        



     Misc                                                        

 

     flash      2020-0      Yes       [id number] 1{each}      1 Each           [facility]

vers



     glucose      6-12                               before           ity of



     scanning      00:00:                               meals and           [facility]

[facility]



     reader      00                                 at             Medical



     (FREESTYLE                                         bedtime.           [facility]

h



     VIMAL 14                                                        



     DAY READER)                                                        



     Misc                                                        

 

     flash      2020-0      Yes       [id number] 1{each}      1 Each           [facility]

vers



     glucose      6-12                               before           ity of



     scanning      00:00:                               meals and           [facility]

[facility]



     reader      00                                 at             [facility]



     (FREESTYLE                                         bedtime.           [facility]

h



     VIMAL 14                                                        



     DAY READER)                                                        



     Misc                                                        

 

     flash      2020-0      Yes       [id number] 1{each}      1 Each           [facility]

vers



     glucose      6-12                               before           ity of



     scanning      00:00:                               meals and           [facility]

[facility]



     reader      00                                 at             [facility]



     (FREESTYLE                                         bedtime.           [facility]

h



     VIMAL 14                                                        



     DAY READER)                                                        



     Misc                                                        

 

     flash      2020-0      Yes       [id number] 1{each}      1 Each           [facility]

vers



     glucose      6-12                               before           ity of



     scanning      00:00:                               meals and           [facility]

s



     reader      00                                 at             Medical



     (FREESTYLE                                         bedtime.           [facility]

h



     VIMAL 14                                                        



     DAY READER)                                                        



     Misc                                                        

 

     flash      2020-0      Yes       [id number] 1{each}      1 Each           [facility]

vers



     glucose      6-12                               before           ity of



     scanning      00:00:                               meals and           [facility]

s



     reader      00                                 at             Medical



     (FREESTYLE                                         bedtime.           [facility]

h



     VIMAL 14                                                        



     DAY READER)                                                        



     Misc                                                        

 

     flash      2020-0      Yes       [id number] 1{each}      1 Each           [facility]

vers



     glucose      6-12                               before           ity of



     scanning      00:00:                               meals and           [facility]

s



     reader      00                                 at             Medical



     (FREESTYLE                                         bedtime.           [facility]

h



     VIMAL 14                                                        



     DAY READER)                                                        



     Misc                                                        

 

     flash      2020-0      Yes       [id number] 1{each}      1 Each           [facility]

vers



     glucose      6-12                               before           ity of



     scanning      00:00:                               meals and           [facility]

s



     reader      00                                 at             [facility]



     (FREESTYLE                                         bedtime.           [facility]

h



     VIMAL 14                                                        



     DAY READER)                                                        



     Misc                                                        

 

     flash      2020-0      Yes       [id number] 1{each}      1 Each           [facility]

vers



     glucose      6-12                               before           ity of



     scanning      00:00:                               meals and           [facility]

s



     reader      00                                 at             Medical



     (FREESTYLE                                         bedtime.           [facility]

h



     VIMAL 14                                                        



     DAY READER)                                                        



     Misc                                                        

 

     flash      2020-0      Yes       [id number] 1{each}      1 Each           [facility]

vers



     glucose      6-12                               before           ity of



     scanning      00:00:                               meals and           [facility]

s



     reader      00                                 at             Medical



     (FREESTYLE                                         bedtime.           [facility]

h



     VIMAL 14                                                        



     DAY READER)                                                        



     Misc                                                        

 

     flash      2020-0      Yes       [id number] 1{each}      1 Each           [facility]

vers



     glucose      6-12                               before           ity of



     scanning      00:00:                               meals and           [facility]

[facility]



     reader      00                                 at             [facility]



     (FREESTYLE                                         bedtime.           [facility]

h



     VIMAL 14                                                        



     DAY READER)                                                        



     Misc                                                        

 

     flash      2020-0      Yes       [id number] 1{each}      1 Each           [facility]

vers



     glucose      6-12                               before           ity of



     scanning      00:00:                               meals and           [facility]

[facility]



     reader      00                                 at             [facility]



     (FREESTYLE                                         bedtime.           [facility]

h



     VIMAL 14                                                        



     DAY READER)                                                        



     Misc                                                        

 

     flash      2020-0      Yes       [id number] 1{each}      1 Each           [facility]

vers



     glucose      6-12                               before           ity of



     scanning      00:00:                               meals and           [facility]

s



     reader      00                                 at             [facility]



     (FREESTYLE                                         bedtime.           [facility]

h



     VIMAL 14                                                        



     DAY READER)                                                        



     Misc                                                        

 

     flash      2020-0      Yes       [id number] 1{each}      1 Each           [facility]

vers



     glucose      6-12                               before           ity of



     scanning      00:00:                               meals and           [facility]

[facility]



     reader      00                                 at             [facility]



     (FREESTYLE                                         bedtime.           [facility]

h



     VIMAL 14                                                        



     DAY READER)                                                        



     Misc                                                        

 

     flash      2020-0      Yes       [id number] 1{each}      1 Each           [facility]

vers



     glucose      6-12                               before           ity of



     scanning      00:00:                               meals and           [facility]

[facility]



     reader      00                                 at             [facility]



     (FREESTYLE                                         bedtime.           [facility]

h



     VIMAL 14                                                        



     DAY READER)                                                        



     Misc                                                        

 

     flash      2020-0      Yes       [id number] 1{each}      1 Each           [facility]

vers



     glucose      6-12                               before           ity of



     scanning      00:00:                               meals and           [facility]

[facility]



     reader      00                                 at             [facility]



     (FREESTYLE                                         bedtime.           [facility]

h



     VIMAL 14                                                        



     DAY READER)                                                        



     Misc                                                        

 

     flash      2020-0      Yes       [id number] 1{each}      1 Each           [facility]

vers



     glucose      6-12                               before           ity of



     scanning      00:00:                               meals and           [facility]

[facility]



     reader      00                                 at             [facility]



     (FREESTYLE                                         bedtime.           [facility]

h



     VIMAL 14                                                        



     DAY READER)                                                        



     Misc                                                        

 

     flash      2020-0      Yes       [id number] 1{each}      1 Each           [facility]

vers



     glucose      6-12                               before           ity of



     scanning      00:00:                               meals and           [facility]

s



     reader      00                                 at             [facility]



     (FREESTYLE                                         bedtime.           [facility]

h



     VIMAL 14                                                        



     DAY READER)                                                        



     Misc                                                        

 

     flash      2020-0      Yes       [id number] 1{each}      1 Each           [facility]

vers



     glucose      6-12                               before           ity of



     scanning      00:00:                               meals and           [facility]

s



     reader      00                                 at             [facility]



     (FREESTYLE                                         bedtime.           [facility]

h



     VIMAL 14                                                        



     DAY READER)                                                        



     Misc                                                        

 

     flash      2020-0      Yes       [id number] 1{each}      1 Each           [facility]

vers



     glucose      6-12                               before           ity of



     scanning      00:00:                               meals and           [facility]

[facility]



     reader      00                                 at             [facility]



     (FREESTYLE                                         bedtime.           [facility]

h



     VIMAL 14                                                        



     DAY READER)                                                        



     Misc                                                        

 

     flash      2020-0      Yes       [id number] 1{each}      1 Each           [facility]

vers



     glucose      6-12                               before           ity of



     scanning      00:00:                               meals and           [facility]

[facility]



     reader      00                                 at             [facility]



     (FREESTYLE                                         bedtime.           [facility]

h



     VIMAL 14                                                        



     DAY READER)                                                        



     Misc                                                        

 

     flash      2020-0      Yes       [id number] 1{each}      1 Each           [facility]

vers



     glucose      6-12                               before           ity of



     scanning      00:00:                               meals and           [facility]

[facility]



     reader      00                                 at             [facility]



     (FREESTYLE                                         bedtime.           [facility]

h



     VIMAL 14                                                        



     DAY READER)                                                        



     Misc                                                        

 

     flash      2020-0      Yes       [id number] 1{each}      1 Each           [facility]

vers



     glucose      6-12                               before           ity of



     scanning      00:00:                               meals and           [facility]

[facility]



     reader      00                                 at             [facility]



     (FREESTYLE                                         bedtime.           [facility]

h



     VIMAL 14                                                        



     DAY READER)                                                        



     Misc                                                        

 

     flash      2020-0      Yes       [id number] 1{each}      1 Each           [facility]

vers



     glucose      6-12                               before           ity of



     scanning      00:00:                               meals and           [facility]

[facility]



     reader      00                                 at             [facility]



     (FREESTYLE                                         bedtime.           [facility]

h



     VIMAL 14                                                        



     DAY READER)                                                        



     Misc                                                        

 

     flash      2020-0      Yes       [id number] 1{each}      1 Each           [facility]

vers



     glucose      6-12                               before           ity of



     scanning      00:00:                               meals and           [facility]

[facility]



     reader      00                                 at             [facility]



     (FREESTYLE                                         bedtime.           [facility]

h



     VIMAL 14                                                        



     DAY READER)                                                        



     Misc                                                        

 

     flash      2020-0      Yes       [id number] 1{each}      1 Each           [facility]

vers



     glucose      6-12                               before           ity of



     scanning      00:00:                               meals and           [facility]

[facility]



     reader      00                                 at             [facility]



     (FREESTYLE                                         bedtime.           [facility]

h



     VIMAL 14                                                        



     DAY READER)                                                        



     Misc                                                        

 

     flash      2020-0      Yes       [id number] 1{each}      1 Each           [facility]

vers



     glucose      6-12                               before           ity of



     scanning      00:00:                               meals and           [facility]

[facility]



     reader      00                                 at             [facility]



     (FREESTYLE                                         bedtime.           [facility]

h



     VIMAL 14                                                        



     DAY READER)                                                        



     Misc                                                        

 

     flash      2020-0      Yes       [id number] 1{each}      1 Each           [facility]

vers



     glucose      6-12                               before           ity of



     scanning      00:00:                               meals and           [facility]

[facility]



     reader      00                                 at             [facility]



     (FREESTYLE                                         bedtime.           [facility]

h



     VIMAL 14                                                        



     DAY READER)                                                        



     Misc                                                        

 

     flash      2020-0      Yes       [id number] 1{each}      1 Each           [facility]

vers



     glucose      6-12                               before           ity of



     scanning      00:00:                               meals and           [facility]

[facility]



     reader      00                                 at             [facility]



     (FREESTYLE                                         bedtime.           [facility]

h



     VIMAL 14                                                        



     DAY READER)                                                        



     Misc                                                        

 

     flash      2020-0      Yes       [id number] 1{each}      1 Each           [facility]

[facility]



     glucose      6-12                               before           ity of



     scanning      00:00:                               meals and           [place]

[facility]



     reader      00                                 at             [facility]



     (FREESTYLE                                         bedtime.           [facility]

h



     VIMAL 14                                                        



     DAY READER)                                                        



     Misc                                                        

 

     flash      2020-0      Yes       [id number] 1{each}      1 Each           [facility]

[facility]



     glucose      6-12                               before           ity of



     scanning      00:00:                               meals and           [place]

[facility]



     reader      00                                 at             [facility]



     (FREESTYLE                                         bedtime.           [facility]

h



     VIMAL 14                                                        



     DAY READER)                                                        



     Misc                                                        

 

     flash      2020-0      Yes       [id number] 1{each}      1 Each           [facility]

[facility]



     glucose      6-12                               before           ity of



     scanning      00:00:                               meals and           [facility]

[facility]



     reader      00                                 at             [facility]



     (FREESTYLE                                         bedtime.           [facility]

h



     VIMAL 14                                                        



     DAY READER)                                                        



     Misc                                                        

 

     flash      2020-0      Yes       [id number] 1{each}      1 Each           [facility]

vers



     glucose      6-12                               before           ity of



     scanning      00:00:                               meals and           [facility]

[facility]



     reader      00                                 at             [facility]



     (FREESTYLE                                         bedtime.           [facility]

h



     VIMAL 14                                                        



     DAY READER)                                                        



     Misc                                                        







Immunizations







           Ordered    Filled     Date       Status     Comments   Source



           Immunization Name Immunization Name                                  

 

           Pneumococcal            2018 Completed             [place] [facility]

[facility]



           Polysaccharide,            00:00:00                         [facility]

[facility]



           PPSV23 (PNEUMOVAX)                                             [facility]

 

           Pneumococcal            2018 Completed             [place] [facility]

[facility]



           Polysaccharide,            00:00:00                         [facility]

[facility]



           PPSV23 (PNEUMOVAX)                                             [facility]

 

           Pneumococcal            2018 Completed             [place] [facility]

[facility]



           Polysaccharide,            00:00:00                         [facility]

[facility]



           PPSV23 (PNEUMOVAX)                                             [facility]

 

           Pneumococcal            2018 Completed             [facility]

[facility]



           Polysaccharide,            00:00:00                         [facility]

[facility]



           PPSV23 (PNEUMOVAX)                                             [facility]

 

           Pneumococcal            2018 Completed             [facility]

[facility]



           Polysaccharide,            00:00:00                         [facility]

[facility]



           PPSV23 (PNEUMOVAX)                                             [facility]

 

           Pneumococcal            2018 Completed             [facility]

[facility]



           Polysaccharide,            00:00:00                         [facility]

[facility]



           PPSV23 (PNEUMOVAX)                                             [facility]

 

           Pneumococcal            2018 Completed             [facility]

[facility]



           Polysaccharide,            00:00:00                         [facility]

[facility]



           PPSV23 (PNEUMOVAX)                                             [facility]

 

           Pneumococcal            2018 Completed             [facility]

[facility]



           Polysaccharide,            00:00:00                         [facility]

[facility]



           PPSV23 (PNEUMOVAX)                                             [facility]

 

           Pneumococcal            2018 Completed             [facility]

[facility]



           Polysaccharide,            00:00:00                         [facility]

[facility]



           PPSV23 (PNEUMOVAX)                                             [facility]

 

           Pneumococcal            2018 Completed             [facility]

[facility]



           Polysaccharide,            00:00:00                         [facility]

[facility]



           PPSV23 (PNEUMOVAX)                                             [facility]

 

           Pneumococcal            2018 Completed             [facility]

[facility]



           Polysaccharide,            00:00:00                         [facility]

[facility]



           PPSV23 (PNEUMOVAX)                                             [facility]

 

           Pneumococcal            2018 Completed             [facility]

[facility]



           Polysaccharide,            00:00:00                         [facility]

[facility]



           PPSV23 (PNEUMOVAX)                                             [facility]

 

           Pneumococcal            2018 Completed             [facility]

[facility]



           Polysaccharide,            00:00:00                         [facility]

[facility]



           PPSV23 (PNEUMOVAX)                                             [facility]

 

           Pneumococcal            2018 Completed             [facility]

[facility]



           Polysaccharide,            00:00:00                         [facility]

[facility]



           PPSV23 (PNEUMOVAX)                                             [facility]

 

           Pneumococcal            2018 Completed             [facility]

[facility]



           Polysaccharide,            00:00:00                         [facility]

[facility]



           PPSV23 (PNEUMOVAX)                                             [facility]

 

           Pneumococcal            2018 Completed             [facility]

[facility]



           Polysaccharide,            00:00:00                         [facility]

[facility]



           PPSV23 (PNEUMOVAX)                                             [facility]

 

           Pneumococcal            2018 Completed             [facility]

[facility]



           Polysaccharide,            00:00:00                         [facility]

[facility]



           PPSV23 (PNEUMOVAX)                                             [facility]

 

           Pneumococcal            2018 Completed             [facility]

[facility]



           Polysaccharide,            00:00:00                         [facility]

[facility]



           PPSV23 (PNEUMOVAX)                                             [facility]

 

           Pneumococcal            2018 Completed             [facility]

[facility]



           Polysaccharide,            00:00:00                         [facility]

[facility]



           PPSV23 (PNEUMOVAX)                                             [facility]

 

           Pneumococcal            2018 Completed             [facility]

[facility]



           Polysaccharide,            00:00:00                         [facility]

[facility]



           PPSV23 (PNEUMOVAX)                                             [facility]

 

           Pneumococcal            2018 Completed             [facility]

[facility]



           Polysaccharide,            00:00:00                         [facility]

[facility]



           PPSV23 (PNEUMOVAX)                                             [facility]

 

           Pneumococcal            2018 Completed             [facility]

[facility]



           Polysaccharide,            00:00:00                         [facility]

[facility]



           PPSV23 (PNEUMOVAX)                                             [facility]

 

           Pneumococcal            2018 Completed             [facility]

[facility]



           Polysaccharide,            00:00:00                         [facility]

[facility]



           PPSV23 (PNEUMOVAX)                                             [facility]

 

           Pneumococcal            2018 Completed             [facility]

[facility]



           Polysaccharide,            00:00:00                         [facility]

[facility]



           PPSV23 (PNEUMOVAX)                                             [facility]

 

           Pneumococcal            2018 Completed             [facility]

[facility]



           Polysaccharide,            00:00:00                         [facility]

[facility]



           PPSV23 (PNEUMOVAX)                                             [facility]

 

           Pneumococcal            2018 Completed             [facility]

[facility]



           Polysaccharide,            00:00:00                         [facility]

[facility]



           PPSV23 (PNEUMOVAX)                                             [facility]

 

           Pneumococcal            2018 Completed             [facility]

[facility]



           Polysaccharide,            00:00:00                         [facility]

[facility]



           PPSV23 (PNEUMOVAX)                                             [facility]

 

           Pneumococcal            2018 Completed             [facility]

[facility]



           Polysaccharide,            00:00:00                         [facility]

[facility]



           PPSV23 (PNEUMOVAX)                                             [facility]

 

           Pneumococcal            2018 Completed             [facility]

[facility]



           Polysaccharide,            00:00:00                         [facility]

[facility]



           PPSV23 (PNEUMOVAX)                                             [facility]

 

           Pneumococcal            2018 Completed             [facility]

[facility]



           Polysaccharide,            00:00:00                         [facility]

[facility]



           PPSV23 (PNEUMOVAX)                                             [facility]

 

           Pneumococcal            2018 Completed             [facility]

[facility]



           Polysaccharide,            00:00:00                         [facility]

[facility]



           PPSV23 (PNEUMOVAX)                                             [facility]

 

           Pneumococcal            2018 Completed             [facility]

[facility]



           Polysaccharide,            00:00:00                         [facility]

[facility]



           PPSV23 (PNEUMOVAX)                                             [facility]

 

           Pneumococcal            2018 Completed             [facility]

[facility]



           Polysaccharide,            00:00:00                         [facility]

[facility]



           PPSV23 (PNEUMOVAX)                                             [facility]

 

           Pneumococcal            2018 Completed             [facility]

[facility]



           Polysaccharide,            00:00:00                         [facility]

[facility]



           PPSV23 (PNEUMOVAX)                                             [facility]

 

           Pneumococcal            2018 Completed             [facility]

[facility]



           Polysaccharide,            00:00:00                         [facility]

[facility]



           PPSV23 (PNEUMOVAX)                                             [facility]

 

           Pneumococcal            2018 Completed             [facility]

[facility]



           Polysaccharide,            00:00:00                         [facility]

[facility]



           PPSV23 (PNEUMOVAX)                                             [facility]

 

           Pneumococcal            2018 Completed             [facility]

[facility]



           Polysaccharide,            00:00:00                         [facility]

[facility]



           PPSV23 (PNEUMOVAX)                                             [facility]

 

           Pneumococcal            2018 Completed             [facility]

[facility]



           Polysaccharide,            00:00:00                         [facility]

[facility]



           PPSV23 (PNEUMOVAX)                                             [facility]

 

           Pneumococcal            2018 Completed             [facility]

[facility]



           Polysaccharide,            00:00:00                         [facility]

[facility]



           PPSV23 (PNEUMOVAX)                                             [facility]

 

           Influenza Virus            2015-10-23 Completed             [facility]

[facility] of



           Vaccine Quad ID            00:00:00                         [facility]

[facility]



           18-64 YRS                                              [facility]

 

           Influenza Virus            2015-10-23 Completed             [facility]

[facility]



           Vaccine Quad ID            00:00:00                         [facility]

[facility]



           18-64 YRS                                              [facility]

 

           Influenza Virus            2015-10-23 Completed             [facility]

[facility]



           Vaccine Quad ID            00:00:00                         [facility]

[facility]



           18-64 YRS                                              [facility]

 

           Influenza Virus            2015-10-23 Completed             [facility]

[facility] of



           Vaccine Quad ID            00:00:00                         [facility]

[facility]



           18-64 YRS                                              [facility]

 

           Influenza Virus            2015-10-23 Completed             [facility]

[facility] of



           Vaccine Quad ID            00:00:00                         [facility]

[facility]



           18-64 YRS                                              [facility]

 

           Influenza Virus            2015-10-23 Completed             [facility]

[facility] of



           Vaccine Quad ID            00:00:00                         [facility]

[facility]



           18-64 YRS                                              [facility]

 

           Influenza Virus            2015-10-23 Completed             [facility]

[facility] of



           Vaccine Quad ID            00:00:00                         [facility]

[facility]



           18-64 YRS                                              [facility]

 

           Influenza Virus            2015-10-23 Completed             [facility]

[facility] of



           Vaccine Quad ID            00:00:00                         [facility]

[facility]



           18-64 YRS                                              [facility]

 

           Influenza Virus            2015-10-23 Completed             [facility]

[facility]



           Vaccine Quad ID            00:00:00                         [facility]

[facility]



           18-64 YRS                                              [facility]

 

           Influenza Virus            2015-10-23 Completed             [facility]

[facility]



           Vaccine Quad ID            00:00:00                         [facility]

[facility]



           18-64 YRS                                              [facility]

 

           Influenza Virus            2015-10-23 Completed             [facility]

[facility] of



           Vaccine Quad ID            00:00:00                         [facility]

[facility]



           18-64 YRS                                              [facility]

 

           Influenza Virus            2015-10-23 Completed             [facility]

[facility] of



           Vaccine Quad ID            00:00:00                         [facility]

[facility]



           18-64 YRS                                              [facility]

 

           Influenza Virus            2015-10-23 Completed             [facility]

[facility]



           Vaccine Quad ID            00:00:00                         [facility]

[facility]



           18-64 YRS                                              [facility]

 

           Influenza Virus            2015-10-23 Completed             [facility]

[facility]



           Vaccine Quad ID            00:00:00                         [facility]

[facility]



           18-64 YRS                                              [facility]

 

           Influenza Virus            2015-10-23 Completed             [facility]

[facility]



           Vaccine Quad ID            00:00:00                         [facility]

[facility]



           18-64 YRS                                              [facility]

 

           Influenza Virus            2015-10-23 Completed             [facility]

[facility]



           Vaccine Quad ID            00:00:00                         [facility]

[facility]



           18-64 YRS                                              [facility]

 

           Influenza Virus            2015-10-23 Completed             [facility]

[facility]



           Vaccine Quad ID            00:00:00                         [facility]

[facility]



           18-64 YRS                                              [facility]

 

           Influenza Virus            2015-10-23 Completed             [facility]

[facility]



           Vaccine Quad ID            00:00:00                         [facility]

[facility]



           18-64 YRS                                              [facility]

 

           Influenza Virus            2015-10-23 Completed             [facility]

[facility]



           Vaccine Quad ID            00:00:00                         [facility]

[facility]



           18-64 YRS                                              [facility]

 

           Influenza Virus            2015-10-23 Completed             [facility]

[facility]



           Vaccine Quad ID            00:00:00                         [facility]

[facility]



           18-64 YRS                                              [facility]

 

           Influenza Virus            2015-10-23 Completed             [facility]

[facility]



           Vaccine Quad ID            00:00:00                         [facility]

[facility]



           18-64 YRS                                              [facility]

 

           Influenza Virus            2015-10-23 Completed             [facility]

[facility] of



           Vaccine Quad ID            00:00:00                         [facility]

[facility]



           18-64 YRS                                              [facility]

 

           Influenza Virus            2015-10-23 Completed             [facility]

[facility] of



           Vaccine Quad ID            00:00:00                         [facility]

[facility]



           18-64 YRS                                              [facility]

 

           Influenza Virus            2015-10-23 Completed             [facility]

[facility] of



           Vaccine Quad ID            00:00:00                         [facility]

[facility]



           18-64 YRS                                              [facility]

 

           Influenza Virus            2015-10-23 Completed             [facility]

[facility] of



           Vaccine Quad ID            00:00:00                         [facility]

[facility]



           18-64 YRS                                              [facility]

 

           Influenza Virus            2015-10-23 Completed             [facility]

[facility] of



           Vaccine Quad ID            00:00:00                         [facility]

[facility]



           18-64 YRS                                              [facility]

 

           Influenza Virus            2015-10-23 Completed             [facility]

[facility] of



           Vaccine Quad ID            00:00:00                         [facility]

[facility]



           18-64 YRS                                              [facility]

 

           Influenza Virus            2015-10-23 Completed             [facility]

[facility] of



           Vaccine Quad ID            00:00:00                         [facility]

[facility]



           18-64 YRS                                              [facility]

 

           Influenza Virus            2015-10-23 Completed             [facility]

[facility] of



           Vaccine Quad ID            00:00:00                         [facility]

[facility]



           18-64 YRS                                              [facility]

 

           Influenza Virus            2015-10-23 Completed             [facility]

[facility] of



           Vaccine Quad ID            00:00:00                         [facility]

[facility]



           18-64 YRS                                              [facility]

 

           Influenza Virus            2015-10-23 Completed             [facility]

[facility] of



           Vaccine Quad ID            00:00:00                         [facility]

[facility]



           18-64 YRS                                              [facility]

 

           Influenza Virus            2015-10-23 Completed             [facility]

[facility] of



           Vaccine Quad ID            00:00:00                         [facility]

[facility]



           18-64 YRS                                              [facility]

 

           Influenza Virus            2015-10-23 Completed             [facility]

[facility] of



           Vaccine Quad ID            00:00:00                         [facility]

[facility]



           18-64 YRS                                              [facility]

 

           Influenza Virus            2015-10-23 Completed             [facility]

[facility] of



           Vaccine Quad ID            00:00:00                         [facility]

[facility]



           18-64 YRS                                              [facility]

 

           Influenza Virus            2015-10-23 Completed             [facility]

[facility] of



           Vaccine Quad ID            00:00:00                         [facility]

[facility]



           18-64 YRS                                              [facility]

 

           Influenza Virus            2015-10-23 Completed             [facility]

[facility] of



           Vaccine Quad ID            00:00:00                         [facility]

[facility]



           18-64 YRS                                              [facility]

 

           Influenza Virus            2015-10-23 Completed             [facility]

[facility] of



           Vaccine Quad ID            00:00:00                         [facility]

[facility]



           18-64 YRS                                              [facility]

 

           Influenza Virus            2015-10-23 Completed             [facility]

[facility] of



           Vaccine Quad ID            00:00:00                         [facility]

[facility]



           18-64 YRS                                              [facility]

 

           Influenza Virus            2015-10-23 Completed             [facility]

[facility] of



           Vaccine Quad ID            00:00:00                         [facility]

[facility]



           18-64 YRS                                              [facility]

 

           Influenza Virus            Unknown    Completed             [facility]

[facility] of



           Vaccine Quad ID                                             [facility]

[facility]



           18-64 YRS                                              [facility]

 

           Pneumococcal            Unknown    Completed             [facility]

f



           Polysaccharide,                                             [facility]

[facility]



           PPSV23 (PNEUMOVAX)                                             [facility]

 

           Influenza Virus            Unknown    Completed             [facility]

[facility]



           Vaccine Quad ID                                             [facility]

[facility]



           18-64 YRS                                              [facility]

 

           Pneumococcal            Unknown    Completed             [facility]

[facility]



           Polysaccharide,                                             [facility]

[facility]



           PPSV23 (PNEUMOVAX)                                             [facility]

 

           Influenza Virus            Unknown    Completed             [facility]

[facility] of



           Vaccine Quad ID                                             [facility]

[facility]



           18-64 YRS                                              [facility]

 

           Pneumococcal            Unknown    Completed             [facility]

f



           Polysaccharide,                                             [facility]

[facility]



           PPSV23 (PNEUMOVAX)                                             Branch







Vital Signs







             Vital Name   Observation Time Observation Value Comments     Source

 

             Systolic blood 2023 17:40:00 148 mm[Hg]                [facility]

[facility] [place]



             pressure                                            [facility]

 

             Diastolic blood 2023 17:40:00 84 mm[Hg]                 [facility]



             pressure                                            [facility]

 

             Heart rate   2023 17:25:00 102 /min                  [facility]

 

             Body weight  2023 17:25:00 131.498 kg                [facility]

 

             BMI          2023 17:25:00 41.60 kg/m2               [facility]

 

             Oxygen saturation 2023 17:25:00 94 /min                   [facility]



             in Arterial blood                                        [facility]

anch



             by Pulse oximetry                                        







Procedures







                Procedure       Date / Time     Performing Clinician Source



                                Performed                       

 

                POCT HEMOGLOBIN A1C TEST 2023 17:41:00 Jeff Velásquez

[facility]

 

                DIABETES TESTING REPORTS 2023 06:01:00 Doctor Satya, 

[facility] [place]

 

                INSURANCE CORRESPONDENCE 2022-10-25 05:01:00 Doctor Satya, 

[facility] [place]

 

                DIABETES TESTING REPORTS 2022 05:01:00 Doctor Satya, 

[facility] [place]

 

                POCT HEMOGLOBIN A1C TEST 2022 00:00:00 Jeff Velásquez

[facility]

 

                [id number]         2021 00:00:00 [facility]

 

                [id number]         2021 00:00:00 [facility]

 

                [id number]         [facility]

 

                [id number]         2020 00:00:00 GOYRO           [facility]

 

                [id number]         2020 00:00:00 [facility]

 

                [id number]         2020-06-15 00:00:00 [facility]

 

                [id number]         2020-06-15 00:00:00 [facility]







Encounters







        Start   End     Encounter Admission Attending Care    Care    Encounter 

Source



        Date/Time Date/Time Type    Type    Clinicians Facility Department ID   

   

 

        2021         Inpatient EM      Stocks, HCATO   HCATO   [id number] 

[facility]



        04:57:30                         Son                 74      [place]



                                                                        [facility]



                                                                        dic



                                                                        Hospita



                                                                        l

 

        2021         Inpatient EL      Stocks, HCATO   HCATO   [id number] 

[facility]



        20:57:07                         Sno                 04      [place]



                                                                        [facility]



                                                                        dic



                                                                        Hospita



                                                                        l

 

        2020-10-08         Inpatient EL      Isabel, HCATO   SURG    [id number] 

[facility]



        10:05:00                         German                   16      [place]



                                                                        [facility]



                                                                        dic



                                                                        Hospita



                                                                        l

 

        2020         Inpatient         Isabel, HCATO   ADMI    [id number] 

[facility]



        08:00:00                         German                   66      [place]



                                                                        [facility]



                                                                        dic



                                                                        Hospita



                                                                        l

 

        2020-06-15         Inpatient EL      Stocks, HCATO   SURG    [id number] 

[facility]



        13:00:00                         Son                 44      [place]



                                                                        [facility]



                                                                        dic



                                                                        Hospita



                                                                        l

 

        2023-10-06 2023-10-06 Telephone         [facility]    1.2.840.114 [id number]

[id number] Univers



        00:00:00 00:00:00                 Jeff H HEALTH  350.1.13.10         it

y of



                                                [place] 4.2.7.2.686         Rafiq

as



                                                SILVESTRE?BLEA [phone]         [address]                 

 

        2023-10-03 2023-10-03 [facility]    1.2.820.560 [id number]

[id number] Univers



        00:00:00 00:00:00                 Jeff H HEALTH  350.1.13.10         it

y of



                                                [place] 4.2.7.2.686         Rafiq

as



                                                SILVESTRE?BLEA [phone]         [address]                 

 

        2023 [facility] R       [facility]    [id number]

[id number] [facility]



        13:30:00 13:30:00                 JEFF                           ity [facility]

 

        2023 [facility]    1.2.116.353 [id number]

[id number] Univers



        00:00:00 00:00:00                 Jeff Dot  350.1.13.10         it

y of



                                                [place] 4.2.7.2.686         Rafiq

as



                                                SILVESTRE?BLEA [phone]         [address]                 

 

        2023 Refill          [facility]    1.2.968.201 [id number]

[id number] Univers



        00:00:00 00:00:00                 Jeff Norstel HEALTH  350.1.13.10         it

y of



                                                [place] 4.2.7.2.686         Rafiq

as



                                                SILVESTRE?BLEA [phone]         [address]                 

 

        2023 [facility]          [facility]    1.2.713.408 [id number]

[id number] Univers



        00:00:00 00:00:00                 Jeff H HEALTH  350.1.13.10         it

y of



                                                [place] 4.2.7.2.686         Rafiq

as



                                                SILVESTRE?BLEA [phone]         [facility] [address]                 

 

        2023 Refill          [facility]    1.2.723.443 [id number]

[id number] Univers



        00:00:00 00:00:00                 Jeff H HEALTH  350.1.13.10         it

y of



                                                [place] 4.2.7.2.686         Rafiq

as



                                                SILVESTRE?BLEA [phone]         [facility]



                                                ALEXEI[address]                 

 

        2023 Telephone         [facility]    1.2.840.114 [id number]

[id number] Univers



        00:00:00 00:00:00                 Jeff H HEALTH  350.1.13.10         it

y of



                                                [place] 4.2.7.2.686         Rafiq

as



                                                SILVESTRE?BLEA [phone]         [address]                 

 

        2023 Telephone         [facility]    1.2.840.114 10

[id number] Univers



        00:00:00 00:00:00                 Jeff H HEALTH  350.1.13.10         it

y of



                                                [place] 4.2.7.2.686         Rafiq

as



                                                SILVESTRE?BLEA [phone]         [address]                 

 

        2023 Outpatient         FOG_Stocks_ AOSM    AOSM    580

5866-20 [place]



        00:00:00 00:00:00                 Joseline                 [id number]  Orth

ope



                                                                        dic



                                                                        Sports



                                                                        Medicin



                                                                        e

 

        2023 Outpatient         FOG_Stocks_ AOSM    AOSM    580

5866-20 [place]



        00:00:00 00:00:00                 Joseline                 [id number]  Orth

ope



                                                                        dic



                                                                        Sports



                                                                        Medicin



                                                                        e

 

        2023 Outpatient         FOG_Stocks_ AOSM    AOSM    580

5866-20 [place]i



        00:00:00 00:00:00                 Joseline                 [id number]  Orth

ope



                                                                        dic



                                                                        Sports



                                                                        Medicin



                                                                        e

 

        2023 Outpatient         FOG_Stocks_ AOSM    AOSM    580

5866-20 De[place]i



        00:00:00 00:00:00                 Joseline                 [id number]  Orth

ope



                                                                        dic



                                                                        Sports



                                                                        Medicin



                                                                        e

 

        2023-05-10 2023-05-10 Refill          [facility]    1.2.840.114 [id number]

[id number] Univers



        00:00:00 00:00:00                 [place]   [place] 350.1.13.10         i

ty of



                                                [place] 4.2.7.2.686         [place]

[place]



                                                PROFESSIO [phone]         Me

dical



                                                [address]                 

 

        2023 Telephone         [facility]    1.2.840.114 10

[id number] Univers



        00:00:00 00:00:00                 Jeff H HEALTH  350.1.13.10         it

y of



                                                [place] 4.2.7.2.686         Rafiq

as



                                                SILVESTRE?BLEA [phone]         Me

dical



                                                [facility]    220             [facility]                 



                                                OFFICE                  



                                                [facility]                 

 

        2023 Telephone         [facility]    1.2.840.114 10

[id number] Univers



        00:00:00 00:00:00                 Jeff H HEALTH  350.1.13.10         it

y of



                                                [place] 4.2.7.2.686         Rafiq

as



                                                SILVESTRE?BLEA [phone]         Me

dical



                                                [facility]    220             [facility]                 



                                                OFFICE                  



                                                [facility]                 

 

        2023 Telephone         [facility]    1.2.840.114 10

[id number] Univers



        00:00:00 00:00:00                 Jfef H HEALTH  350.1.13.10         it

y of



                                                [place] 4.2.7.2.686         Rafiq

as



                                                SILVESTRE?BLEA [phone]         Me

dic[address]                 



                                                OFFICE                  



                                                [facility]                 

 

        2023 Telephone         [facility]    1.2.840.114 10

[id number] Univers



        00:00:00 00:00:00                 Jeff H HEALTH  350.1.13.10         it

y of



                                                [place] 4.2.7.2.686         Rafiq

as



                                                SILVESTRE?BLEA [phone]         Me

dical



                                                [address]                 



                                                OFFICE                  



                                                [facility]                 

 

        2023 Outpatient R       [facility]    [id number]

[id number] Univers



        11:30:00 12:12:35                 JEFF                           ity [facility]

 

        202324 Office          [facility]    1.2.115.456 [id number]

[id number] Univers



        11:30:00 12:12:35 Visit           Jeff [facility]  350.1.13.10         it

y of



                                                [place] 4.2.7.2.686         Rafiq

as



                                                SILVESTRE?BLEA [phone]         [address]                 



                                                OFFICE                  



                                                [facility]                 

 

        2023 Orders          Doctor  EM    1.2.840.114 [id number]

[id number] Univers



        00:00:00 00:00:00 Only            Unassigned, ELIDA   350.1.13.10       

  ity of



                                        [facility] 4.2.7.2.686         Rafiq

as



                                                        [phone]         [address]

 

        2023 Refill          [facility]    1.2.353.499 [id number]

12209 Univers



        00:00:00 00:00:00                 Jeff H HEALTH  350.1.13.10         it

y of



                                                [place] 4.2.7.2.686         Rafiq

as



                                                SILVESTRE?BLEA [phone]         [address]                 



                                                OFFICE                  



                                                [facility]                 

 

        2022 Telephone         [facility]    1.2.840.114 [id number]

[id number] Univers



        00:00:00 00:00:00                 Jeff H MULTISPEC 350.1.13.10         

ity of



                                                [place]   4.2.7.2.686         [place]

[place]



                                                CENTER  [phone]         [address]



                                                DIABETES                 



                                                CLINIC                  

 

        2022-11-15 2022-11-15 Telephone         [facility]    1.2.840.114 [id number]

[id number] Univers



        00:00:00 00:00:00                 Jeff H PRIMARY 350.1.13.10         it

y of



                                                CARE    4.2.7.2.686         [place]

[place]



                                                PAVILLION [phone]         [address]

 

        2022 Telephone         [facility]    1.2.840.114 98

[id number] Univers



        00:00:00 00:00:00                 Jeff H MULTISPEC 350.1.13.10         

ity of



                                                IAL[place]   4.2.7.2.686         [place]

[place]



                                                CENTER  [phone]         Medi

hilario



                                                [address] [address]             [place]



                                                DIABETES                 



                                                CLINIC                  

 

        2022 [facility]    1.2.840.114 [id number]

[id number] Univers



        00:00:00 00:00:00                 Rosario  HEALTH  350.1.13.10         it

y of



                                                [place] 4.2.7.2.686         Rafiq

as



                                                SILVESTRE?BLEA [phone]         [address]



                                                MEDICAL                 



                                                OFFICE                  



                                                [facility]                 

 

        2022-11-10 2022-11-10 Telephone         [facility]    1.2.840.114 [id number]

[id number] Univers



        00:00:00 00:00:00                 Jeff H HEALTH  350.1.13.10         it

y of



                                                [place] 4.2.7.2.686         Rafiq

as



                                                SILVESTRE?BLEA [phone]         [address]                 



                                                OFFICE                  



                                                [facility]                 

 

        2022 Telephone         [facility]    1.2.840.114 [id number]

[id number] Univers



        00:00:00 00:00:00                 Jeff H HEALTH  350.1.13.10         it

y of



                                                [place] 4.2.7.2.686         Rafiq

as



                                                SILVESTRE?BLEA [phone]         [address]                 



                                                OFFICE                  



                                                [facility]                 

 

        2022-10-31 2022-10-31 Telephone         [facility]    1.2.840.114 97

[id number] Univers



        00:00:00 00:00:00                 Jeff H HEALTH  350.1.13.10         it

y of



                                                [place] 4.2.7.2.686         Rafiq

as



                                                SILVESTRE?BLEA [phone]         [address]                 



                                                OFFICE                  



                                                [facility]                 

 

        2022-10-25 2022-10-25 Orders          Doctor  EM    1.2.840.114 [id number]

[id number] Univers



        00:00:00 00:00:00 Only            Unassigned, ELIDA   350.1.13.10       

  ity of



                                        [facility] 4.2.7.2.686         Rafiq

as



                                                        [phone]         [address]

 

        2022-10-24 2022-10-24 Telephone         [facility]    1.2.840.114 [id number]

[id number] Univers



        00:00:00 00:00:00                 Jeff H HEALTH  350.1.13.10         it

y of



                                                [place] 4.2.7.2.686         Rafiq

as



                                                SILVESTRE?BLEA [phone]         [address]                 



                                                OFFICE                  



                                                [facility]                 

 

        2022-10-17 2022-10-17 Telephone         [facility]    1.2.840.114 [id number]

[id number] Univers



        00:00:00 00:00:00                 Jeff [facility]  350.1.13.10         it

y of



                                                [place] 4.2.7.2.686         Rafiq

as



                                                SILVESTRE?BLEA [phone]         [address]                 



                                                OFFICE                  



                                                [facility]                 

 

        2022 Outpatient R       [facility]    [id number]

[id number] Univers



        09:00:00 10:09:35                 JEFF kessler [facility]

 

        2022 Office          [facility]    1.2.869.650 [id number]

0429 Univers



        09:00:00 10:09:35 Visit           [facility] LXSN  350.1.13.10         it

y of



                                                [place] 4.2.7.2.686         Rafiq

as



                                                SILVESTRE?BLEA [phone]         [address]                 



                                                OFFICE                  



                                                [facility]                 

 

        2022 Outpatient R       [facility]    [id number]

[id number] Univers



        09:00:00 09:00:00                 JEFF kessler [facility]

 

        2022 Orders          Doctor  EM    1.2.840.114 [id number]

86 Univers



        00:00:00 00:00:00 Only            Unassigned, ELIDA   350.1.13.10       

  ity of



                                        [facility] 4.2.7.2.686         Rafiq

as



                                                        [phone]         [address]

 

        2022 Refill          [facility]    1.2.840.114 [id number]

81 Univers



        00:00:00 00:00:00                 Rosario  HEALTH  350.1.13.10         it

y of



                                                [place] 4.2.7.2.686         Rafiq

as



                                                SILVESTRE?BLEA [phone]         [address]                 



                                                OFFICE                  



                                                [facility]                 

 

        2022 Telephone         Christiano  [facility]    1.2.331.556 [id number]

3168 Univers



        00:00:00 00:00:00                 Rosario  HEALTH  350.1.13.10         it

y of



                                                [place] 4.2.7.2.686         Rafiq

as



                                                SLIVESTRE?BLEA [phone]         [address]                 



                                                OFFICE                  



                                                [facility]                 

 

        2022 Refill          [facility]    1.2.553.596 [id number]

6345 Univers



        00:00:00 00:00:00                 Jeff H HEALTH  350.1.13.10         it

y of



                                                [place] 4.2.7.2.686         Rafiq

as



                                                SILVESTRE?BLEA [phone]         [address]                 



                                                OFFICE                  



                                                [facility]                 

 

        2022 Refill          [facility]    1.2.840.114 [id number]

75 Univers



        00:00:00 00:00:00                 Rosario  HEALTH  350.1.13.10         it

y of



                                                [place] 4.2.7.2.686         Rafiq

as



                                                SILVESTRE?BLEA [phone]         [address]                 



                                                OFFICE                  



                                                [facility]                 

 

        2022 Refill          [facility]    1.2.760.400 [id number]

5232 Univers



        00:00:00 00:00:00                 Jeff H HEALTH  350.1.13.10         it

y of



                                                [place] 4.2.7.2.686         Rafiq

as



                                                SILVESTRE?BLEA [phone]         [address]                 



                                                OFFICE                  



                                                [facility]                 

 

        2022 Patient         [facility]    1.2.840.114 [id number]

50 Univers



        00:00:00 00:00:00 Secure Msg         Rosario  MULTISPEC 350.1.13.10      

   ity of



                                                IALTY   4.2.7.2.686         [place]

[place]



                                                [place]  [phone]         Medi

hilario



                                                AND [address]



                                                DIABETES                 



                                                CLINIC                  

 

        2022 Office          [facility]    1.2.840.114 [id number]

76 Univers



        13:00:00 13:56:52 Visit           Rosario  HEALTH  350.1.13.10         it

y of



                                                [place] 4.2.7.2.686         Rafiq

as



                                                SILVESTRE?BLEA [phone]         [address]                 



                                                OFFICE                  



                                                BUILDING                 

 

        2022 Outpatient R       [facility]    [id number]

488 Univers



        13:00:00 13:56:52                 [facility]

 

        2022 Outpatient R       [facility]    [id number]

488 Univers



        13:00:00 13:00:00                 [facility]

 

        [facility] Telephone         [facility]    1.2.718.708 [id number]

3083 Univers



        00:00:00 00:00:00                 Rosario PRUITT 350.1.13.10         i

ty of



                                                [facility] 4.2.7.2.686         [place]

[place]



                                                PROFESSIO [phone]         Me

dic[address]                 

 

        2022 Outpatient R       [facility]    [id number]

7789 Univers



        15:30:00 15:30:00                 [facility]

 

        2022 Office          [facility]    1.2.840.114

 [id number] Univers



        09:00:00 12:00:25 Visit           Rm, Adc Surg Spec Procedure EDMOND 3

50.1.13.10         ity 

of



                                                [facility] 4.2.7.2.686         [place]

[place]



                                                PROFESSIO [phone]         [facility]     204             [facility]                 

 

        2022 Outpatient R       [facility]    [id number]

7789 Univers



        09:00:00 12:00:25                 [facility]

 

        2022 Outpatient R       [facility]    [id number]

7789 Univers



        09:00:00 09:00:00                 [facility]

 

        2022 [facility]    1.2.951.853 [id number]

9357 Univers



        00:00:00 00:00:00                 Jeff PRUITT 350.1.13.10         i

ty of



                                                [facility] 4.2.7.2.686         [place]

[place]



                                                PROFESSIO [phone]         Me

dical



                                                [facility]     220             [facility]                 

 

        202218 Refill          [facility]    1.2.195.500 [id number]

9357 Univers



        00:00:00 00:00:00                 Jeff H [place] 350.1.13.10         i

ty of



                                                [address] 4.2.7.2.686         [place]

[place]



                                                PROFESSIO [phone]         [facility]     220             [facility]                 

 

        2022 Orders          Doctor  EM    1.2.840.114 [id number]

50 Univers



        00:00:00 00:00:00 Only            Unassigned, ELIDA   350.1.13.10       

  ity of



                                        [facility] 4.2.7.2.686         Rafiq

as



                                                        [phone]         [address]

 

        2022 Telephone         [facility]    1.2.840.114 [id number]

09016 Univers



        00:00:00 00:00:00                 [place] PRUITT 350.1.13.10         i

ty of



                                                [address] 4.2.7.2.686         [place]

[place]



                                                PROFESSIO [phone]         [facility]     204             [facility]                 

 

        2022 Outpatient R       [facility]    [id number]

8963 Univers



        10:00:00 10:50:57                 [place]                           ity [facility]

 

        2022 Office          [facility]    1.2.840.114 [id number]

151 Univers



        10:00:00 10:50:57 Visit           [place] PRUITT 350.1.13.10         i

ty of



                                                [address] 4.2.7.2.686         [place]

[place]



                                                PROFESSIO [phone]         [address]                 

 

        2022 Outpatient R       [facility]    [id number]

8963 Univers



        10:00:00 10:50:57                 [place]                           ity [facility]

 

        2022 Patient         [facility]    1.2.840.114 [id number]

015 Univers



        00:00:00 00:00:00 Secure Msg         [place] PRUITT 350.1.13.10       

  ity of



                                                [address] 4.2.7.2.686         [place]

[place]



                                                PROFESS[place] [phone]         Me

dical



                                                NAL     204             [facility]                 

 

        2022 Telephone         [facility]    1.2.840.114 [id number]

50270 Univers



        00:00:00 00:00:00                 [place]   [place] 350.1.13.10         i

ty of



                                                [address] 4.2.7.2.686         [place]

[place]



                                                PROFESS[place] [phone]         Me

dical



                                                NAL     204             [facility]                 

 

        2022 Technician         Maria T, Adc Lab Main [facility]    1.2.8

40.114 [id number] 

Univers



        12:30:00 12:45:00 Visit           Aranza [place] 350.1.13.10   

      ity of



                                                [address] 4.2.7.2.686         [place]

[place]



                                                PROFESSIO [phone]         Me

[facility]     353             [facility]                 

 

        2022 [facility] R       [facility]    [id number]

3418 Univers



        12:30:00 12:30:00                 [place]                           ity [facility]

 

        2022-03-10 2022-03-10 Telephone         [facility]    1.2.840.114 [id number]

31409 Univers



        00:00:00 00:00:00                 [place]   [facility] 350.1.13.10         i

ty of



                                                [address] 4.2.7.2.686         [place]

[place]



                                                PROFESSIO [phone]         Me

meganal



                                                NAL     204             [facility]                 

 

        2022 Outpatient R       [facility]    [id number]

6282 Univers



        10:21:45 23:59:00                 [place]                           ity [facility]

 

        2022 [facility]    1.2.752.306 [id number]

3638 Univers



        10:21:45 23:59:00 Encounter         [place]   SPECIALTY 350.1.13.10       

  ity of



                                                CARE    4.2.7.2.686         [place]

[place]



                                                CENTER .[id number]         Me

michael CROSS 804             [facility]                   

 

        2022 [facility]patient R       [facility]    [id number]

6282 Univers



        10:21:45 23:59:00                 [place]                           ity [facility]

 

        2022 Telephone         [facility]    1.2.840.114 [id number]

86533 Univers



        00:00:00 00:00:00                 [place]   EDMOND 350.1.13.10         i

ty of



                                                [facility] 4.2.7.2.686         [place]

[place]



                                                PROFESSIO [phone]         [facility]     204             [facility]                 

 

        2022 Outpatient R       [facility]    [id number]

5402 Univers



        09:00:00 09:36:20                 [place]                           ity [facility]

 

        2022 Office          [facility]    1.2.840.114 [id number]

961 Univers



        09:00:00 09:36:20 Visit           [place]   [place] 350.1.13.10         i

ty of



                                                [address] 4.2.7.2.686         [place]

[place]



                                                PROFESSIO [phone]         [address]                 

 

        2022 Orders          Doctor  EM    1.2.840.114 [id number]

35 Univers



        00:00:00 00:00:00 Only            Unassigned, ELIDA   350.1.13.10       

  ity of



                                        [facility] 4.2.7.2.686         Rafiq

as



                                                        [phone]         [address]

 

        2022 Outpatient R       DESTINEE [facility]    [id number]

64141 Univers



        10:30:00 10:30:00                 JEFF                           [facility]

 

        2021 Telephone         [facility]    1.2.785.987 [id number]

4990 Univers



        00:00:00 00:00:00                 Rosario PRUITT 350.1.13.10         i

ty of



                                                [address] 4.2.7.2.686         [place]

[place]



                                                PROFESSIO [phone]         [facility]     220             [facility]                 

 

        2021 Telephone         [facility]    1.2.484.996 [id number]

6092 Univers



        00:00:00 00:00:00                 [facility]  350.1.13.10         it

y of



                                                [place] 4.2.7.2.686         Rafiq

as



                                                SILVESTRE?BLEA [phone]         [facility]    220             [place]



                                                MEDICAL                 



                                                OFFICE                  



                                                [facility]                 

 

        2021 Telephone         Christiano  [facility]    1.2.020.403 [id number]

4489 Univers



        00:00:00 00:00:00                 Rosario  HEALTH  350.1.13.10         it

y of



                                                [place] 4.2.7.2.686         Rafiq

as



                                                SILVESTRE?BLEA [phone]         [facility]    220             [place]



                                                MEDICAL                 



                                                OFFICE                  



                                                [facility]                 

 

        2021 Telephone         [facility]    1.2.327.784 [id number]

7777 Univers



        00:00:00 00:00:00                 Rosario  HEALTH  350.1.13.10         it

y of



                                                [place] 4.2.7.2.686         Rafiq

as



                                                SILVESTRE?BLEA [phone]         [address]



                                                MEDICAL                 



                                                OFFICE                  



                                                [facility]                 

 

        2021 Orders          Doctor  EM    1.2.840.114 [id number]

97 Univers



        00:00:00 00:00:00 Only            Unassigned, ELIDA   350.1.13.10       

  ity of



                                        [facility] 4.2.7.2.686         Rafiq

as



                                                        [phone]         [address]

 

        2021-10-29 2021-10-29 Technician         Lab, Ang - Db [facility]    1.2.840.1

14 [id number] 

Univers



        15:26:50 16:06:16 Visit           Jennifer Danika LXSN  350.1.13.10    

     ity of



                                                [place] 4.2.7.2.686         Rafiq

as



                                                SILVESTRE?BLEA [phone]         [address]



                                                MEDICAL                 



                                                OFFICE                  



                                                [facility]                 

 

        2021-10-29 2021-10-29 Technician         Lab, Ang - Db [facility]    1.2.840.1

14 [id number] 

Univers



        15:26:50 16:06:16 Visit           JenniferNasimaa HEALTH  350.1.13.10    

     ity of



                                                [place] 4.2.7.2.686         Rafiq

as



                                                SILVESTRE?BLEA [phone]         [address]



                                                MEDICAL                 



                                                OFFICE                  



                                                [facility]                 

 

        2021-10-29 2021-10-29 Outpatient R       JENNIFER  [facility]    [id number]

359 Univers



        16:00:00 16:00:00                 DANIKA                         ity [facility]

 

        2021-10-29 2021-10-29 Outpatient R       JENNIFER  [facility]    [id number]

359 Univers



        15:30:00 15:26:39                 DANIKA kessler [facility]

 

        2021-10-29 2021-10-29 Outpatient R       JENNIFER  [facility]    [id number]

359 Univers



        15:30:00 15:26:39                 DANIKA kessler [facility]

 

        2021-10-29 2021-10-29 Office          [facility]    1.2.840.114 [id number]

96 Univers



        14:29:29 15:26:39 Visit           Danika HEALTH  350.1.13.10         it

y of



                                                [place] 4.2.7.2.686         Rafiq

as



                                                SILVESTRE?BLEA [phone]         [facility]    [address]             [place]



                                                MEDICAL                 



                                                OFFICE                  



                                                BUILDING                 

 

        2021-10-29 2021-10-29 Outpatient R       CHRISTIANO  [facility]    [id number]

359 Univers



        13:30:00 14:23:06                 ROSARIOILEANA kessler [facility]

 

        2021-10-29 2021-10-29 Office          [facility]    1.2.840.114 [id number]

45 Univers



        13:15:08 14:23:06 Visit           Rosario  HEALTH  350.1.13.10         it

y of



                                                [place] 4.2.7.2.686         Rafiq

as



                                                SILVESTRE?BLEA [phone]         [facility]    220             [facility]                 



                                                OFFICE                  



                                                BUILDING                 

 

        2021-10-29 2021-10-29 Telephone         [facility]    1.2.131.271 [id number]

2722 Univers



        00:00:00 00:00:00                 Rosario  MULTISPEC 350.1.13.10         

ity of



                                                IALTY   4.2.7.2.686         [place]

[place]



                                                CENTER  [phone]         Medi

hilario



                                                AND LOUIE [address]             [place]



                                                DIABETES                 



                                                CLINIC                  

 

        2021-10-03 2021-10-03 Bela Velásquez [facility]    1.2.351.213 [id number]

3434 Univers



        00:00:00 00:00:00                 Jeff Pruitt 350.1.13.10         i

ty of



                                                [place] 4.2.7.2.686         [place]

[place]



                                                [place] [phone]         [facility]     220             [facility]                 

 

        2021 Inpatient EL      BRAD RomeroTO   ADMI    [id number]

91 [facility]



        12:41:00 05:58:00                 Son Jones      [place]



                                                                        [facility]



                                                                        dic



                                                                        Hospita



                                                                        l

 

        2021 Outpatient         BRAD RomeroCL   LABO    [id number]

034 [facility]



        17:11:00 17:11:00                 Son                 68      [facility]

 

        2021 Outpatient R       [facility]    [id number]

42307 Univers



        10:30:00 10:30:00                 JEFF kessler [facility]

 

        2021 Outpatient R       [facility]    [id number]

71455 Univers



        10:30:00 10:30:00                 JEFF kessler [facility]

 

        2021 [facility]    1.2.988.761 [id number]

1680 Univers



        00:00:00 00:00:00                 Jeff Pruitt 350.1.13.10         i

ty of



                                                [place] 4.2.7.2.686         [place]

[place]



                                                Professio [phone]         Me

dical



                                                nal     [address] [place] [address]                 

 

        2021 Ehsan         [facility]    1.2.840.114 [id number]

[id number] Univers



        00:00:00 00:00:00                 Jeff H [place] 350.1.13.10         i

ty of



                                                [place] 4.2.7.2.686         [place]

[place]



                                                Professio [phone]         Me

dical



                                                nal     [address] [place] [address]                 

 

        2021 Refill          [facility]    1.2.190.061 [id number]

3431 Univers



        00:00:00 00:00:00                 Jeff H [place] 350.1.13.10         i

ty of



                                                [place] 4.2.7.2.686         [place]

[place]



                                                Professio [phone]         Me

dical



                                                nal     [address] [place] [address]                 

 

        2021-07-15 2021-07-15 Refмария [facility]    1.2.974.292 4198

7906 Univers



        00:00:00 00:00:00                 Jeff H [place] 350.1.13.10         i

ty of



                                                [place] 4.2.7.2.686         [place]

[place]



                                                Professio [phone]         [facility]

[facility]     220             [facility]                 

 

        2021-07-15 2021-07-15 [facility]    1.2.893.865 [id number]

2982 Univers



        00:00:00 00:00:00                 Jeff H [place] 350.1.13.10         i

ty of



                                                [place] 4.2.7.2.686         [place]

[place]



                                                Professio [phone]         Me

[facility]     220             [facility]                 

 

        2021 [facility]    1.2.868.695 [id number]

6486 Univers



        00:00:00 00:00:00                 Jeff H [place] 350.1.13.10         i

ty of



                                                [place] 4.2.7.2.686         [place]

[place]



                                                Profess[place] [phone]         Me

dic[address]                 

 

        2021 [facility]    1.2.432.004 9306

2014 Univers



        00:00:00 00:00:00                 Jeff H [place] 350.1.13.10         i

ty of



                                                [place] 4.2.7.2.686         [place]

[place]



                                                Profess[place] [phone]         Me

[facility]     220             [facility]                 

 

        2021 [facility]    1.2.141.840 [id number]

7123 Univers



        00:00:00 00:00:00                 Jeff H [place] 350.1.13.10         i

ty of



                                                [place] 4.2.7.2.686         [place]

[place]



                                                Profess[place] [phone]         Me

[facility]ic[facility]     220             [facility]                 

 

        2021 Orders          Doctor  EM    1.2.840.114 [id number]

59 Univers



        00:00:00 00:00:00 Only            Unassigned, ELIDA   350.1.13.10       

  ity of



                                        [facility] 4.2.7.2.686         Rafiq

as



                                                        [phone]         [address]

 

        2021-06-10 2021-06-10 Telephone         [facility]    1.2.840.114 [id number]

[id number] Univers



        00:00:00 00:00:00                 Jeff H [place] 350.1.13.10         i

ty of



                                                [place] 4.2.7.2.686         [place]



                                                [place] [phone]         [address]                 

 

        2021 Telephone         [facility]    1.2.840.114 84

[id number] Univers



        00:00:00 00:00:00                 Jeff LOUIS [place] 350.1.13.10         i

ty of



                                                [place] 4.2.7.2.686         [place]



                                                Professio [phone]         [address]                 

 

        2021 Orders          Doctor  EM    1.2.840.114 [id number]

46 Univers



        00:00:00 00:00:00 Only            Unassigned, ELIDA   350.1.13.10       

  ity of



                                        [facility] 4.2.7.2.686         Rafiq

as



                                                        [phone]         [address]

 

        2021 Telephone         [facility]    1.2.840.114 84

[id number] Univers



        00:00:00 00:00:00                 Jeff LOUIS [place] 350.1.13.10         i

ty of



                                                [place] 4.2.7.2.686         [place]

[place]



                                                Professio [phone]         [address]                 

 

        2021 Telephone         [facility]    1.2.840.114 84

[id number] Univers



        00:00:00 00:00:00                 Jeff HERIBERTO [place] 350.1.13.10         i

ty of



                                                [place] 4.2.7.2.686         [place]

[place]



                                                Professio [phone]         [address]                 

 

        2021 [facility] R       [facility]    22140

[id number] Univers



        09:00:00 09:00:00                 JEFF kessler [facility]

 

        2021 Outpatient R       [facility]    04386

[id number] Univers



        11:00:00 11:00:00                 JEFF kessler [facility]

 

        2021 Outpatient         DARRELL Romero    [id number]

562 [facility]



        18:41:00 18:41:00                 Son                 11      [facility]

 

        2021 Outpatient         [facility] BRADWU   REFE    [id number]

572 [facility]



        18:40:00 18:40:00                 Son                 Chel      [facility]

 

        2021 Telephone         [facility]    1.2.840.114 83

[id number] 



        00:00:00 00:00:00                 Jeff Pruitt 350.1.13.10         



                                                [place] 4.2.7.2.686         



                                                Professio [phone]         



                                                [facility]     220             



                                                [facility]                 

 

        2021 Telephone         [facility]    1.2.840.114 83

[id number] [facility]



        00:00:00 00:00:00                 Jeff Pruitt 350.1.13.10         i

ty of



                                                [place] 4.2.7.2.686         [place]



                                                Profess[place] [phone]         Me

dical



                                                [facility]     220             [facility]                 

 

        2021 Technician         2, [facility]    1.2.840.114 

[id number] 



        08:26:55 08:41:55 Visit                   [place] 350.1.13.10         



                                                [place] 4.2.7.2.686         



                                                Professio [phone]         



                                                [address]                 

 

        2021 Technician         2, [facility]    1.2.840.114 

[id number] [facility]



        08:26:55 08:41:55 Visit           Jeff Velásquez 350.1.13.10

         ity of



                                                [place] 4.2.7.2.686         [place]

[place]



                                                Profess[place] [phone]         Me

dical



                                                [address]                 

 

        2021 Outpatient R       [facility]    82803

10142 [facility]



        08:00:00 08:00:00                 JEFF kessler [facility]

 

        2021 Telephone         [facility]    1.2.840.114 83

[id number] 



        00:00:00 00:00:00                 Jeff Pruitt 350.1.13.10         



                                                [place] 4.2.7.2.686         



                                                Professio [phone]         



                                                [address]                 

 

        2021 Telephone         [facility]    1.2.840.114 83

[id number] Univers



        00:00:00 00:00:00                 Jeff H [place] 350.1.13.10         i

ty of



                                                [place] 4.2.7.2.686         [place]

[place]



                                                Professio [phone]         Me

dical



                                                [address]                 

 

        2021-03-15 [facility]    1.2.257.552 2214

9509 



        00:00:00 00:00:00                 Jeff H [place] 350.1.13.10         



                                                [place] 4.2.7.2.686         



                                                Professio [phone]         



                                                [address]                 

 

        2021-03-15 2021-03-15 [facility]    1.2.827.280 [id number]

9509 Univers



        00:00:00 00:00:00                 Jeff H [place] 350.1.13.10         i

ty of



                                                [place] 4.2.7.2.686         [place]

[place]



                                                Professio [phone]         Me

dic[address]                 

 

        2021 Office          [facility]    1.2.029.203 0457

3414 



        12:03:01 13:19:59 Visit           Jeff H [place] 350.1.13.10         



                                                [place] 4.2.7.2.686         



                                                Professio [phone]         



                                                [address]                 

 

        2021 Office          [facility]    1.2.072.772 4837

3414 [facility]



        12:03:01 13:19:59 Visit           Jeff H [place] 350.1.13.10         i

ty of



                                                [place] 4.2.7.2.686         [place]

[place]



                                                Professio [phone]         Me

dic[address]                 

 

        2021 Outpatient R       [facility]    49276

89244 [facility]



        12:00:00 12:00:00                 JEFF kessler [facility]

 

        2021 Orders          Doctor STRICKLAND    1.2.840.114 [id number]

47 



        00:00:00 00:00:00 Only            Unassigned, ELIDA   350.1.13.10       

  



                                        [facility] 4.2.7.2.686         



                                                        [phone]         



                                                        2021 Orders          Doctor  EM    1.2.840.114 [id number]

47 Univers



        00:00:00 00:00:00 Only            Unassigned, ELIDA   350.1.13.10       

  ity of



                                        [facility] 4.2.7.2.686         Rafiq

as



                                                        [phone]         [address]

 

        2021 Outpatient R       YEIMI, [facility]    08527

52991 Univers



        13:20:00 13:20:00                 CHANNING                             ity [facility]

 

        2021 Telephone         [facility]    1.2.840.114 81

[id number] Univers



        00:00:00 00:00:00                 Jeff Pruitt 350.1.13.10         i

ty of



                                                [place] 4.2.7.2.686         [place]

[place]



                                                Profess[place] [phone]         [address]                 

 

        2021 Orders          Doctor  EM    1.2.840.114 [id number]

87 Univers



        00:00:00 00:00:00 Only            Unassigned, ELIDA   350.1.13.10       

  ity of



                                        [facility] 4.2.7.2.686         Rafiq

as



                                                        [phone]         [address]

 

        2021 Telephone         [facility]    1.2.840.114 81

[id number] 



        00:00:00 00:00:00                 Jeff Pruitt 350.1.13.10         



                                                [place] 4.2.7.2.686         



                                                Professio [phone]         



                                                [address]                 

 

        2021 Telephone         [facility]    1.2.840.114 81

[id number] Univers



        00:00:00 00:00:00                 Jeff Pruitt 350.1.13.10         i

ty of



                                                [place] 4.2.7.2.686         [place]

[place]



                                                Profess[place] [phone]         [address]                 

 

        2021 Technician         2, [facility]    1.2.840.114 

[id number] Univers



        09:03:37 09:18:37 Visit           Jeff Velásquez 350.1.13.10

         ity of



                                                [place] 4.2.7.2.686         [place]

[place]



                                                Profess[place] [phone]         Me

dical



                                                nal     353             [facility]                 

 

        2021 Outpatient R       [facility]    43808

[id number] Univers



        09:00:00 09:00:00                 JEFF                           ity of



                                                                        [facility]

 

        2021 Telephone         [facility]    1.2.840.114 81

[id number] Univers



        00:00:00 00:00:00                 Jeff Pruitt 350.1.13.10         i

ty of



                                                [place] 4.2.7.2.686         [place]

[place]



                                                Professio [phone]         Me

dical



                                                nal     220             [facility]                 

 

        2021 Refill          [facility]    1.2.730.038 5500

[id number] Univers



        00:00:00 00:00:00                 Jeff Pruitt 350.1.13.10         i

ty of



                                                [place] 4.2.7.2.686         [place]

[place]



                                                Profess[place] [phone]         Me

dical



                                                nal     220             [facility]                 

 

        2021 Telephone         [facility]    1.2.840.114 81

[id number] Univers



        00:00:00 00:00:00                 Jeff Pruitt 350.1.13.10         i

ty of



                                                [place] 4.2.7.2.686         [place]

[place]



                                                Profess[place] [phone]         Me

dical



                                                nal     220             [facility]                 

 

        2021 Telephone         [facility]    1.2.840.114 81

[id number] Univers



        00:00:00 00:00:00                 Jeff LOUIS MULTISPEC 350.1.13.10         

ity of



                                                [place]   4.2.7.2.686         [place]

[place]



                                                CENTER  [phone]         Medi

hilario



                                                [place] [address]             [place]



                                                DIABETES                 



                                                CLINIC                  

 

        2021-01-15 2021-01-15 Telephone         [facility]    1.2.840.114 80

[id number] Univers



        00:00:00 00:00:00                 Jeff Pruitt 350.1.13.10         i

ty of



                                                [place] 4.2.7.2.686         [place]

[place]



                                                Profess[place] [phone]         [address]                 

 

        2021 Outpatient         Stocks, HCACL   LABO    [id number]

415 [facility]



        17:52:00 17:52:00                 Son                 74      [facility]

 

        2021 Outpatient         Stocks, HCAWU   REFE    [id number]

966 [facility]



        16:26:00 16:26:00                 Osn                 50      [facility]

 

        2021 Outpatient         Stocks, HCATO   RADI    [id number]

451 [facility]



        11:30:00 11:30:00                 Son                 19      [place]



                                                                        [facility]



                                                                        dic



                                                                        Hospita



                                                                        

 

        2021 Outpatient R       [facility]    [id number]

[id number] Univers



        15:30:00 15:30:00                 JEFF kessler [facility]

 

        2020 Refill          [facility]    12.253.954 [id number]

1169 Univers



        00:00:00 00:00:00                 Jeff Pruitt 350.1.13.10         i

ty of



                                                [place] 4.2.7.2.686         [place]

[place]



                                                Professio [phone]         [address]                 

 

        2020-10-06 2020-10-06 Outpatient         Isabel[facility]   [facility]    [id number]

[id number] [facility]



        13:19:00 13:19:00                 German                   83      [facility]

 

        2020 Office          [facility]    1.2.289.390 [id number]

4790 Univers



        14:49:33 16:21:33 Visit           Jeff Pruitt 350.1.13.10         i

ty of



                                                [place] 4.2.7.2.686         [place]

[place]



                                                Professio [phone]         [address]                 

 

        2020 Outpatient R       [facility]    [id number]

46004 Univers



        15:30:00 15:30:00                 JEFF kessler [facility]

 

        2020 Outpatient R       DESTINEE [facility]    07317

[id number] Univers



        15:30:00 15:30:00                 JEFF kessler [facility]

 

        2020 Outpatient         Isabel, HCACL   LABO    [id number]

904 [facility]



        09:22:00 09:22:00                 German                   05      [facility]

 

        2020 Outpatient         Isabel, HCAWU   REFE    [id number]

100 [facility]



        20:59:00 20:59:00                 German                   22      [facility]

 

        2020 Outpatient         Isabel, HCACL   LABO    [id number]

892 [facility]



        18:50:00 18:50:00                 German                   50      [facility]

 

        2020 Outpatient         Isabel, HCAWH   SIST    [id number]

087 [facility]



        18:46:00 18:46:00                 German                   50      [facility]

s



                                                                        Hospita



                                                                        l of



                                                                        [place]

 

        2020 Outpatient         Isabel, HCATO   RADI    [id number]

887 [facility]



        14:00:00 14:00:00                 German                   73      [place]



                                                                        [facility]



                                                                        dic



                                                                        Hospita



                                                                        l

 

        2020 Office          [facility]    1.2.390.610 [id number]

5413 Univers



        11:03:35 12:09:00 Visit           Jeff Pruitt 350.1.13.10         i

ty of



                                                [place] 4.2.7.2.686         [place]

[place]



                                                Professio [phone]         Me

dical



                                                nal     220             [facility]                 

 

        2020 Outpatient R       DESTINEE [facility]    [id number]

21185 Univers



        11:00:00 11:00:00                 JEFF                           judtr [facility]

 

        2020 Orders          Doctor  EM    1.2.840.114 [id number]

21 Univers



        00:00:00 00:00:00 Only            Unassigned, ELIDA   350.1.13.10       

  ity of



                                        [facility] 4.2.7.2.686         Rafiq

as



                                                        [phone]         [address]

 

        2020 Telephone         [facility]    1.2.840.114 [id number]

[id number] Univers



        00:00:00 00:00:00                 Jeff Pruitt 350.1.13.10         i

ty of



                                                [place] 4.2.7.2.686         [place]

[place]



                                                Profess[place] [phone]         [address]                 

 

        2020-06-10 2020-06-10 Telephone         [facility]    1.2.840.114 76

[id number] Univers



        00:00:00 00:00:00                 Jeff Pruitt 350.1.13.10         i

ty of



                                                [place] 4.2.7.2.686         [place]

[place]



                                                Profess[place] [phone]         [address]                 

 

        2020 Outpatient         Stocks, HCACL   LABO    [id number]

936 [facility]



        18:37:00 18:37:00                 Son                 79      [facility]

 

        2020 Outpatient         Stocks, HCAWU   REFE    [id number]

523 [facility]



        15:07:00 15:07:00                 Son                 67      [facility]

 

        2020-04-10 2020-04-10 Telemedici         [facility]    1.2.840.114 [id number]

[id number] [facility]



        08:08:32 17:13:00 ne Visit         Jeff Pruitt 350.1.13.10         

ity of



                                                [place] 4.2.7.2.686         [place]

[place]



                                                Professio [phone]         [address]                 

 

        2020-04-10 2020-04-10 Outpatient R       [facility]    [id number]

10273 Univers



        13:00:00 13:00:00                 JEFF kessler of



                                                                        [facility]

 

        2019 Office          [facility]    1.2.561.762 [id number]

0071 [facility]



        10:12:34 11:26:43 Visit           Jeff Pruitt 350.1.13.10         i

ty of



                                                [place] 4.2.7.2.686         [place]

[place]



                                                Professio [phone]         [address]                 

 

        2019 Refill          [facility]    1.2.122.199 [id number]

2711 Univers



        00:00:00 00:00:00                 Jeff Pruitt 350.1.13.10         i

ty of



                                                [place] 4.2.7.2.686         [place]

[place]



                                                Professio [phone]         [address]                 

 

        2019 Refill          [facility]    1.2.463.243 [id number]

[id number] Univers



        00:00:00 00:00:00                 Jeff Pruitt 350.1.13.10         i

ty of



                                                [place] 4.2.7.2.686         [place]

[place]



                                                Professio [phone]         [address]                 

 

        2019 Telephone         [facility]    1.2.840.114 [id number]

[id number] Univers



        00:00:00 00:00:00                 Jeff Pruitt 350.1.13.10         i

ty of



                                                [place] 4.2.7.2.686         [place]

[place]



                                                Professio [phone]         [address]                 







Results







           Test Description Test Time  Test Comments Results    Result Comments 

Source









                    POCT HEMOGLOBIN A1C TEST 2023 17:42:00 









                      Test Item  Value      Reference Range Interpretation Comme

nts









             POCT HBA1C (test code = 4548-4) 6.4 %        4-6          A        

    

 

             Lab Interpretation (test code = 57983-9) Abnormal                  

             



[facility] HEMOGLOBIN A1C [id number] 17:42:00





             Test Item    Value        Reference Range Interpretation Comments

 

             POCT HBA1C (test code = 4548-4) 6.4 %        4-6          A        

    

 

             Lab Interpretation (test code = Abnormal                           

    



             01289-2)                                            



[facility] HEMOGLOBIN A1C [id number] 14:00:00





             Test Item    Value        Reference Range Interpretation Comments

 

             POCT HBA1C (test code = 4548-4) 7.1 %        4-6          A        

    

 

             Lab Interpretation (test code = Abnormal                           

    



             35875-1)                                            



[facility] HEMOGLOBIN A1C [id number] 14:00:00





             Test Item    Value        Reference Range Interpretation Comments

 

             POCT HBA1C (test code = 4548-4) 7.1 %        4-6          A        

    

 

             Lab Interpretation (test code = Abnormal                           

    



             26594-4)                                            



[facility]- XR KNEE 1 OR 2 V [id number] 12:18:00
************************************************************[facility] : 1960  Sex: 
M************************************************************ PatientName: 
AILEEN GARG Unit No: [id number] EXAMS: CPT CODE: [id number] XR KNEE 1 OR 2 V LT
 27311 IMAGES PROVIDED: 1 FINDINGS: Postoperative changes of left knee 
arthroplasty is demonstrated with the knee held in flexion. No acute fracture. 
IMPRESSION: Postoperative exam as above. ** Electronically Signed by DARBY Ford ** ** on 2021 at 1218 ** Reported and signed by: Natan Ford M.D.CC: Son Romero MD Technologist: SAMMY CARVAJAL (RT.R)  
Transcribed D/T: 2021 (7380) [facility] NAME: 
AILEEN GARG [address] [facility] [place] PHYS: Son Briggs MD : 1960 
AGE: 61 SEX: M [place] [address] ACCT NO: [id number] LOC: Y.DSU PHONE #: 
[phone] EXAM DATE: 2021 STATUS: REG [facility] [place] FAX #: [phone] RAD #: 
D/C DT PAGE 1  Signed Report Patient Name: AILEEN GARG Unit No: [id number] 
EXAMS: CPT CODE: [id number] XR KNEE 1 OR 2 V LT 54656 (Continued) Orig Print D/T:
 S: 2021 (1221)  [facility] NAME: AILEEN GARG [address] [facility] [place] PHYS: Son Briggs MD  : 1960 AGE: 61 SEX: M [place], [place] [address] ACCT NO: [id number] LOC: Y.DSU PHONE #: [phone] EXAM DATE: 
2021 STATUS: REG [facility] [place] FAX #: [phone] RAD #: D/C DT PAGE 2 Signed 
ReportSYNOVIAL FLD CELL [id number] 08:09:00





             Test Item    Value        Reference Range Interpretation Comments

 

             SYNOVIAL FLD BLOODY       LT. YELLOW   A            



             COLOR (test code                                        



             = COLSY)                                            

 

             SYNOVIAL FLD BLOODY       CLEAR                     



             APPEARANCE (test                                        



             code = APPSY)                                        

 

             SYNOVIAL FLD 3 mL                                   



             VOLUME (test code                                        



             = VOLSY)                                            

 

             SYNOVIAL FLD .000 /MM3 0-200        H            



             (test code =                                        



             WBCSY)                                              

 

             SYNOVIAL FLD RBC 39790.000    0-2          H            NOTE: An au

tomated



             (test code = /mm3                                   method is now b

eing used



             RBCSY)                                              to determinesyn

ovial



                                                                 fluid WBC and R

BC



                                                                 counts. The dif

ferential



                                                                 willstill be pe

rformed



                                                                 manually.

 

             SYNOVIAL FLD POLY 56 %         0-25         H            



             (test code =                                        



             POLYSY)                                             

 

             SYNOVIAL FLD 44 %         0-78         N            



             LYMPHOCYTE (test                                        



             code = LYMPHSY)                                        



[id number] 07:55:00





             Test Item    Value        Reference Range Interpretation Comments

 

             GLUBED (test code = GLUBED) 179 mg/dL           H            



[id number] 06:57:00





             Test Item    Value        Reference Range Interpretation Comments

 

             GLUBED (test code = GLUBED) 178 mg/dL           H            



Novel Coronavirus  [id number] 08:12:00





             Test Item    Value        Reference Range Interpretation Comments

 

             Novel Coronavirus Negative     Negative                  Positive r

esults are



             2019 Inhouse (test                                        indicativ

e of the presence



             code = COVNONPUI)                                        ofSARS-CoV

-2 RNA, clinical



                                                                 correlation wit

h patient



                                                                 historyand othe

r



                                                                 diagnostic info

rmation is



                                                                 necessary to



                                                                 determinepatien

t infection



                                                                 status. Positiv

e results



                                                                 do not rule out

bacterial



                                                                 infection or co

-infection



                                                                 with other viru

ses.



                                                                 Negative result

s do not



                                                                 preclude SARS-C

oV-2



                                                                 infection andsh

ould not be



                                                                 used as the sol

e basis for



                                                                 patient



                                                                 managementdecis

ions.



                                                                 Negative result

s must be



                                                                 combined with



                                                                 otherclinical



                                                                 observations, p

atient



                                                                 history, and



                                                                 epidemiological

information



                                                                 . Detection of 

SARS-CoV-2



                                                                 RNA may be affe

cted



                                                                 bysample collec

tion



                                                                 methods, storag

e



                                                                 conditions, and

/or stageof



                                                                 infection. [facility]

l RNA



                                                                 mutations, vacc

inations,



                                                                 antiviraltherap

eutics,



                                                                 antibiotics,



                                                                 chemotherapeuti

c



                                                                 orimmunosuppres

kevan drugs



                                                                 have not been e

valuated



                                                                 for effectson d

etection.



                                                                 Results are for

 the



                                                                 identification 

of



                                                                 SARS-CoV-2 RNA 

usingthe



                                                                 [place]  Sy

stem under



                                                                 the FDA Emergen

cy



                                                                 UseAuthorizatio

n. The



                                                                 testing is perf

ormed by



                                                                 personneltraine

d in the



                                                                 procedures for 

the [employer]



                                                                  molecular

diagnostic



                                                                 SARS-CoV-2 assa

y in vitro.



Novel Coronavirus  [id number] 08:11:00





             Test Item    Value        Reference Range Interpretation Comments

 

             Novel Coronavirus Negative     Negative                  Positive r

esults are



             2019 Inhouse (test                                        indicativ

e of the presence



             code = COVNONPUI)                                        ofSARS-CoV

-2 RNA, clinical



                                                                 correlation wit

h patient



                                                                 historyand othe

r



                                                                 diagnostic info

rmation is



                                                                 necessary to



                                                                 determinepatien

t infection



                                                                 status. Positiv

e results



                                                                 do not rule out

bacterial



                                                                 infection or co

-infection



                                                                 with other viru

ses.



                                                                 Negative result

s do not



                                                                 preclude SARS-C

oV-2



                                                                 infection andsh

ould not be



                                                                 used as the sol

e basis for



                                                                 patient



                                                                 managementdecis

ions.



                                                                 Negative result

s must be



                                                                 combined with



                                                                 otherclinical



                                                                 observations, p

atient



                                                                 history, and



                                                                 epidemiological

information



                                                                 . Detection of 

SARS-CoV-2



                                                                 RNA may be affe

cted



                                                                 bysample collec

tion



                                                                 methods, storag

e



                                                                 conditions, and

/or stageof



                                                                 infection. [facility]

l RNA



                                                                 mutations, vacc

inations,



                                                                 antiviraltherap

eutics,



                                                                 antibiotics,



                                                                 chemotherapeuti

c



                                                                 orimmunosuppres

kevan drugs



                                                                 have not been e

valuated



                                                                 for effectson d

etection.



                                                                 Results are for

 the



                                                                 identification 

of



                                                                 SARS-CoV-2 RNA 

usingthe



                                                                 [place]  Sy

stem under



                                                                 the FDA Emergen

cy



                                                                 UseAuthorizatio

n. The



                                                                 testing is perf

ormed by



                                                                 personneltraine

d in the



                                                                 procedures for 

the [employer]



                                                                  molecular

diagnostic



                                                                 SARS-CoV-2 assa

y in vitro.



[id number] 08:13:00





             Test Item    Value        Reference Range Interpretation Comments

 

             GLUBED (test code = GLUBED) 127 mg/dL           H            



- XR KNEE 1 OR 2 V [id number] 07:59:00
********************************[facility] 1960 Sex: 
M************************************************************ Patient Name: 
AILEEN GARG Unit No: [id number] EXAMS:  CPT CODE: [id number] XR KNEE 1 OR 2 V
 LT [address] IMAGES PROVIDED: 2 FINDINGS: Postoperative changes of right total knee
 arthroplasty partially demonstrated, with images centered at the tibial 
component. There is marked tibial periosteal thickening medially which may 
reflect sequela prior failed hardware. Chronic osteomyelitis and tumor are not 
completely excluded. IMPRESSION: Postoperative exam as above.  ** Electronically
 Signed by DARBY Ford ** ** on 2021 at 0759 ** Reported and 
signed by: Natan Ford M.D.  CC: Son Romero MD  Technologist: 
AFSANEH JIANG. RT(R) Transcribed D/T: 2021 (0759) [facility] NAME: AILEEN GARG [address] [facility] [place] PHYS: Son Briggs MD : 1960 AGE: 60 SEX: M [place], [place] [address]  ACCT NO: [id number] 
LOC: Y.506 A PHONE #: [phone] EXAM DATE: 2021 STATUS: DIS IN FAX #: 
[phone] RAD #: D/C DT 2021 PAGE 1 Signed Report Patient Name: 
AILEEN GARG Unit No: [id number] EXAMS:  CPT CODE: [id number] XR KNEE 1 OR 2 V
 [place] [address] (Continued) Orig Print D/T: S: 2021 (0802)  [facility] NAME: AILEEN GARG [address] [facility] [place] PHYS: Son Briggs MD
 : 1960 AGE: 60 SEX: M [place], [place] [address] ACCT NO: [id number] LOC: 
Y.Johnna A PHONE #: [phone] EXAM DATE: 2021 STATUS: DIS IN FAX #: 
[phone] RAD #: D/C DT 2021 PAGE 2 Signed ReportBASIC METABOLIC PANEL
2021 07:32:00





             Test Item    Value        Reference Range Interpretation Comments

 

             SODIUM (test code = 142 mmol/L   136-145      N            



             NA)                                                 

 

             POTASSIUM (test code = 4.7 mmol/L   3.5-5.1      N            



             K)                                                  

 

             CHLORIDE (test code = 107.0 mmol/L        N            



             CL)                                                 

 

             CARBON DIOXIDE (test 28.1 mmol/L  21-32        N            



             code = CO2)                                         

 

             GLUCOSE (test code = 129 mg/dL           H            



             GLU)                                                

 

             BLOOD UREA NITROGEN 20 mg/dL     7-18         H            



             (test code = BUN)                                        

 

             GLOMERULAR FILTRATION 68.5         >60                       Unit o

f measure:



             RATE (test code = GFR)                                        mL/mi

n/1.73



                                                                 [id number]



                                                                 Range:Healthy



                                                                 Adults  >90



                                                                 mL/min/1.73 m2 

For



                                                                 Chronic Kidney



                                                                 Disease: Stage 

II



                                                                 Mild Decrease i

n



                                                                 GFR 60-90 Stage

 III



                                                                 Moderate Decrea

se



                                                                 in GFR 30-59 St

age



                                                                 IV Severe Decre

ase



                                                                 in GFR  15-29 S

tage



                                                                 V Kidney Failur

e



                                                                 <15

 

             CREATININE (test code 1.10 mg/dL   0.55-1.30    N            



             = CREAT)                                            

 

             CALCIUM (test code = 7.6 mg/dL    8.2-10.1     L            



             CA)                                                 



SPECIMEN COMMENT: POD #[id number] 07:15:00





             Test Item    Value        Reference Range Interpretation Comments

 

             HEMOGLOBIN (test code = HGB) 13.7 g/dL    12-16        N           

 

 

             HEMATOCRIT (test code = HCT) 43.4 %       37-47        N           

 



SPECIMEN COMMENT: POD #[id number] 16:00:00





             Test Item    Value        Reference Range Interpretation Comments

 

             GLUBED (test code = GLUBED) 200 mg/dL           H            



VITAMIN D 25-HYDROXY (TOTAL)2021 09:44:00





             Test Item    Value        Reference Range Interpretation Comments

 

             VITAMIN D    30.1 ng/mL   30.0-100.0                Vitamin D defic

iency has



             25-HYDROXY (TOTAL)                                        been defi

neda by the



             (test code =                                        [facility]

icine and



             VITD25)                                             an Endocrine So

[facility]



                                                                 practice guidel

ine as



                                                                 alevel of serum

 25-OH



                                                                 vitamin D less 

than 20



                                                                 ng/mL (1,2).The

 Endocrine



                                                                 Society went on

 to



                                                                 further define 

vitamin



                                                                 Dinsufficiency 

as a level



                                                                 between 21 and 

29 ng/mL



                                                                 (2).1. IOM (Ins

titute of



                                                                 Medicine). 2010

. Dietary



                                                                 reference intak

es for



                                                                 calcium and D. 

[place]



                                                                 DC: [employer]

l



                                                                 [employer]

.2. Keven



                                                                 MF, Kailey DEL ANGEL,



                                                                 Annette WEIR, et



                                                                 al. Evaluation,



                                                                 treatment, and 

prevention



                                                                 of vitamin D de

ficiency:



                                                                 an Endocrine So

[facility]



                                                                 clinical practi

ce



                                                                 guideline. JCEM

. 2011;



                                                                 96(7):1911-30.P

erformed



                                                                 At:  [facility]



                                                                 [id number] [place], [place]



                                                                 [id number] 

Artie SHEIKH MD



                                                                 Ph:[phone]



GLYCOSYLATED HEMOGLOBIN (HA1C)2021 20:07:00





             Test Item    Value        Reference Range Interpretation Comments

 

             GLYCOSYLATED 7.3 %        4.8-5.9      H            Any condition t

hat shortens



             HEMOGLOBIN (HA1C)                                        erythocyte

 survival or



             (test code = GLYHGB)                                        decreas

esmean erythrocyte



                                                                 age (e.g., сергей

very from



                                                                 acute blood los

s,hemolytic



                                                                 anemia) will fa

lsely lower



                                                                 HGBA1c resultsr

egardless of



                                                                 the method used

. HGBA1c



                                                                 results from jm mack



                                                                 HbSS, HbCC, and

 HbSc must



                                                                 be interpreted 

with



                                                                 cautiongiven th

e



                                                                 pathological pr

ocesses,



                                                                 including anemi

a,increased



                                                                 red cell turnov

er,



                                                                 transfusion req

uirements,



                                                                 thatadversely i

mpact HGBA1c



                                                                 as a marker of 

long-term



                                                                 glycemiccontrol

.



                                                                 Alternative for

ms of



                                                                 testing such as



                                                                 fructosaminesho

uld be



                                                                 considered for 

these



                                                                 patients.



GLYCOSYLATED HEMOGLOBIN (HA1C)2021 20:07:00





             Test Item    Value        Reference Range Interpretation Comments

 

             GLYCOSYLATED 7.3 %        4.8-5.9      H            Any condition t

hat shortens



             HEMOGLOBIN (HA1C)                                        erythocyte

 survival or



             (test code = GLYHGB)                                        decreas

esmean erythrocyte



                                                                 age (e.g., сергей

very from



                                                                 acute blood los

s,hemolytic



                                                                 anemai) will fa

lsely lower



                                                                 HGBA1c resultsr

egardless of



                                                                 the method used

. HGBA1c



                                                                 results frompat

ients with



                                                                 HbSS, HbCC and 

HbSc must be



                                                                 interpreted wit

hcaution



                                                                 given the patho

logical



                                                                 processes, incl

uding



                                                                 anemia,increase

d red cell



                                                                 turnover, trans

fusion



                                                                 requirements, t

hatadversely



                                                                 impact HGBA1c a

s a marker



                                                                 of long-term



                                                                 glycemiccontrol

.



                                                                 Alternative for

ms of



                                                                 testing such as



                                                                 fructosaminesho

uld be



                                                                 considered for 

these



                                                                 patients.Any co

ndition that



                                                                 shortens erytho

cyte



                                                                 survival or dec

reasesmean



                                                                 erythrocyte age

 (e.g.,



                                                                 recovery from a

cute blood



                                                                 loss,hemolytic 

anemia) will



                                                                 falsely lower H

GBA1c



                                                                 resultsregardle

ss of the



                                                                 method used. HG

BA1c results



                                                                 from patientswi

th HbSS,



                                                                 HbCC, and HbSc 

must be



                                                                 interpreted wit

h



                                                                 cautiongiven th

e



                                                                 pathological pr

ocesses,



                                                                 including anemi

a,increased



                                                                 red cell turnov

er,



                                                                 transfusion req

uirements,



                                                                 thatadversely i

mpact HGBA1c



                                                                 as a marker of 

long-term



                                                                 glycemiccontrol

.



                                                                 Alternative for

ms of



                                                                 testing such as



                                                                 fructosaminesho

uld be



                                                                 considered for 

these



                                                                 patients.DONE A

T: [facility]



                                                                 [id number] [facility],



                                                                 [place], 

83



PROTHROMBIN [id number] 19:10:00





             Test Item    Value        Reference Range Interpretation Comments

 

             PROTHROMBIN TIME 11.2 secs    10.1-12.5    N            



             PATIENT (test code =                                        



             PTP)                                                

 

             INTERNATIONAL NORMAL 0.98         <2.0                      RECOMME

NDED THERAPEUTIC



             RATIO (test code =                                        RANGE FOR

 ORAL



             INR)                                                ANTICOAGULANTTR

EATMENT:



                                                                 CONDITION INRPr

ophylaxis



                                                                 of venous throm

bosis in



                                                                 2.0 - 3.0 high-

risk



                                                                 medical or surg

ical



                                                                 patientsTreatme

nt of



                                                                 venous thrombos

is 2.0 -



                                                                 3.0Prevention o

f



                                                                 embolism 2.0 -



                                                                 3.0Prevention o

f



                                                                 recurrent embol

ism, or



                                                                 3.0 - 4.5 patie

nts with



                                                                 mechanical pros

thetic



                                                                 intravascular v

varela



IS PATIENT ON ANTICOAGULANTS ? NHas Lab been notified if Patient is on Heparin 
Drip? NOTHROMBOPLASTIN TIME [id number] 19:10:00





             Test Item    Value        Reference Range Interpretation Comments

 

             PTT ACTIVATED (test code = APTT) 34.0 secs    24.9-37.0    N       

     



IS PATIENT ON ANTICOAGULANTS ? NHas Lab been notified if Patient is on Heparin 
Drip? NOCBC W/AUTO [id number] 19:09:00





             Test Item    Value        Reference Range Interpretation Comments

 

             WHITE BLOOD CELL (test code = WBC) 10.4 K/mm3   5.7-10.5     N     

       

 

             RED BLOOD CELL (test code = RBC) 5.55 M/mm3   4.2-5.4      H       

     

 

             HEMOGLOBIN (test code = HGB) 16.5 g/dL    12-16        H           

 

 

             HEMATOCRIT (test code = HCT) 50.8 %       37-47        H           

 

 

             MEAN CELL VOLUME (test code = MCV) 92 fL        80-98        N     

       

 

             MEAN CELL HGB (test code = MCH) 29.7 pg      27-34        N        

    

 

             MEAN CELL HGB CONCENTRATION (test 32.5 g/dL    30.8-34.1    N      

      



             code = MCHC)                                        

 

             RED CELL DISTRIBUTION WIDTH (test 14.1 %       11-16        N      

      



             code = RDW)                                         

 

             PLT (test code = PLT) 271 K/mm3    130-400      N            

 

             MEAN PLATELET VOLUME (test code = 10.2 fL      8.9-12.1     N      

      



             MPV)                                                

 

             NEUTROPHIL % (test code = NT%) 72.7 %       45-70        H         

   

 

             LYMPHOCYTE % (test code = LY%) 18.0 %       20-40        L         

   

 

             MONOCYTE % (test code = MO%) 7.0 %        3-10         N           

 

 

             EOSINOPHIL % (test code = EO%) 1.4 %        1-5          N         

   

 

             BASOPHIL % (test code = BA%) 0.5 %        0.0-1.1      N           

 

 

             NEUTROPHIL # (test code = NT#) 7.53 K/mm3   2.00-7.50    H         

   

 

             LYMPHOCYTE # (test code = LY#) 1.86 K/mm3   1.50-4.00    N         

   

 

             MONOCYTE # (test code = MO#) 0.73 K/mm3   0.2-0.8      N           

 

 

             EOSINOPHIL # (test code = EO#) 0.15 K/mm3   0.04-0.4     N         

   

 

             BASOPHIL # (test code = BA#) 0.05 K/mm3   0.02-0.10    N           

 

 

             MANUAL DIFF REQUIRED (test code = NO           MANUAL DIFF         

      



             MDIFF)                                              

 

             NUCLEATED RED BLOOD CELL (test 0 %          0-0          N         

   



             code = NRBC)                                        



COVID 19 Asymptomatic IH [id number] 18:32:00





             Test Item    Value        Reference Range Interpretation Comments

 

             COVID 19 Asymptomatic IH AG (test NEGATIVE     NEGATIVE            

      



             code = COVNONPUIAG)                                        



URINALYSIS [id number] 18:20:00





             Test Item    Value        Reference Range Interpretation Comments

 

             UA COLOR (test code = YELLOW       YELLOW                    



             COLU)                                               

 

             UA APPEARANCE (test CLEAR        CLEAR                     



             code = APPU)                                        

 

             UA GLUCOSE DIPSTICK 3+           NEGATIVE     A            



             (test code = DGLUU)                                        

 

             UA BILIRUBIN DIPSTICK NEGATIVE     NEGATIVE                  



             (test code = BILU)                                        

 

             UA KETONE DIPSTICK NEGATIVE mg/dL NEGATIVE                  



             (test code = KETU)                                        

 

             UA SPECIFIC GRAVITY >= 1.030     1.003-1.035               



             (test code = SGU)                                        

 

             UA BLOOD DIPSTICK NEGATIVE     NEGATIVE                  



             (test code = JAGUAR)                                        

 

             UA PH DIPSTICK (test 5.5          See_Comment                [Autom

ated



             code = KORIN)                                         message] The



                                                                 system which



                                                                 generated this



                                                                 result transmit

argenis



                                                                 reference range

:



                                                                 6.5. The refere

nce



                                                                 range was not u

sed



                                                                 to interpret th

is



                                                                 result as



                                                                 normal/abnormal

.

 

             UA PROTEIN DIPSTICK NEGATIVE mg/dL NEG                       



             (test code = PROU)                                        

 

             UA UROBILINIOGEN 0.2 mg/dL    NORM                      



             DIPSTICK (test code =                                        



             URO)                                                

 

             UA NITRITE DIPSTICK NEGATIVE     NEG                       



             (test code = JAVIER)                                        

 

             UA LEUKOCYTE ESTERASE NEGATIVE     NEGATIVE                  



             DIPSTICK (test code =                                        



             LEUU)                                               

 

             UA WBC (test code = <5 /HPF      0-2                       



             WBCU)                                               

 

             UA RBC (test code = 0-2 /HPF     0-2                       



             RBCU)                                               

 

             UA EPITHELIAL CELLS RARE /HPF    0-2                       



             (test code = EPIU)                                        

 

             UA BACTERIA (test RARE /HPF    NONE                      



             code = BACU)                                        



COMPREHENSIVE METABOLIC [id number] 18:06:00





             Test Item    Value        Reference Range Interpretation Comments

 

             SODIUM (test code = 144 mmol/L   136-145      N            



             NA)                                                 

 

             POTASSIUM (test code = 5.0 mmol/L   3.5-5.1      N            



             K)                                                  

 

             CHLORIDE (test code = 106.0 mmol/L        N            



             CL)                                                 

 

             CARBON DIOXIDE (test 28.4 mmol/L  21-32        N            



             code = CO2)                                         

 

             GLUCOSE (test code = 121 mg/dL           H            



             GLU)                                                

 

             BLOOD UREA NITROGEN 20 mg/dL     7-18         H            



             (test code = BUN)                                        

 

             GLOMERULAR FILTRATION 63.2         >60                       Unit o

f measure:



             RATE (test code = GFR)                                        mL/mi

n/1.73



                                                                 d2Tnuaiyndf



                                                                 Range:Healthy



                                                                 Adults >90



                                                                 mL/min/1.73 m2 

For



                                                                 Chronic Kidney



                                                                 Disease: Stage 

II



                                                                 Mild Decrease i

n



                                                                 GFR 60-90 Stage

 III



                                                                 Moderate Decrea

se



                                                                 in GFR 30-59 St

age



                                                                 IV Severe Decre

ase



                                                                 in GFR 15-29 St

age



                                                                 V Kidney Failur

e



                                                                 <15

 

             CREATININE (test code 1.18 mg/dL   0.55-1.30    N            



             = CREAT)                                            

 

             TOTAL PROTEIN (test 7.0 g/dL     6.4-8.2      N            



             code = PROT)                                        

 

             ALBUMIN (test code = 3.6 g/dL     3.4-5.0      N            



             ALB)                                                

 

             GLOBULIN (test code = 3.4 g/dL     2.2-4.2      N            



             GLOB)                                               

 

             ALBUMIN/GLOBULIN RATIO 1.1          0.7-2.0      N            



             (test code = A/G)                                        

 

             CALCIUM (test code = 9.2 mg/dL    8.2-10.1     N            



             CA)                                                 

 

             BILIRUBIN TOTAL (test 0.30 mg/dL   0.2-1.00     N            



             code = BILT)                                        

 

             SGOT/AST (test code = 15.0 U/L     15-37        N            



             AST)                                                

 

             SGPT/ALT (test code = 33.0 U/L     12-78        N            Please

 note new



             ALT)                                                normal range.

 

             ALKALINE PHOSPHATASE 113 U/L             N            



             TOTAL (test code =                                        



             ALKP)                                               



Novel Coronavirus 2019 Inhouse2021-01-15 17:03:00





             Test Item    Value        Reference Range Interpretation Comments

 

             Novel Coronavirus Negative     Negative                  Positive r

esults are



             2019 Inhouse (test                                        indicativ

e of the presence



             code = COVNONPUI)                                        ofSARS-CoV

-2 RNA, clinical



                                                                 correlation wit

h patient



                                                                 historyand othe

r



                                                                 diagnostic info

rmation is



                                                                 necessary to



                                                                 determinepatien

t infection



                                                                 status. Positiv

e results



                                                                 do not rule out

bacterial



                                                                 infection or co

-infection



                                                                 with other viru

ses.



                                                                 Negative result

s do not



                                                                 preclude SARS-C

oV-2



                                                                 infection andsh

ould not be



                                                                 used as the sol

e basis for



                                                                 patient



                                                                 managementdecis

ions.



                                                                 Negative result

s must be



                                                                 combined with



                                                                 otherclinical



                                                                 observations, p

atient



                                                                 history, and



                                                                 epidemiological

information



                                                                 . Detection of 

SARS-CoV-2



                                                                 RNA may be affe

cted



                                                                 bysample collec

tion



                                                                 methods, storag

e



                                                                 conditions, and

/or stageof



                                                                 infection. [facility]

l RNA



                                                                 mutations, vacc

inations,



                                                                 antiviraltherap

eutics,



                                                                 antibiotics,



                                                                 chemotherapeuti

c



                                                                 orimmunosuppres

kevan drugs



                                                                 have not been e

valuated



                                                                 for effectson d

etection.



                                                                 Results are for

 the



                                                                 identification 

of



                                                                 SARS-CoV-2 RNA 

usingthe



                                                                 [place]  [place]

stem under



                                                                 the FDA Emergen

cy



                                                                 UseAuthorizatio

n. The



                                                                 testing is perf

ormed by



                                                                 personneltraine

d in the



                                                                 procedures for 

the [employer]



                                                                  molecular

diagnostic



                                                                 SARS-CoV-2 assa

y in vitro.



SPECIMEN COMMENT: NNovel Coronavirus 2019 Inhouse2021-01-15 17:03:00





             Test Item    Value        Reference Range Interpretation Comments

 

             Novel Coronavirus Negative     Negative                  Positive r

esults are



             2019 Inhouse (test                                        indicativ

e of the presence



             code = COVNONPUI)                                        ofSARS-CoV

-2 RNA, clinical



                                                                 correlation wit

h patient



                                                                 historyand othe

r



                                                                 diagnostic info

rmation is



                                                                 necessary to



                                                                 determinepatien

t infection



                                                                 status. Positiv

e results



                                                                 do not rule out

bacterial



                                                                 infection or co

-infection



                                                                 with other viru

ses.



                                                                 Negative result

s do not



                                                                 preclude SARS-C

oV-2



                                                                 infection andsh

ould not be



                                                                 used as the sol

e basis for



                                                                 patient



                                                                 managementdecis

ions.



                                                                 Negative result

s must be



                                                                 combined with



                                                                 otherclinical



                                                                 observations, p

atient



                                                                 history, and



                                                                 epidemiological

information



                                                                 . Detection of 

SARS-CoV-2



                                                                 RNA may be affe

cted



                                                                 bysample collec

tion



                                                                 methods, storag

e



                                                                 conditions, and

/or stageof



                                                                 infection. [facility]

l RNA



                                                                 mutations, vacc

inations,



                                                                 antiviraltherap

eutics,



                                                                 antibiotics,



                                                                 chemotherapeuti

c



                                                                 orimmunosuppres

kevan drugs



                                                                 have not been e

valuated



                                                                 for effectson d

etection.



                                                                 Results are for

 the



                                                                 identification 

of



                                                                 SARS-CoV-2 RNA 

usingthe



                                                                 [place]  [place]

stem under



                                                                 the FDA Emergen

cy



                                                                 UseAuthorizatio

n. The



                                                                 testing is perf

ormed by



                                                                 personneltraine

d in the



                                                                 procedures for 

the [employer]



                                                                  molecular

diagnostic



                                                                 SARS-CoV-2 assa

y in vitro.



SPECIMEN COMMENT: N- XR FLUORO NDL2021-01-15 08:20:00
************************************************************[facility] : 1960 Sex: 
M************************************************************ Patient Name: 
AILEEN GARG Unit No: [id number] EXAMS: CPT CODE: [id number] XR FLUORO NDL 
88073 FLUOROSCOPICALLY GUIDED left prosthetic knee joint ASPIRATION COMMENT: 
After informed consent was obtained a 22-gauge needle is inserted into left 
prosthetic knee joint under fluoroscopic control using sterile technique. 3 mL 
of fluid was aspirated after multiple attempts. This is sent to the lab for 
analysis. The patient tolerated the procedure well. 0.5 minutes of fluoroscopy 
time was used on this exam. ** Electronically Signed by AMI Bishop MD on 
01/15/2021 at 0820 **  Reported and signed by: AMI Bishop MD CC: Son Romero MD  Technologist: RT Vickie.(R) Transcribed D/T: 01/15/2021 (0820) 
AmariGVG  [facility] NAME: AILEEN GARG [address] [facility] [place] PHYS:
Koki Briggs MD  : 1960 AGE: 60 SEX: M [place] [address] 
ACCT NO: [id number] LOC: Y.RAD PHONE #: [phone] EXAM DATE: 2021 
STATUS: DEP CLI  FAX #: [phone] RAD #: D/C DT PAGE 1 Signed Report Patient 
Name: AILEEN GARG  Unit No: [id number] EXAMS: CPT CODE: [id number] XR FLUORO 
NDL 45141 (Continued) Orig Print D/T: S: 01/15/2021 (0823) [facility] NAME: AILEEN GARG [address] [facility] [place] PHYS: Son Briggs MD
: 1960 AGE: 60 SEX: M [place], [place] [address]RAYT NO: [id number] LOC: 
Y.RAD PHONE #: [phone] EXAM DATE: 2021 STATUS: DEP CLI FAX #: [phone]
[phone] RAD #: D/C DT  PAGE 2 Signed ReportVITAMIN D 25-HYDROXY (TOTAL)
2021-01-15 06:37:00





             Test Item    Value        Reference Range Interpretation Comments

 

             VITAMIN D    14.3 ng/mL   30.0-100.0   L            Vitamin D defic

iency has



             25-HYDROXY (TOTAL)                                        been defi

neda by the



             (test code =                                        [facility]

icine and



             VITD25)                                             an Endocrine So

[facility]



                                                                 practice guidel

ine as



                                                                 alevel of serum

 25-OH



                                                                 vitamin D less 

than 20



                                                                 ng/mL (1,2).The

 Endocrine



                                                                 Society went on

 to



                                                                 further define 

vitamin



                                                                 Dinsufficiency 

as a level



                                                                 between 21 and 

29 ng/mL



                                                                 (2).1. IOM (Ins

titute of



                                                                 Medicine). 2010

. Dietary



                                                                 reference intak

es for



                                                                 calcium and D. 

[place]



                                                                 DC: [employer]

l



                                                                 [employer]

.2. Keven



                                                                 MF, Kailey DEL ANGEL,



                                                                 Annette

i WEIR, et



                                                                 al. Evaluation,



                                                                 treatment, and 

prevention



                                                                 of vitamin D de

ficiency:



                                                                 an Endocrine So

[facility]



                                                                 clinical practi

ce



                                                                 guideline. JCEM

. 2011;



                                                                 96(7):1911-30.P

erformed



                                                                 At:  [facility]



                                                                 [id number]

[place], [place]



                                                                 [id number] 

Kyle L MD



                                                                 Ph:[phone]



GLYCOSYLATED HEMOGLOBIN (HA1C)2021 18:45:00





             Test Item    Value        Reference Range Interpretation Comments

 

             GLYCOSYLATED 9.1 %        4.8-5.9      H            Any condition t

hat shortens



             HEMOGLOBIN (HA1C)                                        erythocyte

 survival or



             (test code = GLYHGB)                                        decreas

esmean erythrocyte



                                                                 age (e.g., сергей

very from



                                                                 acute blood los

s,hemolytic



                                                                 anemia) will fa

lsely lower



                                                                 HGBA1c resultsr

egardless of



                                                                 the method used

. HGBA1c



                                                                 results from jm mack



                                                                 HbSS, HbCC, and

 HbSc must



                                                                 be interpreted 

with



                                                                 cautiongiven th

e



                                                                 pathological pr

ocesses,



                                                                 including anemi

a,increased



                                                                 red cell turnov

er,



                                                                 transfusion req

uirements,



                                                                 thatadversely i

mpact HGBA1c



                                                                 as a marker of 

long-term



                                                                 glycemiccontrol

.



                                                                 Alternative for

ms of



                                                                 testing such as



                                                                 fructosaminesho

uld be



                                                                 considered for 

these



                                                                 patients.



GLYCOSYLATED HEMOGLOBIN (HA1C)2021 18:45:00





             Test Item    Value        Reference Range Interpretation Comments

 

             GLYCOSYLATED 9.1 %        4.8-5.9      H            Any condition t

hat shortens



             HEMOGLOBIN (HA1C)                                        erythocyte

 survival or



             (test code = GLYHGB)                                        decreas

esmean erythrocyte



                                                                 age (e.g., сергей

very from



                                                                 acute blood los

s,hemolytic



                                                                 anemai) will fa

lsely lower



                                                                 HGBA1c resultsr

egardless of



                                                                 the method used

. HGBA1c



                                                                 results frompat

ients with



                                                                 HbSS, HbCC and 

HbSc must be



                                                                 interpreted wit

hcaution



                                                                 given the patho

logical



                                                                 processes, incl

uding



                                                                 anemia,increase

d red cell



                                                                 turnover, trans

fusion



                                                                 requirements, t

hatadversely



                                                                 impact HGBA1c a

s a marker



                                                                 of long-term



                                                                 glycemiccontrol

.



                                                                 Alternative for

ms of



                                                                 testing such as



                                                                 fructosaminesho

uld be



                                                                 considered for 

these



                                                                 patients.Any co

ndition that



                                                                 shortens erytho

cyte



                                                                 survival or dec

reasesmean



                                                                 erythrocyte age

 (e.g.,



                                                                 recovery from a

cute blood



                                                                 loss,hemolytic 

anemia) will



                                                                 falsely lower H

GBA1c



                                                                 resultsregardle

ss of the



                                                                 method used. HG

BA1c results



                                                                 from patientswi

th HbSS,



                                                                 HbCC, and HbSc 

must be



                                                                 interpreted wit

h



                                                                 cautiongiven th

e



                                                                 pathological pr

ocesses,



                                                                 including anemi

a,increased



                                                                 red cell turnov

er,



                                                                 transfusion req

uirements,



                                                                 thatadversely i

mpact HGBA1c



                                                                 as a marker of 

long-term



                                                                 glycemiccontrol

.



                                                                 Alternative for

ms of



                                                                 testing such as



                                                                 fructosaminesho

uld be



                                                                 considered for 

these



                                                                 patients.DONE A

T: [facility]



                                                                 [address] [facility],



                                                                 [place], 

83



SYNOVIAL FLD CELL [id number] 13:38:00





             Test Item    Value        Reference Range Interpretation Comments

 

             SYNOVIAL FLD COLOR (test code = COLSY) BLOODY       LT. YELLOW   A 

           

 

             SYNOVIAL FLD APPEARANCE (test code = BLOODY       CLEAR            

         



             APPSY)                                              

 

             SYNOVIAL FLD VOLUME (test code = 0.5 mL                            

     



             VOLSY)                                              

 

             SYNOVIAL FLD WBC (test code = WBCSY)  /MM3        0-200            

         

 

             SYNOVIAL FLD RBC (test code = RBCSY)  /mm3        0-2              

         

 

             SYNOVIAL FLD POLY (test code = POLYSY) 62 %         0-25         H 

           

 

             SYNOVIAL FLD LYMPHOCYTE (test code = 33 %         0-78         N   

         



             LYMPHSY)                                            

 

             SYNOVIAL FLD MONOCYTE (test code = 5 %          0-71         N     

       



             MONOSY)                                             



SPECIMEN COMMENT: ASPIRATION LEFT KNEE DONE BY DR DE JESUS FLD CELL CT/DIFF
2021 13:38:00





             Test Item    Value        Reference Range Interpretation Comments

 

             SYNOVIAL FLD BLOODY       LT. YELLOW   A            



             COLOR (test code                                        



             = COLSY)                                            

 

             SYNOVIAL FLD BLOODY       CLEAR                     



             APPEARANCE (test                                        



             code = APPSY)                                        

 

             SYNOVIAL FLD 0.5 mL                                 



             VOLUME (test code                                        



             = VOLSY)                                            

 

             SYNOVIAL FLD .000 /MM3 0-200        H            



             (test code =                                        



             WBCSY)                                              

 

             SYNOVIAL FLD RBC 262035.000   0-2          H            NOTE: An au

tomated



             (test code = /mm3                                   method is now b

eing used



             RBCSY)                                              to determinesyn

ovial



                                                                 fluid WBC and R

BC



                                                                 counts. The dif

ferential



                                                                 willstill be pe

rformed



                                                                 manually.

 

             SYNOVIAL FLD POLY 62 %         0-25         H            



             (test code =                                        



             POLYSY)                                             

 

             SYNOVIAL FLD 33 %         0-78         N            



             LYMPHOCYTE (test                                        



             code = LYMPHSY)                                        

 

             SYNOVIAL FLD 5 %          0-71         N            



             MONOCYTE (test                                        



             code = MONOSY)                                        



SPECIMEN COMMENT: ASPIRATION LEFT KNEE DONE BY DR BISHOPPROTHROMBIN TIME
2021 12:43:00





             Test Item    Value        Reference Range Interpretation Comments

 

             PROTHROMBIN TIME 11.2 secs    10.1-12.5    N            



             PATIENT (test code =                                        



             PTP)                                                

 

             INTERNATIONAL NORMAL 0.98         <2.0                      RECOMME

NDED THERAPEUTIC



             RATIO (test code =                                        RANGE FOR

 ORAL



             INR)                                                ANTICOAGULANTTR

EATMENT:



                                                                 CONDITION INRPr

ophylaxis



                                                                 of venous throm

bosis in



                                                                 2.0 - 3.0 high-

risk



                                                                 medical or surg

ical



                                                                 patientsTreatme

nt of



                                                                 venous thrombos

is  2.0 -



                                                                 3.0Prevention o

f



                                                                 embolism 2.0 -



                                                                 3.0Prevention o

f



                                                                 recurrent embol

ism, or



                                                                 3.0 - 4.5 patie

nts with



                                                                 mechanical pros

thetic



                                                                 intravascular v

varela



IS PATIENT ON ANTICOAGULANTS ? YLIST ANTICOAGULANT/ANTI PLT MEDICATION : 
AspirinHas Lab been notified if Patient is on Heparin Drip? NOIf Yes, order CBC,
OCCULT BLOOD, PT every other day NTHROMBOPLASTIN TIME [id number] 12:43:00





             Test Item    Value        Reference Range Interpretation Comments

 

             PTT ACTIVATED (test code = APTT) 33.8 secs    24.9-37.0    N       

     



IS PATIENT ON ANTICOAGULANTS ? YLIST ANTICOAGULANT/ANTI PLT MEDICATION : 
AspirinHas Lab been notified if Patient is on Heparin Drip? NOIf Yes, order CBC,
OCCULT BLOOD, PT every other day NCOMPREHENSIVE METABOLIC [id number] 
11:07:00





             Test Item    Value        Reference Range Interpretation Comments

 

             SODIUM (test code = 141 mmol/L   136-145      N            



             NA)                                                 

 

             POTASSIUM (test code = 3.9 mmol/L   3.5-5.1      N            



             K)                                                  

 

             CHLORIDE (test code = 106.0 mmol/L        N            



             CL)                                                 

 

             CARBON DIOXIDE (test 23.7 mmol/L  21-32        N            



             code = CO2)                                         

 

             GLUCOSE (test code = 118 mg/dL           H            



             GLU)                                                

 

             BLOOD UREA NITROGEN 13 mg/dL     7-18         N            



             (test code = BUN)                                        

 

             GLOMERULAR FILTRATION 84.2         >60                       Unit o

f measure:



             RATE (test code = GFR)                                        mL/mi

n/1.73



                                                                 [id number]



                                                                 Range:Healthy



                                                                 Adults >90



                                                                 mL/min/1.73 m2 

For



                                                                 Chronic Kidney



                                                                 Disease: Stage 

II



                                                                 Mild Decrease i

n



                                                                 GFR 60-90 Stage

 III



                                                                 Moderate Decrea

se



                                                                 in GFR 30-59 St

age



                                                                 IV Severe Decre

ase



                                                                 in GFR 15-29 St

age



                                                                 V Kidney Failur

e



                                                                 <15

 

             CREATININE (test code 0.92 mg/dL   0.55-1.30    N            



             = CREAT)                                            

 

             TOTAL PROTEIN (test 7.0 g/dL     6.4-8.2      N            



             code = PROT)                                        

 

             ALBUMIN (test code = 3.6 g/dL     3.4-5.0      N            



             ALB)                                                

 

             GLOBULIN (test code = 3.4 g/dL     2.2-4.2      N            



             GLOB)                                               

 

             ALBUMIN/GLOBULIN RATIO 1.1          0.7-2.0      N            



             (test code = A/G)                                        

 

             CALCIUM (test code = 8.4 mg/dL    8.2-10.1     N            



             CA)                                                 

 

             BILIRUBIN TOTAL (test 0.30 mg/dL   0.2-1.00     N            



             code = BILT)                                        

 

             SGOT/AST (test code = 11.0 U/L     15-37        L            



             AST)                                                

 

             SGPT/ALT (test code = 17.0 U/L     12-78        N            Please

 note new



             ALT)                                                normal range.

 

             ALKALINE PHOSPHATASE 99 U/L              N            



             TOTAL (test code =                                        



             ALKP)                                               



CBC W/AUTO [id number] 10:34:00





             Test Item    Value        Reference Range Interpretation Comments

 

             WHITE BLOOD CELL (test code = WBC) 9.8 K/mm3    5.7-10.5     N     

       

 

             RED BLOOD CELL (test code = RBC) 5.38 M/mm3   4.2-5.4      N       

     

 

             HEMOGLOBIN (test code = HGB) 15.3 g/dL    12-16        N           

 

 

             HEMATOCRIT (test code = HCT) 46.4 %       37-47        N           

 

 

             MEAN CELL VOLUME (test code = MCV) 86 fL        80-98        N     

       

 

             MEAN CELL HGB (test code = MCH) 28.4 pg      27-34        N        

    

 

             MEAN CELL HGB CONCENTRATION (test 33.0 g/dL    30.8-34.1    N      

      



             code = MCHC)                                        

 

             RED CELL DISTRIBUTION WIDTH (test 14.5 %       11-16        N      

      



             code = RDW)                                         

 

             PLT (test code = PLT) 254 K/mm3    130-400      N            

 

             MEAN PLATELET VOLUME (test code = 10.2 fL      8.9-12.1     N      

      



             MPV)                                                

 

             NEUTROPHIL % (test code = NT%) 70.2 %       45-70        H         

   

 

             LYMPHOCYTE % (test code = LY%) 20.7 %       20-40        N         

   

 

             MONOCYTE % (test code = MO%) 6.8 %        3-10         N           

 

 

             EOSINOPHIL % (test code = EO%) 1.4 %        1-5          N         

   

 

             BASOPHIL % (test code = BA%) 0.5 %        0.0-1.1      N           

 

 

             NEUTROPHIL # (test code = NT#) 6.86 K/mm3   2.00-7.50    N         

   

 

             LYMPHOCYTE # (test code = LY#) 2.03 K/mm3   1.50-4.00    N         

   

 

             MONOCYTE # (test code = MO#) 0.67 K/mm3   0.2-0.8      N           

 

 

             EOSINOPHIL # (test code = EO#) 0.14 K/mm3   0.04-0.4     N         

   

 

             BASOPHIL # (test code = BA#) 0.05 K/mm3   0.02-0.10    N           

 

 

             MANUAL DIFF REQUIRED (test code = NO           MANUAL DIFF         

      



             MDIFF)                                              

 

             NUCLEATED RED BLOOD CELL (test 0 %          0-0          N         

   



             code = NRBC)                                        



GLUBED2020-10-12 09:16:00





             Test Item    Value        Reference Range Interpretation Comments

 

             GLUBED (test code = GLUBED) 131 mg/dL           H            



GLUBED2020-10-08 08:06:00





             Test Item    Value        Reference Range Interpretation Comments

 

             GLUBED (test code = GLUBED) 298 mg/dL           H            



Novel Coronavirus  Inhouse2020-10-07 15:48:00





             Test Item    Value        Reference Range Interpretation Comments

 

             Novel Coronavirus Negative     Negative                  Positive r

esults are



             2019 Inhouse (test                                        indicativ

e of the presence



             code = COVNONPUI)                                        ofSARS-CoV

-2 RNA, clinical



                                                                 correlation wit

h patient



                                                                 historyand othe

r



                                                                 diagnostic info

rmation is



                                                                 necessary to



                                                                 determinepatien

t infection



                                                                 status. Positiv

e results



                                                                 do not rule out

bacterial



                                                                 infection or co

-infection



                                                                 with other viru

ses.



                                                                 Negative result

s do not



                                                                 preclude SARS-C

oV-2



                                                                 infection andsh

ould not be



                                                                 used as the sol

e basis for



                                                                 patient



                                                                 managementdecis

ions.



                                                                 Negative result

s must be



                                                                 combined with



                                                                 otherclinical



                                                                 observations, p

atient



                                                                 history, and



                                                                 epidemiological

information



                                                                 . Detection of 

SARS-CoV-2



                                                                 RNA may be affe

cted



                                                                 bysample collec

tion



                                                                 methods, storag

e



                                                                 conditions, and

/or stageof



                                                                 infection. [facility]

l RNA



                                                                 mutations, vacc

inations,



                                                                 antiviraltherap

eutics,



                                                                 antibiotics,



                                                                 chemotherapeuti

c



                                                                 orimmunosuppres

kevan drugs



                                                                 have not been e

valuated



                                                                 for effectson d

etection.



                                                                 Results are for

 the



                                                                 identification 

of



                                                                 SARS-CoV-2 RNA 

usingthe



                                                                 [facility] [facility]  Sy

stem under



                                                                 the FDA Emergen

cy



                                                                 UseAuthorizatio

n. The



                                                                 testing is perf

ormed by



                                                                 laly narayan in the



                                                                 procedures for 

the [employer]



                                                                  molecular

diagnostic



                                                                 SARS-CoV-2 assa

y in vitro.



Novel Coronavirus  Inhouse2020-10-07 15:47:00





             Test Item    Value        Reference Range Interpretation Comments

 

             Novel Coronavirus Negative     Negative                  Positive r

esults are



             2019 Inhouse (test                                        indicativ

e of the presence



             code = COVNONPUI)                                        ofSARS-CoV

-2 RNA, clinical



                                                                 correlation wit

h patient



                                                                 historyand othe

r



                                                                 diagnostic info

rmation is



                                                                 necessary to



                                                                 determinepatien

t infection



                                                                 status. Positiv

e results



                                                                 do not rule out

bacterial



                                                                 infection or co

-infection



                                                                 with other viru

ses.



                                                                 Negative result

s do not



                                                                 preclude SARS-C

oV-2



                                                                 infection andsh

ould not be



                                                                 used as the sol

e basis for



                                                                 patient



                                                                 managementdecis

ions.



                                                                 Negative result

s must be



                                                                 combined with



                                                                 otherclinical



                                                                 observations, p

atient



                                                                 history, and



                                                                 epidemiological

information



                                                                 . Detection of 

SARS-CoV-2



                                                                 RNA may be affe

cted



                                                                 bysample collec

tion



                                                                 methods, storag

e



                                                                 conditions, and

/or stageof



                                                                 infection. [facility]

l RNA



                                                                 mutations, vacc

inations,



                                                                 antiviraltherap

eutics,



                                                                 antibiotics,



                                                                 chemotherapeuti

c



                                                                 orimmunosuppres

kevan drugs



                                                                 have not been e

valuated



                                                                 for effectson d

etection.



                                                                 Results are for

 the



                                                                 identification 

of



                                                                 SARS-CoV-2 RNA 

usingthe



                                                                 [place]  Sy

stem under



                                                                 the FDA Emergen

cy



                                                                 UseAuthorizatio

n. The



                                                                 testing is perf

ormed by



                                                                 laly narayan in the



                                                                 procedures for 

the [employer]



                                                                  molecular

diagnostic



                                                                 SARS-CoV-2 amanda armando in vitro.



[id number] 16:38:00





             Test Item    Value        Reference Range Interpretation Comments

 

             GLUBED (test code = GLUBED) 162 mg/dL           H            



- XR CHEST 1 -30-74 15:38:00 Patient Name: AILEEN GARG Unit No: 
[id number] EXAMS:  CPT CODE: [id number] XR CHEST 1 V 64010 PORTABLE CHEST 2020 3:17 PM COMMENT: COMPARISON: No prior exams available. A right-sided 
PICC line has been placed with the tip in the SVC. There is no evidence for 
pneumothorax.  ** Electronically Signed by Zach Juarez MD ** ** on 
2020 at 1538 ** Reported and signed by: Zach Juarez MD  CC: German Hall; Nida Archibald MD  Technologist: PERLITA ZARATE RT(R) Transcribed D/T: 
2020 (0888) t.SDR.JCL [facility] NAME: AILEEN GARG [id number]
[facility] [place] PHYS: Nida Peoples MD : 1960 AGE: 59 SEX: M [place] [address] ACCT NO: [id number] LOC: Y.522 A PHONE #: [phone] EXAM DATE: 
2020 STATUS: ADM IN FAX #: [phone] RAD #: D/C DT PAGE 1 Signed Report
Patient Name: AILEEN GARG Unit No: [id number] EXAMS:  CPT CODE: [id number] XR
CHEST 1 V 27309 (Continued) Orig Print D/T: S: 2020 (1542)  [facility] NAME: HANNAH GARG [address] [facility] [place]  PHYS: Nida Peoples MD : 1960 AGE: 59 SEX: M [place] [address] ACCT NO: 
[id number] LOC: Y.522 A PHONE #: [phone] EXAM DATE: 2020 STATUS: 
ADM IN FAX #: [phone] RAD #: D/C DT PAGE 2 Signed [id number] 
12:13:00





             Test Item    Value        Reference Range Interpretation Comments

 

             GLUBED (test code = GLUBED) 136 mg/dL           H            



CBC W/MANUAL [id number] 09:59:00





             Test Item    Value        Reference Range Interpretation Comments

 

             WHITE BLOOD CELL 24.3 K/mm3   5.7-10.5     HH           VERIFIED BY



             (test code = WBC)                                        REPEAT



                                                                 ANALYSIS.CRITIC

A



                                                                 L VALUE CALLED



                                                                 TO ANGELIQUE CARRIZALES/LIONEL



                                                                 BACK &



                                                                 CONFIRMED? YESB

Y



                                                                 Y.LAB.CD



                                                                 20 1950

 

             RED BLOOD CELL (test 4.68 M/mm3   4.2-5.4      N            



             code = RBC)                                         

 

             HEMOGLOBIN (test code 14.1 g/dL    12-16        N            



             = HGB)                                              

 

             HEMATOCRIT (test code 43.8 %       37-47        N            



             = HCT)                                              

 

             MEAN CELL VOLUME 94 fL        80-98        N            



             (test code = MCV)                                        

 

             MEAN CELL HGB (test 30.1 pg      27-34        N            



             code = MCH)                                         

 

             MEAN CELL HGB 32.2 g/dL    30.8-34.1    N            



             CONCENTRATION (test                                        



             code = MCHC)                                        

 

             RED CELL DISTRIBUTION 13.3 %       11-16        N            



             WIDTH (test code =                                        



             RDW)                                                

 

             PLT (test code = PLT) 276 K/mm3    130-400      N            

 

             MEAN PLATELET VOLUME 10.2 fL      8.9-12.1     N            



             (test code = MPV)                                        

 

             STAIN ACCEPTABILITY STAIN ACCEPTABLE                           



             (test code = STN                                        



             ACCEPTABLE)                                         

 

             CELLS COUNTED (test 100          >100                      



             code = TCC)                                         

 

             SEGMENTED NEUTROPHILS 84 %         50-65        H            



             (test code = SEG)                                        

 

             BAND NEUTROPHIL (test 8 %          0-10         N            



             code = BAND)                                        

 

             LYMPHOCYTE (test code 2 %          20-40        LL           



             = LYMPH)                                            

 

             MONOCYTE (test code = 6 %          2-9          N            



             MON)                                                

 

             NUCLEATED RED BLOOD 0 %          0-0          N            



             CELL (test code =                                        



             NRBC)                                               



PATHOLOGIST REVIEW [id number] 09:59:00





             Test Item    Value        Reference Range Interpretation Comments

 

             PATHOLOGIST REVIEW                                        RBC- Norm

ocytic



             REQUIRED (test code                                        normochr

omicPlatelets -



             = PATHH)                                            AdequateWBCs- L

eukocytocis



                                                                 with neutrophil

ia. No



                                                                 immature cells,

 blasts or



                                                                 left shift iden

tified. Terri Zuniga MD Patholog

ist



SED [id number] 09:59:00





             Test Item    Value        Reference Range Interpretation Comments

 

             SED RATE (test code = SEDW) 40 mm/hr     0-15         H            



[id number] 05:17:00





             Test Item    Value        Reference Range Interpretation Comments

 

             GLUBED (test code = GLUBED) 173 mg/dL           H            



[id number] 20:07:00





             Test Item    Value        Reference Range Interpretation Comments

 

             GLUBED (test code = GLUBED) 195 mg/dL           H            



[id number] 17:28:00





             Test Item    Value        Reference Range Interpretation Comments

 

             GLUBED (test code = GLUBED) 184 mg/dL           H            



[id number] 11:58:00





             Test Item    Value        Reference Range Interpretation Comments

 

             GLUBED (test code = GLUBED) 199 mg/dL           H            



[id number] 09:41:00





             Test Item    Value        Reference Range Interpretation Comments

 

             GLUBED (test code = GLUBED) 155 mg/dL           H            



[id number] 05:44:00





             Test Item    Value        Reference Range Interpretation Comments

 

             GLUBED (test code = GLUBED) 190 mg/dL           H            



URINALYSIS [id number] 13:09:00





             Test Item    Value        Reference Range Interpretation Comments

 

             UA COLOR (test code = COLU) YELLOW       YELLOW                    

 

             UA APPEARANCE (test code = CLEAR        CLEAR                     



             APPU)                                               

 

             UA GLUCOSE DIPSTICK (test code TRACE        NEGATIVE     A         

   



             = DGLUU)                                            

 

             UA BILIRUBIN DIPSTICK (test NEGATIVE     NEGATIVE                  



             code = BILU)                                        

 

             UA KETONE DIPSTICK (test code NEGATIVE mg/dL NEGATIVE              

    



             = KETU)                                             

 

             UA SPECIFIC GRAVITY (test code 1.010        1.003-1.035            

   



             = SGU)                                              

 

             UA BLOOD DIPSTICK (test code = NEGATIVE     NEGATIVE               

   



             JAGUAR)                                                

 

             UA PH DIPSTICK (test code = 7.0          >6.5                      



             KORIN)                                                

 

             UA PROTEIN DIPSTICK (test code NEGATIVE mg/dL NEG                  

     



             = PROU)                                             

 

             UA UROBILINIOGEN DIPSTICK 0.2 mg/dL    NORM                      



             (test code = URO)                                        

 

             UA NITRITE DIPSTICK (test code NEGATIVE     NEG                    

   



             = JAVIER)                                             

 

             UA LEUKOCYTE ESTERASE DIPSTICK NEGATIVE     NEGATIVE               

   



             (test code = LEUU)                                        

 

             UA WBC (test code = WBCU) 5-10 /HPF    0-2                       

 

             UA RBC (test code = RBCU) 0-2 /HPF     0-2                       

 

             UA EPITHELIAL CELLS (test code 5-10 /HPF    0-2                    

   



             = EPIU)                                             

 

             UA BACTERIA (test code = BACU) FEW /HPF     NONE                   

   

 

             UA RENAL CELLS (test code = FEW /HPF     NONE SEEN    A            



             SUNDAR)                                               



SOURCE OF URINE: CLEAN CATCHIndication for culture: Suprapubic PainGLUBED
2020 11:25:00





             Test Item    Value        Reference Range Interpretation Comments

 

             GLUBED (test code = GLUBED) 81 mg/dL            N            



[id number] 05:14:00





             Test Item    Value        Reference Range Interpretation Comments

 

             GLUBED (test code = GLUBED) 109 mg/dL           N            



GLUBED2020-08-10 20:44:00





             Test Item    Value        Reference Range Interpretation Comments

 

             GLUBED (test code = GLUBED) 197 mg/dL           H            



GLUBED2020-08-10 16:42:00





             Test Item    Value        Reference Range Interpretation Comments

 

             GLUBED (test code = GLUBED) 197 mg/dL           H            



GLUBED2020-08-10 11:45:00





             Test Item    Value        Reference Range Interpretation Comments

 

             GLUBED (test code = GLUBED) 91 mg/dL            N            



GLUBED2020-08-10 09:18:00





             Test Item    Value        Reference Range Interpretation Comments

 

             GLUBED (test code = GLUBED) 181 mg/dL           H            



GLUBED2020-08-10 09:18:00





             Test Item    Value        Reference Range Interpretation Comments

 

             GLUBED (test code = GLUBED) 115 mg/dL           N            



GLUBED2020-08-10 09:18:00





             Test Item    Value        Reference Range Interpretation Comments

 

             GLUBED (test code = GLUBED) 252 mg/dL           H            



GLUBED2020-08-10 05:20:00





             Test Item    Value        Reference Range Interpretation Comments

 

             GLUBED (test code = GLUBED) 192 mg/dL           H            



[id number] 20:24:00





             Test Item    Value        Reference Range Interpretation Comments

 

             GLUBED (test code = GLUBED) 228 mg/dL           H            



[id number] 11:54:00





             Test Item    Value        Reference Range Interpretation Comments

 

             GLUBED (test code = GLUBED) 157 mg/dL           H            



CBC W/AUTO [id number] 06:37:00





             Test Item    Value        Reference Range Interpretation Comments

 

             WHITE BLOOD CELL (test code = WBC) 15.1 K/mm3   4.5-11.2     H     

       

 

             RED BLOOD CELL (test code = RBC) 3.48 M/mm3   3.42-5.20    N       

     

 

             HEMOGLOBIN (test code = HGB) 10.4 g/dL    10.2-14.9    N           

 

 

             HEMATOCRIT (test code = HCT) 34.5 %       31.3-44.8    N           

 

 

             MEAN CELL VOLUME (test code = MCV) 99 fL        81-95        H     

       

 

             MEAN CELL HGB (test code = MCH) 29.9 pg      27-34        N        

    

 

             MEAN CELL HGB CONCETRATION (test 30.1 gm/dL   32-35        L       

     



             code = MCHC)                                        

 

             RED CELL DISTRIBUTION WIDTH (test 13.4 %       11.8-14.8    N      

      



             code = RDW)                                         

 

             PLATELET COUNT (test code = PLT) 233 K/mm3    135-380      N       

     

 

             MEAN PLATELET VOLUME (test code = 10.5 fl      9.1-12.7     N      

      



             MPV)                                                

 

             NEUTROPHIL % (test code = NT%) 84.4 %       51.5-79.7    H         

   

 

             LYMPHOCYTE % (test code = LY%) 6.6 %        14-40        L         

   

 

             MONOCYTE % (test code = MO%) 7.1 %        4.0-10.2     N           

 

 

             EOSINOPHIL % (test code = EO%) 0.9 %        0-4.1        N         

   

 

             BASOPHIL % (test code = BA%) 0.2 %        0.1-0.7      N           

 

 

             NEUTROPHIL # (test code = NT#) 12.8 K/mm3                          

   

 

             LYMPHOCYTE # (test code = LY#) 1.0 K/mm3                           

   

 

             MONOCYTE # (test code = MO#) 1.1 K/mm3                             

 

 

             EOSINOPHIL # (test code = EO#) 0.13 K/mm3                          

   

 

             BASOPHIL # (test code = BA#) 0.0 K/mm3                             

 

 

             RBC MORPHOLOGY REQUIRED (test code NORMAL       NORMAL             

       



             = RBCM)                                             

 

             PLATELET MORPHOLOGY REQUIRED (test NORMAL       NORMAL             

       



             code = PLTMR)                                        



CBC W/AUTO [id number] 06:37:00





             Test Item    Value        Reference Range Interpretation Comments

 

             WHITE BLOOD CELL (test 15.1 K/mm3   4.5-11.2     H            DONE 

AT: [facility]



             code = WBC)                                         [facility] [address]



                                                                 [place], [place]



                                                                 [id number]

 

             RED BLOOD CELL (test 3.48 M/mm3   3.42-5.20                 



             code = RBC)                                         

 

             HEMOGLOBIN (test code = 10.4 g/dL    10.2-14.9                 



             HGB)                                                

 

             HEMATOCRIT (test code = 34.5 %       31.3-44.8                 



             HCT)                                                

 

             MEAN CELL VOLUME (test 99 fL        81-95        H            



             code = MCV)                                         

 

             MEAN CELL HGB (test code 29.9 pg      27-34                     



             = MCH)                                              

 

             MEAN CELL HGB 30.1 gm/dL   32-35        L            



             CONCENTRATION (test code                                        



             = MCHC)                                             

 

             RED CELL DISTRIBUTION 13.4 %       11.8-14.8                 



             WIDTH (test code = RDW)                                        

 

             PLT (test code = PLT) 233 K/mm3    135-380                   

 

             MEAN PLATELET VOLUME 10.5 fl      9.1-12.7                  



             (test code = MPV)                                        

 

             NEUTROPHIL % (test code 84.4 %       51.5-79.7    H            



             = NT%)                                              

 

             LYMPHOCYTE % (test code 6.6 %        14-40        L            



             = LY%)                                              

 

             MONOCYTE % (test code = 7.1 %        4.0-10.2                  



             MO%)                                                

 

             EOSINOPHIL % (test code 0.9 %        0-4.1                     



             = EO%)                                              

 

             BASOPHIL % (test code = 0.2 %        0.1-0.7                   



             BA%)                                                

 

             NEUTROPHIL # (test code 12.8 K/mm3   ()                        



             = NT#)                                              

 

             LYMPHOCYTE # (test code 1.0 K/mm3    ()                        



             = LY#)                                              

 

             MONOCYTE # (test code = 1.1 K/mm3    ()                        



             MO#)                                                

 

             EOSINOPHIL # (test code 0.13 K/mm3   ()                        



             = EO#)                                              

 

             BASOPHIL # (test code = 0 K/mm3      ()                        



             BA#)                                                

 

             PLATELET MORPHOLOGY NORMAL       NORMAL                    



             REQUIRED (test code =                                        



             PLTMR)                                              



[id number] 20:45:00





             Test Item    Value        Reference Range Interpretation Comments

 

             GLUBED (test code = GLUBED) 178 mg/dL           H            



[id number] 16:35:00





             Test Item    Value        Reference Range Interpretation Comments

 

             GLUBED (test code = GLUBED) 204 mg/dL           H            



[id number] 10:44:00





             Test Item    Value        Reference Range Interpretation Comments

 

             GLUBED (test code = GLUBED) 235 mg/dL           H            



[id number] 05:47:00





             Test Item    Value        Reference Range Interpretation Comments

 

             GLUBED (test code = GLUBED) 180 mg/dL           H            



GLYCOSYLATED HEMOGLOBIN (HA1C)2020 02:47:00





             Test Item    Value        Reference Range Interpretation Comments

 

             GLYCOSYLATED 9.1 %        4.8-5.9      H            Any condition t

hat shortens



             HEMOGLOBIN (HA1C)                                        erythocyte

 survival or



             (test code = GLYHGB)                                        decreas

esmean erythrocyte



                                                                 age (e.g., сергей

very from



                                                                 acute blood los

s,hemolytic



                                                                 anemia) will fa

lsely lower



                                                                 HGBA1c resultsr

egardless of



                                                                 the method used

. HGBA1c



                                                                 results from pa

thaontzacariasith



                                                                 HbSS, HbCC, and

 HbSc must



                                                                 be interpreted 

with



                                                                 cautiongiven th

e



                                                                 pathological pr

ocesses,



                                                                 including anemi

a,increased



                                                                 red cell turnov

er,



                                                                 transfusion req

uirements,



                                                                 thatadversely i

mpact HGBA1c



                                                                 as a marker of 

long-term



                                                                 glycemiccontrol

.



                                                                 Alternative for

ms of



                                                                 testing such as



                                                                 fructosaminesho

uld be



                                                                 considered for 

these



                                                                 patients.



GLYCOSYLATED HEMOGLOBIN (HA1C)2020 02:47:00





             Test Item    Value        Reference Range Interpretation Comments

 

             GLYCOSYLATED 9.1 %        4.8-5.9      H            Any condition t

hat shortens



             HEMOGLOBIN (HA1C)                                        erythocyte

 survival or



             (test code = GLYHGB)                                        decreas

esmean erythrocyte



                                                                 age (e.g., сергей

very from



                                                                 acute blood los

s,hemolytic



                                                                 anemai) will fa

lsely lower



                                                                 HGBA1c resultsr

egardless of



                                                                 the method used

. HGBA1c



                                                                 results frompat

ients with



                                                                 HbSS, HbCC and 

HbSc must be



                                                                 interpreted wit

hcaution



                                                                 given the patho

logical



                                                                 processes, incl

uding



                                                                 anemia,increase

d red cell



                                                                 turnover, trans

fusion



                                                                 requirements, t

hatadversely



                                                                 impact HGBA1c a

s a marker



                                                                 of long-term



                                                                 glycemiccontrol

.



                                                                 Alternative for

ms of



                                                                 testing such as



                                                                 fructosaminesho

uld be



                                                                 considered for 

these



                                                                 patients.Any co

ndition that



                                                                 shortens erytho

cyte



                                                                 survival or dec

reasesmean



                                                                 erythrocyte age

 (e.g.,



                                                                 recovery from a

cute blood



                                                                 loss,hemolytic 

anemia) will



                                                                 falsely lower H

GBA1c



                                                                 resultsregardle

ss of the



                                                                 method used. HG

BA1c results



                                                                 from patientswi

th HbSS,



                                                                 HbCC, and HbSc 

must be



                                                                 interpreted wit

h



                                                                 cautiongiven th

e



                                                                 pathological pr

ocesses,



                                                                 including anemi

a,increased



                                                                 red cell turnov

er,



                                                                 transfusion req

uirements,



                                                                 thatadversely i

mpact HGBA1c



                                                                 as a marker of 

long-term



                                                                 glycemiccontrol

.



                                                                 Alternative for

ms of



                                                                 testing such as



                                                                 fructosaminesho

uld be



                                                                 considered for 

these



                                                                 patients.DONE A

T: [facility]



                                                                 [address] [facility],



                                                                 [place], 

83



CBC W/MANUAL [id number] 21:59:00





             Test Item    Value        Reference Range Interpretation Comments

 

             WHITE BLOOD CELL 24.3 K/mm3   5.7-10.5     HH           VERIFIED BY



             (test code = WBC)                                        REPEAT



                                                                 ANALYSIS.CRITIC

A



                                                                 L VALUE CALLED



                                                                 TO ANGELIQUE CARRIZALES/LIONEL



                                                                 BACK &



                                                                 CONFIRMED? YESB

Y



                                                                 Y.LAB.CD



                                                                 20 1950

 

             RED BLOOD CELL (test 4.68 M/mm3   4.2-5.4      N            



             code = RBC)                                         

 

             HEMOGLOBIN (test code 14.1 g/dL    12-16        N            



             = HGB)                                              

 

             HEMATOCRIT (test code 43.8 %       37-47        N            



             = HCT)                                              

 

             MEAN CELL VOLUME 94 fL        80-98        N            



             (test code = MCV)                                        

 

             MEAN CELL HGB (test 30.1 pg      27-34        N            



             code = MCH)                                         

 

             MEAN CELL HGB 32.2 g/dL    30.8-34.1    N            



             CONCENTRATION (test                                        



             code = MCHC)                                        

 

             RED CELL DISTRIBUTION 13.3 %       11-16        N            



             WIDTH (test code =                                        



             RDW)                                                

 

             PLT (test code = PLT) 276 K/mm3    130-400      N            

 

             MEAN PLATELET VOLUME 10.2 fL      8.9-12.1     N            



             (test code = MPV)                                        

 

             STAIN ACCEPTABILITY STAIN ACCEPTABLE                           



             (test code = STN                                        



             ACCEPTABLE)                                         

 

             CELLS COUNTED (test 100          >100                      



             code = TCC)                                         

 

             SEGMENTED NEUTROPHILS 84 %         50-65        H            



             (test code = SEG)                                        

 

             BAND NEUTROPHIL (test 8 %          0-10         N            



             code = BAND)                                        

 

             LYMPHOCYTE (test code 2 %          20-40        LL           



             = LYMPH)                                            

 

             MONOCYTE (test code = 6 %          2-9          N            



             MON)                                                

 

             NUCLEATED RED BLOOD 0 %          0-0          N            



             CELL (test code =                                        



             NRBC)                                               



PATHOLOGIST REVIEW [id number] 21:59:00





             Test Item    Value        Reference Range Interpretation Comments

 

             PATHOLOGIST REVIEW REQUIRED (test code                             

           



             = PATHH)                                            



SED [id number] 21:59:00





             Test Item    Value        Reference Range Interpretation Comments

 

             SED RATE (test code = SEDW) 40 mm/hr     0-15         H            



SYNOVIAL FLD CELL [id number] 21:41:00





             Test Item    Value        Reference Range Interpretation Comments

 

             SYNOVIAL FLD              LT. YELLOW                



             COLOR (test code                                        



             = COLSY)                                            

 

             SYNOVIAL FLD              CLEAR                     



             APPEARANCE (test                                        



             code = APPSY)                                        

 

             SYNOVIAL FLD  mL                                    



             VOLUME (test code                                        



             = VOLSY)                                            

 

             SYNOVIAL FLD WBC [id number] /MM3  0-200        H            VERIFIED BY

 DILUTION



             (test code =                                        



             WBCSY)                                              

 

             SYNOVIAL FLD RBC [id number]   0-2          H            NOTE: An au

tomated



             (test code = /mm3                                   method is now b

eing used



             RBCSY)                                              to determinesyn

ovial



                                                                 fluid WBC and R

BC



                                                                 counts. The dif

ferential



                                                                 willstill be pe

rformed



                                                                 manually.



SPECIMEN COMMENT: ASPIRATION LEFT KNEE DONE BY DR JUARES FLD CELL 
[id number] 21:41:00





             Test Item    Value        Reference Range Interpretation Comments

 

             SYNOVIAL FLD BROWN        LT. YELLOW   A            LIGHT BROWN



             COLOR (test code                                        



             = COLSY)                                            

 

             SYNOVIAL FLD OPAQUE       CLEAR                     



             APPEARANCE (test                                        



             code = APPSY)                                        

 

             SYNOVIAL FLD 1 mL                                   



             VOLUME (test code                                        



             = VOLSY)                                            

 

             SYNOVIAL FLD WBC [id number] /MM3  0-200        H            VERIFIED BY

 DILUTION



             (test code =                                        



             WBCSY)                                              

 

             SYNOVIAL FLD RBC [id number]   0-2          H            NOTE: An au

tomated



             (test code = /mm3                                   method is now b

eing used



             RBCSY)                                              to determinesyn

ovial



                                                                 fluid WBC and R

BC



                                                                 counts. The dif

ferential



                                                                 willstill be pe

rformed



                                                                 manually.

 

             SYNOVIAL FLD POLY 89 %         0-25         H            



             (test code =                                        



             POLYSY)                                             

 

             SYNOVIAL FLD 9 %          0-78         N            



             LYMPHOCYTE (test                                        



             code = LYMPHSY)                                        

 

             SYNOVIAL FLD 2 %          0-71         N            



             MONOCYTE (test                                        



             code = MONOSY)                                        



SPECIMEN COMMENT: ASPIRATION LEFT KNEE DONE BY DR [id number] 
21:06:00





             Test Item    Value        Reference Range Interpretation Comments

 

             GLUBED (test code = GLUBED) 307 mg/dL           H            



CBC W/MANUAL [id number] 20:20:00





             Test Item    Value        Reference Range Interpretation Comments

 

             WHITE BLOOD CELL 24.3 K/mm3   5.7-10.5     HH           VERIFIED BY



             (test code = WBC)                                        REPEAT



                                                                 ANALYSIS.CRITIC

A



                                                                 L VALUE CALLED



                                                                 TO ANGELIQUE CARRIZALES/LIONEL



                                                                 BACK &



                                                                 CONFIRMED? YESB

Y



                                                                 Y.LAB.CD



                                                                 20 1950

 

             RED BLOOD CELL (test 4.68 M/mm3   4.2-5.4      N            



             code = RBC)                                         

 

             HEMOGLOBIN (test code 14.1 g/dL    12-16        N            



             = HGB)                                              

 

             HEMATOCRIT (test code 43.8 %       37-47        N            



             = HCT)                                              

 

             MEAN CELL VOLUME 94 fL        80-98        N            



             (test code = MCV)                                        

 

             MEAN CELL HGB (test 30.1 pg      27-34        N            



             code = MCH)                                         

 

             MEAN CELL HGB 32.2 g/dL    30.8-34.1    N            



             CONCENTRATION (test                                        



             code = MCHC)                                        

 

             RED CELL DISTRIBUTION 13.3 %       11-16        N            



             WIDTH (test code =                                        



             RDW)                                                

 

             PLT (test code = PLT) 276 K/mm3    130-400      N            

 

             MEAN PLATELET VOLUME 10.2 fL      8.9-12.1     N            



             (test code = MPV)                                        

 

             STAIN ACCEPTABILITY STAIN ACCEPTABLE                           



             (test code = STN                                        



             ACCEPTABLE)                                         

 

             CELLS COUNTED (test 100          >100                      



             code = TCC)                                         

 

             SEGMENTED NEUTROPHILS 84 %         50-65        H            



             (test code = SEG)                                        

 

             BAND NEUTROPHIL (test 8 %          0-10         N            



             code = BAND)                                        

 

             LYMPHOCYTE (test code 2 %          20-40        LL           



             = LYMPH)                                            

 

             MONOCYTE (test code = 6 %          2-9          N            



             MON)                                                

 

             NUCLEATED RED BLOOD 0 %          0-0          N            



             CELL (test code =                                        



             NRBC)                                               



PATHOLOGIST REVIEW [id number] 20:20:00





             Test Item    Value        Reference Range Interpretation Comments

 

             PATHOLOGIST REVIEW REQUIRED (test code                             

           



             = PATHH)                                            



SED [id number] 20:20:00





             Test Item    Value        Reference Range Interpretation Comments

 

             SED RATE (test code = SEDW)  mm/hr       0-15                      



CBC W/MANUAL [id number] 19:52:00





             Test Item    Value        Reference Range Interpretation Comments

 

             WHITE BLOOD CELL (test 24.3 K/mm3   5.7-10.5     HH           VERIF

IED BY REPEAT



             code = WBC)                                         ANALYSIS.CRITIC

AL



                                                                 VALUE CALLED TO



                                                                 ANGELIQUE CARRIZALES/LIONEL STEVENS

CK &



                                                                 CONFIRMED? YESB

Y



                                                                 Y.LAB.CD 



                                                                 1949

 

             RED BLOOD CELL (test 4.68 M/mm3   4.2-5.4      N            



             code = RBC)                                         

 

             HEMOGLOBIN (test code = 14.1 g/dL    12-16        N            



             HGB)                                                

 

             HEMATOCRIT (test code = 43.8 %       37-47        N            



             HCT)                                                

 

             MEAN CELL VOLUME (test 94 fL        80-98        N            



             code = MCV)                                         

 

             MEAN CELL HGB (test code 30.1 pg      27-34        N            



             = MCH)                                              

 

             MEAN CELL HGB 32.2 g/dL    30.8-34.1    N            



             CONCENTRATION (test code                                        



             = MCHC)                                             

 

             RED CELL DISTRIBUTION 13.3 %       11-16        N            



             WIDTH (test code = RDW)                                        

 

             PLT (test code = PLT) 276 K/mm3    130-400      N            

 

             MEAN PLATELET VOLUME 10.2 fL      8.9-12.1     N            



             (test code = MPV)                                        

 

             STAIN ACCEPTABILITY                                        



             (test code = STN                                        



             ACCEPTABLE)                                         

 

             CELLS COUNTED (test code              >100                      



             = TCC)                                              

 

             SEGMENTED NEUTROPHILS  %           50-65                     



             (test code = SEG)                                        

 

             LYMPHOCYTE (test code =  %           20-40                     



             LYMPH)                                              

 

             NUCLEATED RED BLOOD CELL 0 %          0-0          N            



             (test code = NRBC)                                        



SED [id number] 19:52:00





             Test Item    Value        Reference Range Interpretation Comments

 

             SED RATE (test code = SEDW)  mm/hr       0-15                      



CBC W/AUTO [id number] 19:52:00





             Test Item    Value        Reference Range Interpretation Comments

 

             WHITE BLOOD CELL (test 24.3 K/mm3   5.7-10.5     HH           VERIF

IED BY REPEAT



             code = WBC)                                         ANALYSIS.CRITIC

AL



                                                                 VALUE CALLED TO



                                                                 ANGELIQUE CARRIZALES/LIONEL STEVENS

CK



                                                                 & CONFIRMED? CRISTINO RYAN



                                                                 Y.LAB.CD 



                                                                 1949

 

             RED BLOOD CELL (test 4.68 M/mm3   4.2-5.4      N            



             code = RBC)                                         

 

             HEMOGLOBIN (test code = 14.1 g/dL    12-16        N            



             HGB)                                                

 

             HEMATOCRIT (test code = 43.8 %       37-47        N            



             HCT)                                                

 

             MEAN CELL VOLUME (test 94 fL        80-98        N            



             code = MCV)                                         

 

             MEAN CELL HGB (test code 30.1 pg      27-34        N            



             = MCH)                                              

 

             MEAN CELL HGB 32.2 g/dL    30.8-34.1    N            



             CONCENTRATION (test code                                        



             = MCHC)                                             

 

             RED CELL DISTRIBUTION 13.3 %       11-16        N            



             WIDTH (test code = RDW)                                        

 

             PLT (test code = PLT) 276 K/mm3    130-400      N            

 

             MEAN PLATELET VOLUME 10.2 fL      8.9-12.1     N            



             (test code = MPV)                                        

 

             NEUTROPHIL % (test code 89.1 %       45-70        H            



             = NT%)                                              

 

             LYMPHOCYTE % (test code 3.0 %        20-40        L            



             = LY%)                                              

 

             MONOCYTE % (test code = 7.2 %        3-10         N            



             MO%)                                                

 

             EOSINOPHIL % (test code 0.0 %        1-5          L            



             = EO%)                                              

 

             BASOPHIL % (test code = 0.2 %        0.0-1.1      N            



             BA%)                                                

 

             NEUTROPHIL # (test code 21.62 K/mm3  2.00-7.50    H            



             = NT#)                                              

 

             LYMPHOCYTE # (test code 0.72 K/mm3   1.50-4.00    L            



             = LY#)                                              

 

             MONOCYTE # (test code = 1.75 K/mm3   0.2-0.8      H            



             MO#)                                                

 

             EOSINOPHIL # (test code 0.01 K/mm3   0.04-0.4     L            



             = EO#)                                              

 

             BASOPHIL # (test code = 0.06 K/mm3   0.02-0.10    N            



             BA#)                                                

 

             MANUAL DIFF REQUIRED YES          MANUAL DIFF               MANUAL 

DIFF



             (test code = MDIFF)                                        REQUIRED

.

 

             NUCLEATED RED BLOOD CELL 0 %          0-0          N            



             (test code = NRBC)                                        



CBC W/MANUAL [id number] 19:52:00





             Test Item    Value        Reference Range Interpretation Comments

 

             STAIN ACCEPTABILITY (test code = STN                               

         



             ACCEPTABLE)                                         

 

             CELLS COUNTED (test code = TCC)              >100                  

    

 

             SEGMENTED NEUTROPHILS (test code = SEG)  %           50-65         

            

 

             LYMPHOCYTE (test code = LYMPH)  %           20-40                  

   



SED [id number] 19:52:00





             Test Item    Value        Reference Range Interpretation Comments

 

             SED RATE (test code = SEDW)  mm/hr       0-15                      



PROTHROMBIN [id number] 19:49:00





             Test Item    Value        Reference Range Interpretation Comments

 

             PROTHROMBIN TIME 14.6 secs    10.1-12.5    H            



             PATIENT (test code =                                        



             PTP)                                                

 

             INTERNATIONAL NORMAL 1.31         <2.0                      RECOMME

NDED THERAPEUTIC



             RATIO (test code =                                        RANGE FOR

 ORAL



             INR)                                                ANTICOAGULANTTR

EATMENT:



                                                                 CONDITION INRPr

ophylaxis



                                                                 of venous throm

bosis in



                                                                 2.0 - 3.0 high-

risk



                                                                 medical or surg

ical



                                                                 patientsTreatme

nt of



                                                                 venous thrombos

is  2.0 -



                                                                 3.0Prevention o

f



                                                                 embolism 2.0 -



                                                                 3.0Prevention o

f



                                                                 recurrent embol

ism, or



                                                                 3.0 - 4.5 patie

nts with



                                                                 mechanical pros

thetic



                                                                 intravascular v

varela



IS PATIENT ON ANTICOAGULANTS ? NHas Lab been notified if Patient is on Heparin 
Drip? NOIf Yes, orderCBC, OCCULT BLOOD, PT every other day NTHROMBOPLASTIN TIME 
[id number] 19:49:00





             Test Item    Value        Reference Range Interpretation Comments

 

             PTT ACTIVATED (test code = APTT) 24.0 secs    24.9-37.0    L       

     



IS PATIENT ON ANTICOAGULANTS ? NHas Lab been notified if Patient is on Heparin 
Drip? NOIf Yes, orderCBC, OCCULT BLOOD, PT every other day NCOMPREHENSIVE 
METABOLIC [id number] 19:46:00





             Test Item    Value        Reference Range Interpretation Comments

 

             SODIUM (test code = NA) 135 mmol/L   136-145      L            

 

             POTASSIUM (test code = 4.3 mmol/L   3.5-5.1      N            



             K)                                                  

 

             CHLORIDE (test code = 97.0 mmol/L         L            



             CL)                                                 

 

             CARBON DIOXIDE (test 27.0 mmol/L  21-32        N            



             code = CO2)                                         

 

             GLUCOSE (test code = 307 mg/dL           H            



             GLU)                                                

 

             BLOOD UREA NITROGEN 19 mg/dL     7-18         H            



             (test code = BUN)                                        

 

             GLOMERULAR FILTRATION 64.7         >60                       Unit o

f measure:



             RATE (test code = GFR)                                        mL/mi

n/1.73



                                                                 p4Muxmalbiv



                                                                 Range:Healthy



                                                                 Adults >90



                                                                 mL/min/1.73 m2 

For



                                                                 Chronic Kidney



                                                                 Disease: Stage 

II



                                                                 Mild Decrease i

n



                                                                 GFR 60-90 Stage

 III



                                                                 Moderate Decrea

se



                                                                 in GFR 30-59 St

age



                                                                 IV  Severe Decr

ease



                                                                 in GFR 15-29 St

age



                                                                 V Kidney Failur

e



                                                                 <15

 

             CREATININE (test code = 1.16 mg/dL   0.55-1.30    N            



             CREAT)                                              

 

             TOTAL PROTEIN (test 6.7 g/dL     6.4-8.2      N            



             code = PROT)                                        

 

             ALBUMIN (test code = 3.0 g/dL     3.4-5.0      L            



             ALB)                                                

 

             GLOBULIN (test code = 3.7 g/dL     2.2-4.2      N            



             GLOB)                                               

 

             ALBUMIN/GLOBULIN RATIO 0.8          0.7-2.0      N            



             (test code = A/G)                                        

 

             CALCIUM (test code = 9.0 mg/dL    8.2-10.1     N            



             CA)                                                 

 

             BILIRUBIN TOTAL (test 0.70 mg/dL   0.2-1.00     N            



             code = BILT)                                        

 

             SGOT/AST (test code = 12.0 U/L     15-37        L            



             AST)                                                

 

             SGPT/ALT (test code = 23.0 U/L     12-78        N            Please

 note new



             ALT)                                                normal range.

 

             ALKALINE PHOSPHATASE 132 U/L             H            



             TOTAL (test code =                                        



             ALKP)                                               



COVID 19 Asymptomatic IH [id number] 18:53:00





             Test Item    Value        Reference Range Interpretation Comments

 

             COVID 19 Asymptomatic IH AG (test NEGATIVE                         

      



             code = COVNONPUIAG)                                        



- XR FLUORO [id number] 15:54:00 Patient Name: AILEEN GARG Unit No: 
[id number] EXAMS: CPT CODE: [id number] XR FLUORO NDL 63374 FLUOROSCOPICALLY 
GUIDED ASPIRATION OF THE LEFT KNEE COMMENT: After informed consent was obtained 
a needle was placed in the knee joint with fluoroscopic guidance. Its position 
was confirmed with an AP radiograph. 0.1 minutes of fluoroscopy time was 
utilized. 7 mL of milky white fluid was aspirated and sent for analysis. No 
immediate complications were encountered. ** Electronically Signed by Zach Juarez MD ** ** on 2020 at 1554 ** Reported and signed by: Zach Juarez MD CC: German Hall Technologist: Irma Klein RT.(R) Transcribed D/T: 
2020 (8208) tCATHLEENL [facility] NAME: AILEEN GARG [address]
[facility] [place] PHYS: German Arevalo : 1960 AGE: 59  SEX: M 
[place] [address] ACCT NO: [id number] LOC: Y.RAD PHONE #: [phone] EXAM
DATE: 2020 STATUS: REG CLI FAX #: [phone]  RAD #: D/C DT PAGE 1 
Signed Report Patient Name: AILEEN GARG Unit No: [id number] EXAMS: CPT CODE:
[id number] XR FLUORO NDL 05273 (Continued) Orig Print D/T: S: 2020 (7498)  
[facility] NAME: AILEEN GARG [address] [facility] [place] PHYS: German Arevalo : 1960 AGE: 59 SEX: M [place] [address] ACCT 
NO: [id number] LOC:Y.RAD PHONE #: [phone] EXAM DATE: 2020 STATUS: 
REG CLI FAX #: [phone] RAD #: D/C DT PAGE 2  Signed Report- DUP VEIN 
[id number] 14:08:00 Patient Name: AILEEN GARG Unit No: [id number] 
EXAMS: CPT CODE: [id number] DUP VEIN UNI/LTD 89850 LEFT LOWER EXTREMITY VENOUS 
DOPPLER DIAGNOSIS: No evidence for deep venous thrombosis from the groin to the 
popliteal fossa. The anterior and posterior tibial veins are patent in the calf 
and the posterior tibial vein is patent at the ankle. COMMENT: COMPARISON: No 
prior exams available.  Multiple real-time, color and duplex Doppler images were
obtained. In the areas described no echogenic thrombus was seen. Normal 
compressibility, augmentation and spontaneous color-flow are observed. ** 
Electronically Signed by Zach Juarez MD ** ** on 2020 at 1408 ** 
Reported and signed by: Zach Juarez MD CC: German Hall  Technologist: 
ABRAHAM CHAPARRO RDMS, RVT Transcribed D/T: 2020 (1408) Bandar [facility] NAME: AILEEN GARG [address] [facility] PHYS: German Arevalo : 1960 AGE: 59 SEX: M [place] [address] ACCT 
NO: [id number] LOC: ANURADHARAD PHONE #: [phone] EXAM DATE: 2020 STATUS: 
REG CLI FAX #: [phone]  RAD #: D/C DT PAGE 1 SignedReport Patient Name: 
AILEEN GARG Unit No: [id number]  EXAMS: CPT CODE: [id number] DUP VEIN UNI/LTD
74550 (Continued) Orig Print D/T: S: 2020 (1411)  [facility] NAME: AILEEN GARG [address] [facility] PHYS: German Arevalo : 1960 AGE: 59 SEX: M [place] [address] ACCT NO: [id number] LOC:
Y.RAD PHONE #: [phone] EXAM DATE: 2020 STATUS: REG CLI FAX #: 
[phone] RAD #: D/C DT PAGE 2  Signed [id number] 07:43:00





             Test Item    Value        Reference Range Interpretation Comments

 

             GLUBED (test code = GLUBED) 164 mg/dL           H            



[id number] 11:25:00





             Test Item    Value        Reference Range Interpretation Comments

 

             GLUBED (test code = GLUBED) 138 mg/dL           H            



BASIC METABOLIC [id number] 06:18:00





             Test Item    Value        Reference Range Interpretation Comments

 

             SODIUM (test code = 138 mmol/L   136-145      N            



             NA)                                                 

 

             POTASSIUM (test code = 5.6 mmol/L   3.5-5.1      H            



             K)                                                  

 

             CHLORIDE (test code = 104.0 mmol/L        N            



             CL)                                                 

 

             CARBON DIOXIDE (test 22.4 mmol/L  21-32        N            



             code = CO2)                                         

 

             GLUCOSE (test code = 226 mg/dL           H            



             GLU)                                                

 

             BLOOD UREA NITROGEN 21 mg/dL     7-18         H            



             (test code = BUN)                                        

 

             GLOMERULAR FILTRATION 86.7         >60                       Unit o

f measure:



             RATE (test code = GFR)                                        mL/mi

n/1.73



                                                                 u8Nzkraaxau



                                                                 Range:Healthy



                                                                 Adults >90



                                                                 mL/min/1.73 m2 

For



                                                                 Chronic Kidney



                                                                 Disease: Stage 

II



                                                                 Mild Decrease i

n



                                                                 GFR  60-90 Stag

e



                                                                 III Moderate



                                                                 Decrease in GFR



                                                                 30-59 Stage IV



                                                                 Severe Decrease

 in



                                                                 GFR 15-29 Stage

 V



                                                                 Kidney Failure 

<15

 

             CREATININE (test code 0.90 mg/dL   0.55-1.30    N            



             = CREAT)                                            

 

             CALCIUM (test code = 7.9 mg/dL    8.2-10.1     L            



             CA)                                                 



HGB [id number] 05:52:00





             Test Item    Value        Reference Range Interpretation Comments

 

             HEMOGLOBIN (test code = HGB) 13.0 g/dL    12-16        N           

 

 

             HEMATOCRIT (test code = HCT) 39.2 %       37-47        N           

 



[id number] 05:21:00





             Test Item    Value        Reference Range Interpretation Comments

 

             GLUBED (test code = GLUBED) 218 mg/dL           H            



[id number] 20:45:00





             Test Item    Value        Reference Range Interpretation Comments

 

             GLUBED (test code = GLUBED) 255 mg/dL           H            



[id number] 16:06:00





             Test Item    Value        Reference Range Interpretation Comments

 

             GLUBED (test code = GLUBED) 179 mg/dL           H            



Novel Coronavirus 2019 [id number] 13:04:00





             Test Item    Value        Reference Range Interpretation Comments

 

             Novel Coronavirus 2019 Inhouse (test Negative     Negative         

         



             code = COVNONPUI)                                        



Novel Coronavirus 2019 [id number] 13:04:00





             Test Item    Value        Reference Range Interpretation Comments

 

             Novel Coronavirus 2019 Inhouse (test Negative     Negative         

         



             code = COVNONPUI)                                        



GLYCOSYLATED HEMOGLOBIN (HA1C)2020 19:19:00





             Test Item    Value        Reference Range Interpretation Comments

 

             GLYCOSYLATED 8.4 %        4.8-5.9      H            Any condition t

hat shortens



             HEMOGLOBIN (HA1C)                                        erythocyte

 survival or



             (test code = GLYHGB)                                        decreas

esmean erythrocyte



                                                                 age (e.g., сергей

very from



                                                                 acute blood los

s,hemolytic



                                                                 anemai) will fa

lsely lower



                                                                 HGBA1c resultsr

egardless of



                                                                 the method used

. HGBA1c



                                                                 results frompat

ients with



                                                                 HbSS, HbCC and 

HbSc must be



                                                                 interpreted wit

hcaution



                                                                 given the patho

logical



                                                                 processes, incl

uding



                                                                 anemia,increase

d red cell



                                                                 turnover, trans

fusion



                                                                 requirements, t

hatadversely



                                                                 impact HGBA1c a

s a marker



                                                                 of long-term



                                                                 glycemiccontrol

.



                                                                 Alternative for

ms of



                                                                 testing such as



                                                                 fructosaminesho

uld be



                                                                 considered for 

these



                                                                 patients.Any co

ndition that



                                                                 shortens erytho

cyte



                                                                 survival or dec

reasesmean



                                                                 erythrocyte age

 (e.g.,



                                                                 recovery from a

cute blood



                                                                 loss,hemolytic 

anemia) will



                                                                 falsely lower H

GBA1c



                                                                 resultsregardle

ss of the



                                                                 method used. HG

BA1c results



                                                                 from patientswi

th HbSS,



                                                                 HbCC, and HbSc 

must be



                                                                 interpreted wit

h



                                                                 cautiongiven th

e



                                                                 pathological pr

ocesses,



                                                                 including anemi

a,increased



                                                                 red cell turnov

er,



                                                                 transfusion req

uirements,



                                                                 thatadversely i

mpact HGBA1c



                                                                 as a marker of 

long-term



                                                                 glycemiccontrol

.



                                                                 Alternative for

ms of



                                                                 testing such as



                                                                 fructosaminesho

uld be



                                                                 considered for 

these



                                                                 patients.DONE A

T: [facility]



                                                                 [address] [address], 

[address]



GLYCOSYLATED HEMOGLOBIN (HA1C)2020 19:18:00





             Test Item    Value        Reference Range Interpretation Comments

 

             GLYCOSYLATED 8.4 %        4.8-5.9      H            Any condition t

hat shortens



             HEMOGLOBIN (HA1C)                                        erythocyte

 survival or



             (test code = GLYHGB)                                        decreas

esmean erythrocyte



                                                                 age (e.g., сергей

very from



                                                                 acute blood los

s,hemolytic



                                                                 anemia) will fa

lsely lower



                                                                 HGBA1c resultsr

egardless of



                                                                 the method used

. HGBA1c



                                                                 results from jm mack



                                                                 HbSS, HbCC, and

 HbSc must



                                                                 be interpreted 

with



                                                                 cautiongiven th

e



                                                                 pathological pr

ocesses,



                                                                 including anemi

a,increased



                                                                 red cell turnov

er,



                                                                 transfusion req

uirements,



                                                                 thatadversely i

mpact HGBA1c



                                                                 as a marker of 

long-term



                                                                 glycemiccontrol

.



                                                                 Alternative for

ms of



                                                                 testing such as



                                                                 fructosaminesho

uld be



                                                                 considered for 

these



                                                                 patients.



COMPREHENSIVE METABOLIC [id number] 15:27:00





             Test Item    Value        Reference Range Interpretation Comments

 

             SODIUM (test code = 141 mmol/L   136-145      N            



             NA)                                                 

 

             POTASSIUM (test code = 4.4 mmol/L   3.5-5.1      N            



             K)                                                  

 

             CHLORIDE (test code = 104.0 mmol/L        N            



             CL)                                                 

 

             CARBON DIOXIDE (test 27.6 mmol/L  21-32        N            



             code = CO2)                                         

 

             GLUCOSE (test code = 205 mg/dL           H            



             GLU)                                                

 

             BLOOD UREA NITROGEN 16 mg/dL     7-18         N            



             (test code = BUN)                                        

 

             GLOMERULAR FILTRATION 79.5         >60                       Unit o

f measure:



             RATE (test code = GFR)                                        mL/mi

n/1.73



                                                                 b8Ltneuiurd



                                                                 Range:Healthy



                                                                 Adults >90



                                                                 mL/min/1.73 m2 

For



                                                                 Chronic Kidney



                                                                 Disease: Stage 

II



                                                                 Mild Decrease i

n



                                                                 GFR 60-90 Stage

 III



                                                                 Moderate Decrea

se



                                                                 in GFR 30-59 St

age



                                                                 IV Severe Decre

ase



                                                                 in GFR 15-29 St

age



                                                                 V Kidney Failur

e



                                                                 <15

 

             CREATININE (test code 0.97 mg/dL   0.55-1.30    N            



             = CREAT)                                            

 

             TOTAL PROTEIN (test 6.9 g/dL     6.4-8.2      N            



             code = PROT)                                        

 

             ALBUMIN (test code = 3.7 g/dL     3.4-5.0      N            



             ALB)                                                

 

             GLOBULIN (test code = 3.2 g/dL     2.2-4.2      N            



             GLOB)                                               

 

             ALBUMIN/GLOBULIN RATIO 1.2          0.7-2.0      N            



             (test code = A/G)                                        

 

             CALCIUM (test code = 9.1 mg/dL    8.2-10.1     N            



             CA)                                                 

 

             BILIRUBIN TOTAL (test 0.50 mg/dL   0.2-1.00     N            



             code = BILT)                                        

 

             SGOT/AST (test code = 12.0 U/L     15-37        L            



             AST)                                                

 

             SGPT/ALT (test code = 22.0 U/L     12-78        N            Please

 note new



             ALT)                                                normal range.

 

             ALKALINE PHOSPHATASE 83 U/L              N            



             TOTAL (test code =                                        



             ALKP)                                               



PROTHROMBIN [id number] 15:19:00





             Test Item    Value        Reference Range Interpretation Comments

 

             PROTHROMBIN TIME 11.1 secs    10.1-12.5    N            



             PATIENT (test code =                                        



             PTP)                                                

 

             INTERNATIONAL NORMAL 0.99         <2.0                      RECOMME

NDED THERAPEUTIC



             RATIO (test code =                                        RANGE FOR

 ORAL



             INR)                                                ANTICOAGULANTTR

EATMENT:



                                                                 CONDITION INRPr

ophylaxis



                                                                 of venous throm

bosis in



                                                                 2.0 - 3.0 high-

risk



                                                                 medical or surg

ical



                                                                 patientsTreatme

nt of



                                                                 venous thrombos

is 2.0 -



                                                                 3.0Prevention o

f



                                                                 embolism 2.0 -



                                                                 3.0Prevention o

f



                                                                 recurrent embol

ism, or



                                                                 3.0 - 4.5 patie

nts with



                                                                 mechanical pros

thetic



                                                                 intravascular v

varela



IS PATIENT ON ANTICOAGULANTS ? NHas Lab been notified if Patient is on Heparin 
Drip? NOIf Yes, orderCBC, OCCULT BLOOD, PT every other day NTHROMBOPLASTIN TIME 
[id number] 15:19:00





             Test Item    Value        Reference Range Interpretation Comments

 

             PTT ACTIVATED (test code = APTT) 31.8 secs    24.9-37.0    N       

     



IS PATIENT ON ANTICOAGULANTS ? NHas Lab been notified if Patient is on Heparin 
Drip? NOIf Yes, orderCBC, OCCULT BLOOD, PT every other day NCBC W/AUTO DIFF
2020 15:08:00





             Test Item    Value        Reference Range Interpretation Comments

 

             WHITE BLOOD CELL (test code = WBC) 10.7 K/mm3   5.7-10.5     H     

       

 

             RED BLOOD CELL (test code = RBC) 5.14 M/mm3   4.2-5.4      N       

     

 

             HEMOGLOBIN (test code = HGB) 15.5 g/dL    12-16        N           

 

 

             HEMATOCRIT (test code = HCT) 46.1 %       37-47        N           

 

 

             MEAN CELL VOLUME (test code = MCV) 90 fL        80-98        N     

       

 

             MEAN CELL HGB (test code = MCH) 30.2 pg      27-34        N        

    

 

             MEAN CELL HGB CONCENTRATION (test 33.6 g/dL    30.8-34.1    N      

      



             code = MCHC)                                        

 

             RED CELL DISTRIBUTION WIDTH (test 13.2 %       11-16        N      

      



             code = RDW)                                         

 

             PLT (test code = PLT) 248 K/mm3    130-400      N            

 

             MEAN PLATELET VOLUME (test code = 10.6 fL      8.9-12.1     N      

      



             MPV)                                                

 

             NEUTROPHIL % (test code = NT%) 75.6 %       45-70        H         

   

 

             LYMPHOCYTE % (test code = LY%) 17.0 %       20-40        L         

   

 

             MONOCYTE % (test code = MO%) 5.5 %        3-10         N           

 

 

             EOSINOPHIL % (test code = EO%) 1.1 %        1-5          N         

   

 

             BASOPHIL % (test code = BA%) 0.5 %        0.0-1.1      N           

 

 

             NEUTROPHIL # (test code = NT#) 8.05 K/mm3   2.00-7.50    H         

   

 

             LYMPHOCYTE # (test code = LY#) 1.81 K/mm3   1.50-4.00    N         

   

 

             MONOCYTE # (test code = MO#) 0.59 K/mm3   0.2-0.8      N           

 

 

             EOSINOPHIL # (test code = EO#) 0.12 K/mm3   0.04-0.4     N         

   

 

             BASOPHIL # (test code = BA#) 0.05 K/mm3   0.02-0.10    N           

 

 

             MANUAL DIFF REQUIRED (test code = NO           MANUAL DIFF         

      



             MDIFF)                                              

 

             NUCLEATED RED BLOOD CELL (test 0 %          0-0          N         

   



             code = NRBC)                                        



- XR FLUORO [id number] 10:14:00 Patient Name: AILEEN GARG Unit No: 
[id number] EXAMS: CPT CODE: [id number] XR FLUORO NDL [id number] FLUOROSCOPICALLY 
GUIDED ASPIRATION OF THE LEFT KNEE COMMENT: After informed consent was obtained 
a needle was placed in the knee joint with fluoroscopic guidance. Its position 
was confirmed with an AP radiograph. 0.1 minutes of fluoroscopy time was 
utilized.  9 mL of blood-tinged fluid was aspirated and sent for analysis. No 
immediate complications were encountered. ** Electronically Signed by Zach Juarez MD **  ** on 2019 at 1014 ** Reported and signed by: Zach Juarez MD  CC: Son Romero MD Technologist: Irma Klein RTDiana(R)  
Transcribed D/T: 2019 (1014) t.SDR.L [facility] NAME: 
AILEEN GARG [address] [facility]  PHYS: Son Briggs MD : 19
[birth year] AGE: 59 SEX: M [place], [place] [address] ACCT NO: [id number] LOC: Y.RAD PHONE #:
[phone] EXAM DATE: 2019 STATUS: DEP CLI FAX #: [phone] RAD #: 
D/C DT PAGE 1 Signed Report  Patient Name:AILEEN GARG Unit No: [id number] 
EXAMS: CPT CODE: [id number] XR FLUORO NDL  81542 (Continued) OrigPrint D/T: S: 
2019 (1017)  [facility] NAME: AILEEN GARG [address] PHYS: Son Briggs MD  : 1960 AGE: 59 SEX: M [place], [place] [address] ACCT NO: [id number] LOC: Y.RAD PHONE #: [phone] EXAM DATE: 
2019 STATUS: DEP CLI  FAX #: [phone] RAD #: D/C DT PAGE 2 Signed 
ReportGLYCOSYLATED HEMOGLOBIN (HA1C)2019 21:01:00





             Test Item    Value        Reference Range Interpretation Comments

 

             GLYCOSYLATED 12.0 %       4.8-5.9      H            Any condition t

hat



             HEMOGLOBIN (HA1C)                                        shortens e

rythocyte



             (test code = GLYHGB)                                        surviva

l or decreasesmean



                                                                 erythrocyte age

 (e.g.,



                                                                 recovery from a

cute blood



                                                                 loss,hemolytic 

anemia)



                                                                 will falsely lo

wer HGBA1c



                                                                 resultsregardle

ss of the



                                                                 method used. HG

BA1c



                                                                 results from pa

melindaswith



                                                                 HbSS, HbCC, and

 HbSc must



                                                                 be interpreted 

with



                                                                 cautiongiven th

e



                                                                 pathological pr

ocesses,



                                                                 including anemi

a,increased



                                                                 red cell turnov

er,



                                                                 transfusion req

uirements,



                                                                 thatadversely i

mpact



                                                                 HGBA1c as a mar

ker of



                                                                 long-term glyce

miccontrol.



                                                                 Alternative for

ms of



                                                                 testing such as



                                                                 fructosaminesho

uld be



                                                                 considered for 

these



                                                                 patients.



GLYCOSYLATED HEMOGLOBIN (HA1C)2019 21:01:00





             Test Item    Value        Reference Range Interpretation Comments

 

             GLYCOSYLATED 12.0 %       4.8-5.9      H            Any condition t

hat



             HEMOGLOBIN (HA1C)                                        shortens e

rythocyte



             (test code = GLYHGB)                                        surviva

l or decreasesmean



                                                                 erythrocyte age

 (e.g.,



                                                                 recovery from a

cute blood



                                                                 loss,hemolytic 

anemai)



                                                                 will falsely lo

wer HGBA1c



                                                                 resultsregardle

ss of the



                                                                 method used. HG

BA1c



                                                                 results frompat

ients with



                                                                 HbSS, HbCC and 

HbSc must



                                                                 be interpreted 

withcaution



                                                                 given the patho

logical



                                                                 processes, incl

uding



                                                                 anemia,increase

d red cell



                                                                 turnover, trans

fusion



                                                                 requirements,



                                                                 thatadversely i

mpact



                                                                 HGBA1c as a mar

ker of



                                                                 long-term glyce

miccontrol.



                                                                 Alternative for

ms of



                                                                 testing such as



                                                                 fructosaminesho

uld be



                                                                 considered for 

these



                                                                 patients.Any co

ndition



                                                                 that shortens e

rythocyte



                                                                 survival or dec

reasesmean



                                                                 erythrocyte age

 (e.g.,



                                                                 recovery from a

cute blood



                                                                 loss,hemolytic 

anemia)



                                                                 will falsely lo

wer HGBA1c



                                                                 resultsregardle

ss of the



                                                                 method used. HG

BA1c



                                                                 results from jm mack



                                                                 HbSS, HbCC, and

 HbSc must



                                                                 be interpreted 

with



                                                                 cautiongiven th

e



                                                                 pathological pr

ocesses,



                                                                 including anemi

a,increased



                                                                 red cell turnov

er,



                                                                 transfusion req

uirements,



                                                                 thatadversely i

mpact



                                                                 HGBA1c as a mar

ker of



                                                                 long-term glyce

miccontrol.



                                                                 Alternative for

ms of



                                                                 testing such as



                                                                 fructosaminesho

uld be



                                                                 considered for 

these



                                                                 patients.DONE A

T: [facility]



                                                                 [address] [facility],



                                                                 [place], 

[address]



SYNOVIAL FLD CELL [id number] 17:55:00





             Test Item    Value        Reference Range Interpretation Comments

 

             SYNOVIAL FLD XANTHOCHROMIC LT. YELLOW   A            



             COLOR (test code                                        



             = COLSY)                                            

 

             SYNOVIAL FLD OPAQUE       CLEAR                     



             APPEARANCE (test                                        



             code = APPSY)                                        

 

             SYNOVIAL FLD 2.0 mL                                 



             VOLUME (test code                                        



             = VOLSY)                                            

 

             SYNOVIAL FLD .000 /MM3 0-200        H            



             (test code =                                        



             WBCSY)                                              

 

             SYNOVIAL FLD RBC 64043.000 /mm3 0-2          H            NOTE: An 

automated



             (test code =                                        method is now b

eing



             RBCSY)                                              used to



                                                                 determinesynovi

al fluid



                                                                 WBC and RBC cou

nts. The



                                                                 differential wi

llstill



                                                                 be performed ma

michelle.

 

             SYNOVIAL FLD POLY 12 %         0-25         N            



             (test code =                                        



             POLYSY)                                             

 

             SYNOVIAL FLD 68 %         0-78         N            



             LYMPHOCYTE (test                                        



             code = LYMPHSY)                                        

 

             SYNOVIAL FLD 14 %         0-71         N            



             MONOCYTE (test                                        



             code = MONOSY)                                        

 

             SYNOVIAL FLD 1 %          0-3          N            



             MACROPHAGE (test                                        



             code = MACSY)                                        

 

             SYNOVIAL FLD SEE BELOW                              SYNOVIAL LINING

 CELL: 5



             OTHER CELL (test                                        



             code = OTHERSY)                                        



SPECIMEN COMMENT: ASPIRATED PER  LEFT KNEE CELLCOUNTSYNOVIAL FLD CELL 
[id number] 14:13:00





             Test Item    Value        Reference Range Interpretation Comments

 

             SYNOVIAL FLD COLOR              LT. YELLOW   A            



             (test code =                                        



             COLSY)                                              

 

             SYNOVIAL FLD              CLEAR                     



             APPEARANCE (test                                        



             code = APPSY)                                        

 

             SYNOVIAL FLD  mL                                    



             VOLUME (test code                                        



             = VOLSY)                                            

 

             SYNOVIAL FLD .000 /MM3 0-200        H            



             (test code =                                        



             WBCSY)                                              

 

             SYNOVIAL FLD RBC 63012.000    0-2          H            NOTE: An au

tomated



             (test code = /mm3                                   method is now b

eing used



             RBCSY)                                              to determinesyn

ovial



                                                                 fluid WBC and R

BC



                                                                 counts. The dif

ferential



                                                                 willstill be pe

rformed



                                                                 manually.



SPECIMEN COMMENT: ASPIRATED PER  LEFT KNEE  CELLCOUNTCBC W/AUTO DIFF
2019 13:30:00





             Test Item    Value        Reference Range Interpretation Comments

 

             WHITE BLOOD CELL (test code = WBC) 12.0 K/mm3   5.7-10.5     H     

       

 

             RED BLOOD CELL (test code = RBC) 4.98 M/mm3   4.2-5.4      N       

     

 

             HEMOGLOBIN (test code = HGB) 14.9 g/dL    12-16        N           

 

 

             HEMATOCRIT (test code = HCT) 45.9 %       37-47        N           

 

 

             MEAN CELL VOLUME (test code = MCV) 92 fL        80-98        N     

       

 

             MEAN CELL HGB (test code = MCH) 29.9 pg      27-34        N        

    

 

             MEAN CELL HGB CONCENTRATION (test 32.5 g/dL    30.8-34.1    N      

      



             code = MCHC)                                        

 

             RED CELL DISTRIBUTION WIDTH (test 12.8 %       11-16        N      

      



             code = RDW)                                         

 

             PLT (test code = PLT) 270 K/mm3    130-400      N            

 

             MEAN PLATELET VOLUME (test code = 11.3 fL      8.9-12.1     N      

      



             MPV)                                                

 

             NEUTROPHIL % (test code = NT%) 79.4 %       45-70        H         

   

 

             LYMPHOCYTE % (test code = LY%) 13.1 %       20-40        L         

   

 

             MONOCYTE % (test code = MO%) 5.2 %        3-10         N           

 

 

             EOSINOPHIL % (test code = EO%) 1.7 %        1-5          N         

   

 

             BASOPHIL % (test code = BA%) 0.3 %        0.0-1.1      N           

 

 

             NEUTROPHIL # (test code = NT#) 9.49 K/mm3   2.00-7.50    H         

   

 

             LYMPHOCYTE # (test code = LY#) 1.57 K/mm3   1.50-4.00    N         

   

 

             MONOCYTE # (test code = MO#) 0.62 K/mm3   0.2-0.8      N           

 

 

             EOSINOPHIL # (test code = EO#) 0.20 K/mm3   0.04-0.4     N         

   

 

             BASOPHIL # (test code = BA#) 0.03 K/mm3   0.02-0.10    N           

 

 

             MANUAL DIFF REQUIRED (test code = NO           MANUAL DIFF         

      



             MDIFF)                                              

 

             NUCLEATED RED BLOOD CELL (test 0 %          0-0          N         

   



             code = NRBC)                                        



SED [id number] 13:30:00





             Test Item    Value        Reference Range Interpretation Comments

 

             SED RATE (test code = SEDW) 22 mm/hr     0-15         H            



COMPREHENSIVE METABOLIC [id number] 13:29:00





             Test Item    Value        Reference Range Interpretation Comments

 

             SODIUM (test code = 137 mmol/L   136-145      N            



             NA)                                                 

 

             POTASSIUM (test code 4.6 mmol/L   3.5-5.1      N            



             = K)                                                

 

             CHLORIDE (test code = 100.0 mmol/L        N            



             CL)                                                 

 

             CARBON DIOXIDE (test 21.9 mmol/L  21-32        N            



             code = CO2)                                         

 

             GLUCOSE (test code = 445 mg/dL           H            VERIFIE

D BY REPEAT



             GLU)                                                ANALYSIS.CRITIC

AL



                                                                 VALUE CALLED TO



                                                                 CHANDA WITH



                                                                 '



                                                                 OFFICEREAD BACK

 &



                                                                 CONFIRMED? YESB

Y



                                                                 F.LAB.AEG 



                                                                 [id number]

 

             BLOOD UREA NITROGEN 20 mg/dL     7-18         H            



             (test code = BUN)                                        

 

             GLOMERULAR FILTRATION 67.3         >60                       Unit o

f measure:



             RATE (test code =                                        mL/min/1.7

3



             GFR)                                                [id number]



                                                                 Range:Healthy A

dults



                                                                 >90 mL/min/1.73

 m2



                                                                 For Chronic Kid

loretta



                                                                 Disease: Stage 

II



                                                                 Mild Decrease i

n GFR



                                                                 60-90 Stage III



                                                                 Moderate Decrea

se in



                                                                 GFR 30-59 Stage

 IV



                                                                 Severe Decrease

 in



                                                                 GFR 15-29 Stage

 V



                                                                 Kidney Failure 

<15

 

             CREATININE (test code 1.12 mg/dL   0.55-1.30    N            



             = CREAT)                                            

 

             TOTAL PROTEIN (test 6.6 g/dL     6.4-8.2      N            



             code = PROT)                                        

 

             ALBUMIN (test code = 3.4 g/dL     3.4-5.0      N            



             ALB)                                                

 

             GLOBULIN (test code = 3.2 g/dL     2.2-4.2      N            



             GLOB)                                               

 

             ALBUMIN/GLOBULIN 1.1          0.7-2.0      N            



             RATIO (test code =                                        



             A/G)                                                

 

             CALCIUM (test code = 8.5 mg/dL    8.2-10.1     N            



             CA)                                                 

 

             BILIRUBIN TOTAL (test 0.58 mg/dL   0.2-1.00     N            



             code = BILT)                                        

 

             SGOT/AST (test code = 11.0 U/L     15-37        L            



             AST)                                                

 

             SGPT/ALT (test code = 23.0 U/L     12-78        N            Please

 note new



             ALT)                                                normal range.

 

             ALKALINE PHOSPHATASE 100 U/L             N            



             TOTAL (test code =                                        



             ALKP)                                               



C REACTIVE [id number] 13:09:00





             Test Item    Value        Reference Range Interpretation Comments

 

             C REACTIVE PROTEIN (test code = 3.0 mg/dL    <0.9                  

    



             CRP)                                                



PROTHROMBIN [id number] 12:53:00





             Test Item    Value        Reference Range Interpretation Comments

 

             PROTHROMBIN TIME 10.9 secs    10.1-12.5    N            



             PATIENT (test code =                                        



             PTP)                                                

 

             INTERNATIONAL NORMAL 0.97         <2.0                      RECOMME

NDED THERAPEUTIC



             RATIO (test code =                                        RANGE FOR

 ORAL



             INR)                                                ANTICOAGULANTTR

EATMENT:



                                                                 CONDITION INRPr

ophylaxis



                                                                 of venous throm

bosis in



                                                                 2.0 - 3.0 high-

risk



                                                                 medical or surg

ical



                                                                 patientsTreatme

nt of



                                                                 venous thrombos

is 2.0 -



                                                                 3.0Prevention o

f



                                                                 embolism 2.0 -



                                                                 3.0Prevention o

f



                                                                 recurrent embol

ism, or



                                                                 3.0 - 4.5 patie

nts with



                                                                 mechanical pros

thetic



                                                                 intravascular v

varela



IS PATIENT ON ANTICOAGULANTS ? NHas Lab been notified if Patient is on Heparin 
Drip? NOTHROMBOPLASTIN TIME [id number] 12:53:00





             Test Item    Value        Reference Range Interpretation Comments

 

             PTT ACTIVATED (test code = APTT) 31.6 secs    24.9-37.0    N       

     



IS PATIENT ON ANTICOAGULANTS ? NHas Lab been notified if Patient is on Heparin 
Drip? NOCBC W/AUTO [id number] 12:48:00





             Test Item    Value        Reference Range Interpretation Comments

 

             WHITE BLOOD CELL (test code = WBC) 12.0 K/mm3   5.7-10.5     H     

       

 

             RED BLOOD CELL (test code = RBC) 4.98 M/mm3   4.2-5.4      N       

     

 

             HEMOGLOBIN (test code = HGB) 14.9 g/dL    12-16        N           

 

 

             HEMATOCRIT (test code = HCT) 45.9 %       37-47        N           

 

 

             MEAN CELL VOLUME (test code = MCV) 92 fL        80-98        N     

       

 

             MEAN CELL HGB (test code = MCH) 29.9 pg      27-34        N        

    

 

             MEAN CELL HGB CONCENTRATION (test 32.5 g/dL    30.8-34.1    N      

      



             code = MCHC)                                        

 

             RED CELL DISTRIBUTION WIDTH (test 12.8 %       11-16        N      

      



             code = RDW)                                         

 

             PLT (test code = PLT) 270 K/mm3    130-400      N            

 

             MEAN PLATELET VOLUME (test code = 11.3 fL      8.9-12.1     N      

      



             MPV)                                                

 

             NEUTROPHIL % (test code = NT%) 79.4 %       45-70        H         

   

 

             LYMPHOCYTE % (test code = LY%) 13.1 %       20-40        L         

   

 

             MONOCYTE % (test code = MO%) 5.2 %        3-10         N           

 

 

             EOSINOPHIL % (test code = EO%) 1.7 %        1-5          N         

   

 

             BASOPHIL % (test code = BA%) 0.3 %        0.0-1.1      N           

 

 

             NEUTROPHIL # (test code = NT#) 9.49 K/mm3   2.00-7.50    H         

   

 

             LYMPHOCYTE # (test code = LY#) 1.57 K/mm3   1.50-4.00    N         

   

 

             MONOCYTE # (test code = MO#) 0.62 K/mm3   0.2-0.8      N           

 

 

             EOSINOPHIL # (test code = EO#) 0.20 K/mm3   0.04-0.4     N         

   

 

             BASOPHIL # (test code = BA#) 0.03 K/mm3   0.02-0.10    N           

 

 

             MANUAL DIFF REQUIRED (test code = NO           MANUAL DIFF         

      



             MDIFF)                                              

 

             NUCLEATED RED BLOOD CELL (test 0 %          0-0          N         

   



             code = NRBC)                                        



SED [id number] 12:48:00





             Test Item    Value        Reference Range Interpretation Comments

 

             SED RATE (test code = SEDW)  mm/hr       0-15

## 2023-10-11 NOTE — EDPHYS
Physician Documentation                                                                           

 [facility]                                                                 

Name: Constantino Mcdonough                                                                               

Age: 63 yrs                                                                                       

Sex: Male                                                                                         

: 1960                                                                                   

MRN: [id number]                                                                                   

Arrival Date: 10/11/2023                                                                          

Time: 11:43                                                                                       

Account#: [id number]                                                                            

Bed 15                                                                                            

Private MD:                                                                                       

ED Physician Ignacio Liang                                                                         

HPI:                                                                                              

10/11                                                                                             

12:27 This 63 yrs old Male presents to ER via Ambulatory with complaints of Boil.             ms3 

12:27 63-year-old male with past medical history of hypertension, diabetes, hyperlipidemia    ms3 

      presents with his wife for groin abscess. Patient states symptoms have been present for     

      10 days. Patient denies fevers or chills. Patient denies any alleviating or inciting        

      factors..                                                                                   

                                                                                                  

Historical:                                                                                       

- Allergies:                                                                                      

11:52 No Known Allergies;                                                                     ll1 

- PMHx:                                                                                           

11:52 Hypertensive disorder; Diabetes mellitus; Hypercholesterolemia;                         ll1 

- PSHx:                                                                                           

11:52 hernia repair x 2; knee replacement;                                                    ll1 

                                                                                                  

- Immunization history:: Adult Immunizations up to date.                                          

- Social history:: Smoking status: Patient denies any tobacco usage or history of.                

                                                                                                  

                                                                                                  

ROS:                                                                                              

12:27 Constitutional: Negative for fever, and chills. Neck: Negative for injury, pain, and    ms3 

      swelling, Cardiovascular: Negative for chest pain, and palpitations. Respiratory:           

      Negative for shortness of breath, cough, wheezing, and pleuritic chest pain,                

      Abdomen/GI: Negative for abdominal pain, nausea, vomiting, diarrhea, and constipation,      

      MS/Extremity: Negative for injury and deformity,                                            

12:27 Skin: Positive for cellulitis, boil,                                                        

12:27 All other systems are negative,                                                             

                                                                                                  

Exam:                                                                                             

12:27 Constitutional:  This is a well developed, well nourished patient who is awake, alert,  ms3 

      and in no acute distress. Head/Face:  Normocephalic, atraumatic. Neck:  Trachea             

      midline, no cervical lymphadenopathy.  Supple, full range of motion without nuchal          

      rigidity, or vertebral point tenderness.  No Meningismus. Chest/axilla:  Normal chest       

      wall appearance and motion.  Nontender with no deformity.   Cardiovascular:  Regular        

      rate and rhythm with a normal S1 and S2.  No gallops, murmurs, or rubs.  Normal PMI, no     

      JVD.  No pulse deficits. Respiratory:  Lungs have equal breath sounds bilaterally,          

      clear to auscultation and percussion.  No rales, rhonchi or wheezes noted.  No              

      increased work of breathing, no retractions or nasal flaring. Abdomen/GI:  Soft,            

      non-tender, with normal bowel sounds.  No distension or tympany.  No guarding or            

      rebound.  No evidence of tenderness throughout. MS/ Extremity:  Pulses equal, no            

      cyanosis.  Neurovascular intact.  Full, normal range of motion.                             

12:27 Skin: cellulitis, that is moderate, well demarcated, on the  scrotum,                       

13:02 ECG was reviewed by the Attending Physician.                                            ms3 

                                                                                                  

Vital Signs:                                                                                      

11:53  / 100; Pulse 116; Resp 20; Temp 98.2; Pulse Ox 95% ; Weight 129.27 kg; Height 5  ll1 

      ft. 10 in. ; Pain 10/10;                                                                    

12:24  / 92; Pulse 115; Resp 19 S; Temp 98.2; Pulse Ox 93% on R/A; Pain 10/10;          iw  

13:00  / 78; Pulse 102; Resp 16; Pulse Ox 95% ;                                         db  

14:00  / 93; Pulse 101; Resp 16; Pulse Ox 95% on R/A;                                   db  

11:53 Body Mass Index 40.89 (129.27 kg, 177.8 cm)                                             ll1 

11:53 Pain Scale: Adult                                                                       ll1 

12:24 Pain Scale: Adult                                                                       iw  

                                                                                                  

MDM:                                                                                              

12:02 Patient medically screened.                                                             ms3 

12:27 Differential diagnosis: abscess, cellulitis, Alessia's gangrene.                       ms3 

13:47 Data reviewed: vital signs, nurses notes, and as a result, I will admit patient.        ms3 

      Consideration of Admission/Observation Patient was admitted/placed on observation.          

      Management of patient was discussed with the following: Hospitalist: BRADLEY Cedeno NP on     

      behalf of Dr Page. I considered the following discharge prescriptions or medication        

      management in the emergency department Medications were administered in the Emergency       

      Department. See MAR. Independent interpretation of the following test(s) in the             

      Emergency Department CT Scan: My interpretation is CT Pelvis images reviewed by me          

      showing stranding and fluid collection in right perineum/ scrotum. Care significantly       

      affected by the following chronic conditions: Diabetes, Hypertension. Counseling: I had     

      a detailed discussion with the patient and/or guardian regarding the historical points,     

      exam findings, and any diagnostic results supporting the discharge/admit diagnosis, lab     

      results, radiology results, the need for further work-up and treatment in the hospital.     

      ED course: Discussed case with Dr. Yin and he will take patient to operating room       

      for drainage of abscess. Discussed plan with patient and his wife and they understand       

      and agree with plan. All questions were answered.                                           

                                                                                                  

10/11                                                                                             

11:59 Order name: Blood Culture Adult (2)                                                     ms3 

10/11                                                                                             

11:59 Order name: CBC with Diff; Complete Time: 12:55                                         ms3 

10/11                                                                                             

11:59 Order name: CMP; Complete Time: 12:55                                                   ms3 

10/11                                                                                             

11:59 Order name: Lactate w/ 2H reflex if indic.; Complete Time: 12:55                        ms3 

10/11                                                                                             

11:59 Order name: Protime (+inr); Complete Time: 12:55                                        ms3 

10/11                                                                                             

11:59 Order name: Ptt, Activated; Complete Time: 12:55                                        ms3 

10/11                                                                                             

12:01 Order name: CT Pelvis w cont; Complete Time: 13:19                                      ms3 

10/11                                                                                             

11:59 Order name: EKG; Complete Time: 12:00                                                   ms3 

10/11                                                                                             

11:59 Order name: Accucheck; Complete Time: 12:24                                             ms3 

10/11                                                                                             

11:59 Order name: Cardiac monitoring; Complete Time: 12:24                                    ms3 

10/11                                                                                             

11:59 Order name: EKG - Nurse/Tech; Complete Time: 12:24                                      ms3 

10/11                                                                                             

11:59 Order name: IV Saline Lock - Large Bore; Complete Time: 12:24                           ms3 

10/11                                                                                             

11:59 Order name: Labs collected and sent; Complete Time: 12:24                               ms3 

10/11                                                                                             

11:59 Order name: O2 Per Protocol; Complete Time: 12:24                                       ms3 

10/11                                                                                             

11:59 Order name: O2 Sat Monitoring; Complete Time: 12:24                                     ms3 

10/11                                                                                             

11:59 Order name: Vital Signs; Complete Time: 12:24                                           ms3 

                                                                                                  

EC:02 Rate is 113 beats/min. Rhythm is regular. QRS Axis is Normal. SC interval is normal.    ms3 

      QRS interval is normal. Clinical impression: NSR w/ Non-specific ST/T Changes.              

      Interpreted by me. Reviewed by me.                                                          

                                                                                                  

Administered Medications:                                                                         

12:14 Drug: NS 0.9% IV 1000 ml IV at 1 bolus Per protocol; 1000 mL bolus Route: IV; Rate: 1   iw  

      bolus; Site: right hand;                                                                    

14:17 Follow up: Response: No adverse reaction; IV Status: Completed infusion; IV Intake:     db  

      1000ml                                                                                      

12:38 Drug: Piperacillin-Tazobactam IVPB 3.375 grams IVPB once over 60 mins; (mix in    iw  

      mL) Route: IVPB; Infused Over: 60 mins; Site: right hand;                                   

13:28 Follow up: Response: No adverse reaction; IV Status: Completed infusion; IV Intake:     db  

      100ml                                                                                       

12:38 Drug: morphine IVP or IV 4 mg IVP once over 4 mins Route: IVP; Infused Over: 4 mins;    iw  

      Site: right hand;                                                                           

14:17 Follow up: Response: No adverse reaction                                                db  

                                                                                                  

                                                                                                  

Disposition Summary:                                                                              

10/11/23 13:47                                                                                    

Hospitalization Ordered                                                                           

 Notes:       Hospitalization Status: Inpatient Admission                                           
  ms3

      [facility]                                                 ms3 

      Condition: Stable                                                                       ms3 

      Problem: new                                                                            ms3 

      Symptoms: are unchanged                                                                 ms3 

      Bed/Room Type: Standard                                                                 ms3 

      Room Assignment:                                                                        ms3 

      Provider: Derian Page(10/11/23 13:48)                                                ms3 

      Diagnosis                                                                                   

        - Severe sepsis without septic shock                                                  ms3 

        - Perineal Abscess                                                                    ms3 

      Discharge Instructions:                                                                     

        - Discharge Summary Sheet                                                             bd  

      Forms:                                                                                      

        - SBAR form                                                                           bd  

        - Medication Reconciliation Form                                                      ms3 

        - Leadership Thank You Letter                                                         ms3 

Critical care time excluding procedures:                                                          

13:47 Critical care time: Bedside Care: 30 minutes, Consultation: 10 minutes, Family          ms3 

      Intervention: 5 minutes. Total time: 45 minutes                                             

                                                                                                  

Signatures:                                                                                       

Dispatcher [employer]                           Janis King RN RN                                                      

Sulaiman Romo RN                       RN   1                                                  

Ignacio Liang DO                        DO   ms3                                                  

Zara Singleton RN   db                                                   

                                                                                                  

Corrections: (The following items were deleted from the chart)                                    

13:48 13:47 Seth Lee ms3                                                                 ms3 

                                                                                                  

**************************************************************************************************

## 2023-10-11 NOTE — P.OP
Preoperative diagnosis: Perineal abscess


Postoperative diagnosis: The same


Primary procedure: Incision, drainage, and sharp debridement of 2 perineal 

abscesses


Anesthesia: General


Estimated blood loss: Less than 10 cc


Specimen: Cultures both aerobic and anaerobic were taken, no specimen was sent


Operative Technique: 





The patient brought the operating room and placed supine on the table.  After 

the induction of adequate general endotracheal anesthesia and control of the 

airway the patient was placed in lithotomy position.  The area of the perineum 

and scrotum was prepped with a Betadine solution, he was draped in usual aseptic

manner.  Attention was turned towards the right upper thigh crease and junction 

with the scrotum on that side.  Approximately 3 fingers breath above this line 

we could feel an obviously fluctuant area.  We could see purulent material 

discharging to drain from a small opening in the skin.  This was probed with a 

lacrimal probe.  This was found to be a large abscess cavity.  By applying 

traction with the probe we were able to cut down and it ensure we did not injure

injure any of the underlying structures.  We encountered a large amount of thick

purulent blood-tinged pus.  This was aspirated using the Yankauer suction, and 

cultures both aerobic and anaerobic were taken.  The loculations of this abscess

cavity were now broken down.  A cutting surgical curette as well as 11 blade was

used to sharply debride the cavity.  Just below the skin crease there was 

another area again underneath the candidate subcutaneously through which we made

a small skin incision.  Using a lacrimal probe we found that this also 

communicated with this large cavity that we had created at the lateral side of 

the scrotum sac.  This was opened and then once again a Penrose drain was placed

into the wound and brought out superficially to keep this open and draining 

during the postoperative period.





At the end of the procedure the patient was in a stable condition was sent to 

the recovery room.  Needle sponge instrument count were correct.  2 drains were 

left in place.  The patient remained stable throughout our procedure, and does 

not require an ICU bed.


Complications: None


Drain(s): Other (2 separate half inch Penrose drains)


Transferred to: Recovery Room


Condition: Good

## 2023-10-11 NOTE — P.HP
Date of Service: 10/11/23





PC: I was asked to see this 63-year-old male in regards to a perineal abscess.





HPC: Patient has been at home, for the last 10 days or so, with pain between his

scrotum and his anus.  He has noticed some swelling in this area.  Has been 

treating it with some over-the-counter medications.  Started not feeling well 

today and came in for diagnosis and treatment.





PSHx: Previous orthopedic procedures, previous I&D of abscesses





PMHx: Diabetes





Social Hx: No known allergies





Sys R: No cough, wheeze, shortness of breath.  No chest pain or palpitations.  

Denies any urinary issues.  No change in bowel habit.











O/E: Awake alert vital signs are stable clinically does not appear to be septic





HEENT: Not jaundiced





Chest: Air entry equal bilaterally





Abd: Abdomen is soft, perineal area on the right side has swelling.  This was 

not examined well due to patient's discomfort.





Data: CT scan straits a large abscess on the right perineal area.





Impression: Abscess





Plan: I will taken the operating room for incision, drainage, sharp debridement 

of this abscess.  The risks of this procedure have been discussed.  The 

possibility of bleeding, infection, injury to surrounding structures were 

explained.  The possibility of recurrence and need for further surgeries was 

described.  He understands and wants to proceed.

## 2023-10-11 NOTE — RAD REPORT
EXAM DESCRIPTION:  CT - Pelvis W/Cont - 10/11/2023 1:01 pm

 

CLINICAL HISTORY:  groin abscess

Pain and swelling

 

COMPARISON:  <Comparisons>

 

TECHNIQUE:  All CT scans are performed using dose optimization technique as appropriate and may inclu
de automated exposure control or mA/KV adjustment according to patient size.

 

FINDINGS:  There is an irregular thick walled fluid collection which is amorphous and measures approx
imately 10 x 4 cm. This is along the right aspect of the perineum and inferior scrotum soft tissues. 
Thickening of the left scrotal soft tissue is also seen. This collection is seen to extend to the rig
ht scrotal lateral skin surface.

 

Intrapelvic extension of this is not seen. No intrapelvic mass or hematoma. Prominent sigmoid diverti
culosis coli.

 

IMPRESSION:  Large irregular abscess collection seen in the right aspect of the perineum. There is as
sociated left-sided disc scrotal and perineal soft tissue thickening as well. This appears to communi
renetta anterolaterally with the skin surface but does not extend into the intrapelvic contents.

## 2023-10-12 LAB
BUN BLD-MCNC: 16 MG/DL (ref 7–18)
GLUCOSE SERPLBLD-MCNC: 276 MG/DL (ref 74–106)
HCT VFR BLD CALC: 39 % (ref 39.6–49)
LYMPHOCYTES # SPEC AUTO: 1.8 K/UL (ref 0.7–4.9)
MAGNESIUM SERPL-MCNC: 2.2 MG/DL (ref 1.6–2.4)
MCV RBC: 93.7 FL (ref 80–100)
PMV BLD: 8.3 FL (ref 7.6–11.3)
POTASSIUM SERPL-SCNC: 4.4 MEQ/L (ref 3.5–5.1)
RBC # BLD: 4.16 M/UL (ref 4.33–5.43)
WBC # BLD AUTO: 14.2 THOU/UL (ref 4.3–10.9)

## 2023-10-12 RX ADMIN — Medication SCH ML: at 21:57

## 2023-10-12 RX ADMIN — SODIUM CHLORIDE SCH: 0.9 INJECTION, SOLUTION INTRAVENOUS at 01:00

## 2023-10-12 RX ADMIN — HUMAN INSULIN SCH UNIT: 100 INJECTION, SOLUTION SUBCUTANEOUS at 08:13

## 2023-10-12 RX ADMIN — SODIUM CHLORIDE SCH MLS: 9 INJECTION, SOLUTION INTRAVENOUS at 21:57

## 2023-10-12 RX ADMIN — HUMAN INSULIN SCH UNIT: 100 INJECTION, SOLUTION SUBCUTANEOUS at 17:10

## 2023-10-12 RX ADMIN — MORPHINE SULFATE PRN MG: 4 INJECTION, SOLUTION INTRAMUSCULAR; INTRAVENOUS at 06:13

## 2023-10-12 RX ADMIN — MORPHINE SULFATE PRN MG: 4 INJECTION, SOLUTION INTRAMUSCULAR; INTRAVENOUS at 02:18

## 2023-10-12 RX ADMIN — SODIUM CHLORIDE SCH MLS: 0.9 INJECTION, SOLUTION INTRAVENOUS at 06:12

## 2023-10-12 RX ADMIN — HUMAN INSULIN SCH UNIT: 100 INJECTION, SOLUTION SUBCUTANEOUS at 21:57

## 2023-10-12 RX ADMIN — HUMAN INSULIN SCH UNIT: 100 INJECTION, SOLUTION SUBCUTANEOUS at 11:58

## 2023-10-12 RX ADMIN — SODIUM CHLORIDE SCH MLS: 9 INJECTION, SOLUTION INTRAVENOUS at 14:00

## 2023-10-12 RX ADMIN — MORPHINE SULFATE PRN MG: 4 INJECTION, SOLUTION INTRAMUSCULAR; INTRAVENOUS at 10:15

## 2023-10-12 RX ADMIN — HYDROCODONE BITARTRATE AND ACETAMINOPHEN PRN TAB: 10; 325 TABLET ORAL at 04:16

## 2023-10-12 RX ADMIN — SODIUM CHLORIDE SCH: 0.9 INJECTION, SOLUTION INTRAVENOUS at 11:00

## 2023-10-12 RX ADMIN — SODIUM CHLORIDE SCH MLS: 9 INJECTION, SOLUTION INTRAVENOUS at 06:13

## 2023-10-12 RX ADMIN — Medication SCH: at 08:14

## 2023-10-12 NOTE — P.PN
Date of Service: 10/12/23





Subjective:


Feeling better today 


pain is tolerable with current pain regimen 


no new / worsening problems


ambulating around bedisde 


afebrile 





ROS: 


10 point ROS as noted above, otherwise negative





Physical Exam:


GEN: Alert, oriented, NAD


HEENT: Normal conjunctiva, sclera anicteric


CV: Regular rate and rhythm, no edema


Pulm: Nonlabored respirations on room air, clear bilaterally 


ABD: Soft, nontender, nondistended


Integumentary: surgical dressing in place, 2 Penrose drains in place, light 

drainage noted on dressing


Neuro: Normal speech, normal affect





vitals reviewed





Problem List:


Severe Sepsis likely secondary to Perineal abscess 


NIDDM2 with hyperglycemia 


Hypertension


Hyperlipidemia 











Severe Sepsis likely secondary to Perineal abscess, now s/p I&D and sharp 

debridement of 2 Perineal abscesses (10/11) 


CT pelvis (10/11): Large irregular abscess collection seen in the right aspect 

of the perineum. There is associated left-sided disc scrotal and perineal soft 

tissue thickening as well


   appears to communicate anterolaterally with the skin surface but does not 

extend into the intrapelvic contents


General surgery consulted


   s/p I&D and sharp debridement of 2 Perineal abscesses (10/11) 


   2 Penrose drains in place 


   wound care per surgery 


   PRN pain medication 


blood cx (10/11): pending 


wound cx (10/11): pending; 1+GPC on stain


urine cx(10/12): pending -- UA with many yeast, no bacteria, +LE, +WBC


   continue empiric zosyn(10/11-)


   afebrile, leukocytosis improving 


ID consulted 








NIDDM2 with hyperglycemia 


Accucheck with SSI





Hypertension


Hyperlipidemia 


confirm home medications, restart as appropriate 








VTE: SCD


Code: Full


Dispo: Home ~1 day


Pending further improvement, afebrile > 24 hours, culture results

## 2023-10-12 NOTE — EKG
Test Date:    2023-10-11               Test Time:    12:19:27

Technician:   N[place]                                     

                                                     

MEASUREMENT RESULTS:                                       

Intervals:                                           

Rate:         113                                    

FL:           136                                    

QRSD:         96                                     

QT:           342                                    

QTc:          469                                    

Axis:                                                

P:            62                                     

FL:           136                                    

QRS:          44                                     

T:            40                                     

                                                     

INTERPRETIVE STATEMENTS:                                       

                                                     

Sinus tachycardia

Otherwise normal ECG

Compared to ECG 10/20/2010 14:36:47

Sinus rhythm no longer present

Ventricular premature complex(es) no longer present



Electronically Signed On 10-12-23 12:28:01 CDT by Meir Gordon

## 2023-10-12 NOTE — P.PN
Date of Service: 10/12/23





Patient is no specific complaints today, feels better.  Dressings have been 

changed 2 times.  No excessive bleeding.





Patient is stable from a surgical point of view.  I have called in a 

prescription to his mail order pharmacy for hydrocodone, 10/325, 1 p.o. 3 times 

daily as needed.  He has been instructed in dressing changes.  His wife will 

call for an appointment and I will see him next Tuesday.  Should he have any 

questions or problems he knows to come back to the emergency room.





I shall check with his hospitalist, and see if he concurs.

## 2023-10-12 NOTE — P.CNS
Date of Consult: 10/12/23


Reason for Consult: perineal abscess


Chief Complaint: Severe sepsis, perineal abscess


History of Present Illness: 





Patient is a 62 yo male with a past medical history of diabetes mellitus type 

II, hypertension and hyperlipidemia who presented to the ED with complaints of 

perineal pain that has been ongoing for about 10 days. CT pelvis revealing 

"Large irregular abscess collection seen in the right aspect of the perineum. 

There is associated left-sided disc scrotal and perineal soft tissue thickening 

as well. This appears to communicate anterolaterally with the skin surface but 

does not extend into the intrapelvic contents." Patient underwent  incision, 

drainage and sharp debridement of 2 perineal abscesses on 10/11 by Dr. Yin. 

Infectious disease was consulted. 


Allergies





No Known Allergies Allergy (Unverified 10/11/23 14:40)


   





Home medications list reviewed: Yes


Home Medications: 








Atorvastatin Calcium [Lipitor*] 20 mg PO DAILY 10/11/23 


Gabapentin 300 mg PO TID 10/11/23 


Insulin Aspart [Novolog Flexpen] 12 units SQ TID 10/11/23 


Insulin Glargine,Hum.rec.anlog [Toujeo Solostar] 50 units SQ BEDTIME 10/11/23 


Lisinopril [Zestril] 10 mg PO DAILY 10/11/23 








- Past Medical/Surgical History


Diabetic: Yes


-: Hypertension


-: Diabetes


-: Hypercholesterolemia


-: hernia repair x2


-: Lt knee replacement





- Social History


Alcohol use: No


CD- Drugs: No


Caffeine use: No


Place of Residence: Home





Physical Examination














Temp Pulse Resp BP Pulse Ox


 


 97.5 F   88   16   128/70   92 


 


 10/12/23 08:00  10/12/23 08:00  10/12/23 10:45  10/12/23 08:00  10/12/23 10:45








General: Alert, In no apparent distress, Oriented x3, Obese


HEENT: Atraumatic, Normocephalic


Neck: Supple, JVD not distended


Respiratory: Clear to auscultation bilaterally, Normal air movement


Cardiovascular: No edema, Normal pulses


Gastrointestinal: Normal bowel sounds, No tenderness, Distended


Integumentary: Other (s/p I&D of perineal abscess)


Neurological: Normal speech, Normal tone, Normal affect





Laboratory Data


- Reviewed





Microbiology Data


- Reviewed





Imagings Data: 


- Reviewed








Conclusions/Impression: 





Problem List


Severe Sepsis secondary to Perineal Abscess


Diabetes Mellitus type II


Hypertension


Hyperlipidemia








Severe Sepsis secondary to Perineal Abscess


- CT pelvis 10/11: "Large irregular abscess collection seen in the right aspect 

of the perineum. There is associated left-sided disc scrotal and perineal soft 

tissue thickening as well. This appears to communicate anterolaterally with the 

skin surface but does not extend into the intrapelvic contents." 


- s/p  incision, drainage and sharp debridement of 2 perineal abscesses on 10/11

 by Dr. Yin


- Wound culture 10/11: gram stain with 1+gram positive cocci 


- blood culture 10/11: pending


- Leukocytosis slightly (WBC 14.2). Afebrile.


- Currently on Zosyn (Started 10/11)








Recommendations


- Continue Zosyn for now. 


Will follow up with final wound culture results and adjust antibiotics as 

appropriate.


Depending on results, consider switch to Augmentin PO x 10 days (10/11-10/21)


- Continue wound care per Dr. Yin


- Monitor WBC and fever trends








Case discussed with ALL Verde

## 2023-10-13 VITALS — SYSTOLIC BLOOD PRESSURE: 150 MMHG | DIASTOLIC BLOOD PRESSURE: 89 MMHG | TEMPERATURE: 97 F

## 2023-10-13 VITALS — OXYGEN SATURATION: 98 %

## 2023-10-13 LAB
BUN BLD-MCNC: 15 MG/DL (ref 7–18)
GLUCOSE SERPLBLD-MCNC: 268 MG/DL (ref 74–106)
HCT VFR BLD CALC: 37.6 % (ref 39.6–49)
LYMPHOCYTES # SPEC AUTO: 1.9 K/UL (ref 0.7–4.9)
MAGNESIUM SERPL-MCNC: 1.9 MG/DL (ref 1.6–2.4)
MCV RBC: 92.7 FL (ref 80–100)
PMV BLD: 8.3 FL (ref 7.6–11.3)
POTASSIUM SERPL-SCNC: 4 MEQ/L (ref 3.5–5.1)
RBC # BLD: 4.06 M/UL (ref 4.33–5.43)
WBC # BLD AUTO: 11.1 THOU/UL (ref 4.3–10.9)

## 2023-10-13 RX ADMIN — HYDROCODONE BITARTRATE AND ACETAMINOPHEN PRN TAB: 10; 325 TABLET ORAL at 02:14

## 2023-10-13 RX ADMIN — SODIUM CHLORIDE SCH MLS: 9 INJECTION, SOLUTION INTRAVENOUS at 05:18

## 2023-10-13 NOTE — P.PN
Date of Service: 10/13/23


Chief Complaint: Severe sepsis, perineal abscess


Subjective: 


Improving. 


No acute events reported overnight


+ rectal / perineal pain. 


Patient states he is ready to go home. 








Physical Examination











Temp Pulse Resp BP Pulse Ox


 


 96.6 F L  87   20   133/64   93 


 


 10/13/23 04:00  10/13/23 04:00  10/13/23 04:00  10/13/23 04:00  10/13/23 04:00








General: Alert, In no apparent distress, Oriented x3, Obese


HEENT: Atraumatic, Normocephalic


Neck: Supple, JVD not distended


Respiratory: Clear to auscultation bilaterally, Normal air movement


Cardiovascular: No edema, Normal pulses


Gastrointestinal: Normal bowel sounds, No tenderness. Round, Distended


Integumentary: s/p I&D of perineal abscess


Neurological: Normal speech, Normal tone, Normal affect








Laboratory Data


- Reviewed





Microbiology Data


- Reviewed





Imagings Data: 


- Reviewed





Medications List: Reviewed








Assessment and Plan





Problem List


Severe Sepsis secondary to Perineal Abscess


Diabetes Mellitus type II


Hypertension


Hyperlipidemia








Severe Sepsis secondary to Perineal Abscess


- CT pelvis 10/11: "Large irregular abscess collection seen in the right aspect 

of the perineum. There is associated left-sided disc scrotal and perineal soft 

tissue thickening as well. This appears to communicate anterolaterally with the 

skin surface but does not extend into the intrapelvic contents." 


- s/p  incision, drainage and sharp debridement of 2 perineal abscesses on 10/11

by Dr. Yin


- Wound culture 10/11: gram stain with 1+gram positive cocci 


- blood culture 10/11: pending


- Leukocytosis improving (WBC 11.1) Afebrile.


- Currently on Zosyn (Started 10/11)








Recommendations


- Consider switch to Augmentin PO upon discharge. Continue antibiotic therapy 

for 10-14 days 


- Continue wound care per Dr. Yin


- Patient has follow up appointment schedule with Dr. Yin on Tuesday 10/17


- Monitor for worsening s/s of infection, increased pain, abnormal drainage, 

redness





Case discussed with DREW Verde.

## 2023-10-13 NOTE — P.DS
Admission Date: 10/11/23


Discharge Date: 10/13/23


Disposition: ROUTINE DISCHARGE


Discharge Condition: GOOD


Reason for Admission: Severe sepsis, perineal abscess


Consultations: 





General surgery - Dr. Yin 


Infectious Disease - Dr. Osborne 





Brief History of Present Illness: 





Ceasar Larios 63-year-old male with a history of hypertension, diabetes 

mellitus, hypercholesterolemia presented to the ER via ambulance with 

complaining of groin abscess.  Patient reported the symptoms started 10 days 

ago.  Patient reports pain on the rectal area got worse today.  Patient also 

reports pain when urinating.  Patient reports the pain is 10 out of 10.  Denies 

history of previous colorectal surgeries, or hemorrhoids.  Patient denies fever 

chills nausea vomiting.  Patient denies chest pain palpitation chest discomfort.

 Patient reports he has been not checking his blood sugar for few weeks as his 

glucometer is not working.  He seen his diabetic doctor once every 6 months last

A1c was around 6.


ED course; vital signs blood pressure 166/100, pulse 116, respiration 20, 

temperature 98.2, pulse ox 95% on room air.  Lab works is reports significant 

for WBC 15.80,neutrophil 85.3 lymphocytes 7.3 blood sugar 431, lactic acid 2.2, 

albumin 2.9.  Patient is in regular sinus tachycardia QRS Axis is normal, [place] 

interval is normal, QRS interval is normal, with nonspecific STT changes.  

Patient was given well 1 L normal saline IV bolus, Zosyn IV piggyback to 3.375 g

IV over 60 minutes, morphine 4 mg IV push over 4 minutes for pain given.  CT 

pelvis without contrast significant for large irregular abscess collection seen 

in the right aspect of the perineum.  There is associated left-sided disc 

scrotal and perineal soft tissue thickening as well.  This appears to 

communicate nterolateraly and general laterally with skin surface but does not 

extend into intrapelvic contents.  Planning to admit the patient with the 

diagnosis of severe sepsis without septic shock, and perineal abscess


Hospital Course: 





Problem List:


Severe Sepsis likely secondary to Perineal abscess, now s/p I&D and sharp 

debridement of 2 Perineal abscesses (10/11) 


NIDDM2 with hyperglycemia 


Hypertension


Hyperlipidemia 








Patient presented with rectal and groin pain. He was found to have a large 

irregular perineal abscess seen on CT abdomen/pelvis on admission.


General surgery was consulted. Patient underwent successful I&D with sharp 

debridement of 2 perineal abscesses on 10/11 with Dr. Yin. 2 separate 

Penrose drains were placed during surgery. 


Patient was given empiric zosyn to treat for possible infection. Blood cultures 

without growth since 10/11; 


Patient was feeling better, wanting to go home, and deemed stable for discharge.

Stable for discharge from Dr. Yin. Patient remained afebrile throughout 

hospitalization and leukocytosis improved.





Wound cultures prelim report growing gram negative rods on discharge. He is to 

complete 8 more days of Augmentin for a total antibiotic treatment of ~10 days.


Preliminary seen on wound culture stain: 1+gram positive cocci in pairs and 

chains. 





Medication:


Augmentin x8 days


Norco 10 send by Dr. Yin 








Follow up:


PCP 3-5 days


Dr. Yin as previously planned next week (tuesday)


Local wound care per Dr. Yin 











Physical Exam:


GEN: Alert, oriented, NAD


HEENT: Normal conjunctiva, sclera anicteric


CV: Regular rate and rhythm, no edema


Pulm: Nonlabored respirations on room air, clear bilaterally 


ABD: Soft, nontender, nondistended


Integumentary: surgical dressing in place, 2 Penrose drains in place, light 

drainage noted on dressing


Neuro: Normal speech, normal affect





Vital Signs/Physical Exam: 














Temp Pulse Resp BP Pulse Ox


 


 96.6 F L  87   20   133/64   93 


 


 10/13/23 04:00  10/13/23 04:00  10/13/23 04:00  10/13/23 04:00  10/13/23 04:00








Laboratory Data at Discharge: 














WBC  11.10 thou/uL (4.3-10.9)  H  10/13/23  01:53    


 


Hgb  12.9 g/dL (13.6-17.9)  L  10/13/23  01:53    


 


Hct  37.6 % (39.6-49.0)  L  10/13/23  01:53    


 


Plt Count  279 thou/uL (152-406)   10/13/23  01:53    


 


PT  12.0 SECONDS (9.5-12.5)   10/11/23  12:06    


 


INR  1.09   10/11/23  12:06    


 


APTT  30.4 SECONDS (24.3-36.9)   10/11/23  12:06    


 


Sodium  141 mEq/L (136-145)   10/13/23  01:53    


 


Potassium  4.0 mEq/L (3.5-5.1)   10/13/23  01:53    


 


BUN  15 mg/dL (7-18)   10/13/23  01:53    


 


Creatinine  1.03 mg/dL (0.70-1.30)   10/13/23  01:53    


 


Glucose  268 mg/dL ()  H  10/13/23  01:53    


 


Phosphorus  2.6 mg/dL (2.5-4.9)   10/13/23  01:53    


 


Magnesium  1.9 mg/dL (1.6-2.4)   10/13/23  01:53    


 


Total Bilirubin  0.4 mg/dL (0.2-1.0)   10/11/23  12:06    


 


AST  5 U/L (15-37)  L  10/11/23  12:06    


 


ALT  16 U/L (16-61)   10/11/23  12:06    


 


Alkaline Phosphatase  88 U/L ()   10/11/23  12:06    








Home Medications: 








Atorvastatin Calcium [Lipitor*] 20 mg PO DAILY 10/11/23 


Gabapentin 300 mg PO TID 10/11/23 


Insulin Aspart [Novolog Flexpen] 12 units SQ TID 10/11/23 


Insulin Glargine,Hum.rec.anlog [Toujeo Solostar] 50 units SQ BEDTIME 10/11/23 


Lisinopril [Zestril] 10 mg PO DAILY 10/11/23 


Amox/Clavulanate [Augmentin 875-125 Tab] 875 mg PO BID 8 Days #16 tab 10/13/23 





New Medications: 


Amox/Clavulanate [Augmentin 875-125 Tab] 875 mg PO BID 8 Days #16 tab


Physician Discharge Instructions: 


Patient presented with rectal and groin pain. He was found to have a large 

irregular perineal abscess seen on CT abdomen/pelvis on admission.


General surgery was consulted. Patient underwent successful I&D with sharp 

debridement of 2 perineal abscesses on 10/11 with Dr. Yin. 2 separate 

Penrose drains were placed during surgery. 


Patient was given empiric zosyn to treat for possible infection. Blood cultures 

without growth since 10/11; 


Patient was feeling better, wanting to go home, and deemed stable for discharge.

Stable for discharge from Dr. Yin. Patient remained afebrile throughout 

hospitalization and leukocytosis improved.





Wound cultures prelim report growing gram negative rods on discharge. He is to 

complete 8 more days of Augmentin for a total antibiotic treatment of ~10 days.


Preliminary seen on wound culture stain: 1+gram positive cocci in pairs and 

chains. 





Medication:


Augmentin x8 days


Norco 10 send by Dr. Yin 








Follow up:


PCP 3-5 days


Dr. Yin as previously planned next week (tuesday)


Local wound care per Dr. Yin 





Followup: 


Aaron Yin MD [ACTIVE - CAN ADMIT] - 


NONE,NONE [Primary Care Provider] - 


Time spent managing pt's care (in minutes): 45